# Patient Record
Sex: FEMALE | Race: WHITE | Employment: UNEMPLOYED | URBAN - METROPOLITAN AREA
[De-identification: names, ages, dates, MRNs, and addresses within clinical notes are randomized per-mention and may not be internally consistent; named-entity substitution may affect disease eponyms.]

---

## 2022-03-28 ENCOUNTER — HOSPITAL ENCOUNTER (INPATIENT)
Age: 57
LOS: 7 days | Discharge: NURSING, REHAB OR LONG-TERM CARE | DRG: 383 | End: 2022-04-04
Attending: EMERGENCY MEDICINE | Admitting: INTERNAL MEDICINE
Payer: MEDICARE

## 2022-03-28 ENCOUNTER — APPOINTMENT (OUTPATIENT)
Dept: CT IMAGING | Age: 57
DRG: 383 | End: 2022-03-28
Payer: MEDICARE

## 2022-03-28 ENCOUNTER — APPOINTMENT (OUTPATIENT)
Dept: GENERAL RADIOLOGY | Age: 57
DRG: 383 | End: 2022-03-28
Payer: MEDICARE

## 2022-03-28 DIAGNOSIS — N39.0 URINARY TRACT INFECTION WITHOUT HEMATURIA, SITE UNSPECIFIED: ICD-10-CM

## 2022-03-28 DIAGNOSIS — R19.7 NAUSEA VOMITING AND DIARRHEA: ICD-10-CM

## 2022-03-28 DIAGNOSIS — R10.9 ABDOMINAL PAIN, UNSPECIFIED ABDOMINAL LOCATION: Primary | ICD-10-CM

## 2022-03-28 DIAGNOSIS — R10.9 LEFT FLANK PAIN: ICD-10-CM

## 2022-03-28 DIAGNOSIS — R11.2 NAUSEA VOMITING AND DIARRHEA: ICD-10-CM

## 2022-03-28 LAB
A/G RATIO: 0.7 (ref 1.1–2.2)
ALBUMIN SERPL-MCNC: 3.2 G/DL (ref 3.4–5)
ALP BLD-CCNC: 170 U/L (ref 40–129)
ALT SERPL-CCNC: 16 U/L (ref 10–40)
ANION GAP SERPL CALCULATED.3IONS-SCNC: 10 MMOL/L (ref 3–16)
AST SERPL-CCNC: 35 U/L (ref 15–37)
BACTERIA: ABNORMAL /HPF
BASE EXCESS ARTERIAL: -2.1 MMOL/L (ref -3–3)
BASOPHILS ABSOLUTE: 0 K/UL (ref 0–0.2)
BASOPHILS RELATIVE PERCENT: 0.2 %
BILIRUB SERPL-MCNC: 2.3 MG/DL (ref 0–1)
BILIRUBIN URINE: NEGATIVE
BLOOD, URINE: ABNORMAL
BUN BLDV-MCNC: 9 MG/DL (ref 7–20)
CALCIUM SERPL-MCNC: 9.5 MG/DL (ref 8.3–10.6)
CARBOXYHEMOGLOBIN ARTERIAL: 1.8 % (ref 0–1.5)
CHLORIDE BLD-SCNC: 104 MMOL/L (ref 99–110)
CLARITY: ABNORMAL
CO2: 21 MMOL/L (ref 21–32)
COLOR: ABNORMAL
CREAT SERPL-MCNC: <0.5 MG/DL (ref 0.6–1.1)
EKG ATRIAL RATE: 71 BPM
EKG DIAGNOSIS: NORMAL
EKG P AXIS: 32 DEGREES
EKG P-R INTERVAL: 146 MS
EKG Q-T INTERVAL: 452 MS
EKG QRS DURATION: 88 MS
EKG QTC CALCULATION (BAZETT): 491 MS
EKG R AXIS: -26 DEGREES
EKG T AXIS: 0 DEGREES
EKG VENTRICULAR RATE: 71 BPM
EOSINOPHILS ABSOLUTE: 0.1 K/UL (ref 0–0.6)
EOSINOPHILS RELATIVE PERCENT: 1.3 %
EPITHELIAL CELLS, UA: ABNORMAL /HPF (ref 0–5)
GFR AFRICAN AMERICAN: >60
GFR NON-AFRICAN AMERICAN: >60
GLUCOSE BLD-MCNC: 116 MG/DL (ref 70–99)
GLUCOSE URINE: NEGATIVE MG/DL
HCO3 ARTERIAL: 21 MMOL/L (ref 21–29)
HCT VFR BLD CALC: 37.5 % (ref 36–48)
HEMOGLOBIN, ART, EXTENDED: 11.8 G/DL
HEMOGLOBIN: 12.5 G/DL (ref 12–16)
KETONES, URINE: NEGATIVE MG/DL
LEUKOCYTE ESTERASE, URINE: NEGATIVE
LIPASE: 26 U/L (ref 13–60)
LYMPHOCYTES ABSOLUTE: 0.8 K/UL (ref 1–5.1)
LYMPHOCYTES RELATIVE PERCENT: 14.1 %
MAGNESIUM: 1.7 MG/DL (ref 1.8–2.4)
MCH RBC QN AUTO: 29.6 PG (ref 26–34)
MCHC RBC AUTO-ENTMCNC: 33.3 G/DL (ref 31–36)
MCV RBC AUTO: 89 FL (ref 80–100)
METHEMOGLOBIN ARTERIAL: 0.1 % (ref 0–1.4)
MICROSCOPIC EXAMINATION: YES
MONOCYTES ABSOLUTE: 0.7 K/UL (ref 0–1.3)
MONOCYTES RELATIVE PERCENT: 11.6 %
NEUTROPHILS ABSOLUTE: 4.3 K/UL (ref 1.7–7.7)
NEUTROPHILS RELATIVE PERCENT: 72.8 %
NITRITE, URINE: POSITIVE
O2 SAT, ARTERIAL: 95 % (ref 93–100)
PCO2 ARTERIAL: 28.3 MMHG (ref 35–45)
PDW BLD-RTO: 15.2 % (ref 12.4–15.4)
PH ARTERIAL: 7.47 (ref 7.35–7.45)
PH UA: 8.5 (ref 5–8)
PLATELET # BLD: 108 K/UL (ref 135–450)
PMV BLD AUTO: 8.3 FL (ref 5–10.5)
PO2 ARTERIAL: 70.7 MMHG (ref 75–108)
POTASSIUM SERPL-SCNC: 4.4 MMOL/L (ref 3.5–5.1)
PROTEIN UA: ABNORMAL MG/DL
RBC # BLD: 4.22 M/UL (ref 4–5.2)
RBC UA: ABNORMAL /HPF (ref 0–4)
SODIUM BLD-SCNC: 135 MMOL/L (ref 136–145)
SPECIFIC GRAVITY UA: 1.01 (ref 1–1.03)
TCO2 ARTERIAL: 22 MMOL/L
TOTAL CK: 35 U/L (ref 26–192)
TOTAL PROTEIN: 7.5 G/DL (ref 6.4–8.2)
TROPONIN: <0.01 NG/ML
URINE REFLEX TO CULTURE: ABNORMAL
URINE TYPE: ABNORMAL
UROBILINOGEN, URINE: 2 E.U./DL
WBC # BLD: 5.9 K/UL (ref 4–11)
WBC UA: ABNORMAL /HPF (ref 0–5)

## 2022-03-28 PROCEDURE — 83735 ASSAY OF MAGNESIUM: CPT

## 2022-03-28 PROCEDURE — 85025 COMPLETE CBC W/AUTO DIFF WBC: CPT

## 2022-03-28 PROCEDURE — 6360000002 HC RX W HCPCS: Performed by: EMERGENCY MEDICINE

## 2022-03-28 PROCEDURE — 80053 COMPREHEN METABOLIC PANEL: CPT

## 2022-03-28 PROCEDURE — 96361 HYDRATE IV INFUSION ADD-ON: CPT

## 2022-03-28 PROCEDURE — 93010 ELECTROCARDIOGRAM REPORT: CPT | Performed by: INTERNAL MEDICINE

## 2022-03-28 PROCEDURE — 6360000002 HC RX W HCPCS: Performed by: INTERNAL MEDICINE

## 2022-03-28 PROCEDURE — 36415 COLL VENOUS BLD VENIPUNCTURE: CPT

## 2022-03-28 PROCEDURE — 87186 SC STD MICRODIL/AGAR DIL: CPT

## 2022-03-28 PROCEDURE — 87040 BLOOD CULTURE FOR BACTERIA: CPT

## 2022-03-28 PROCEDURE — 87077 CULTURE AEROBIC IDENTIFY: CPT

## 2022-03-28 PROCEDURE — 36600 WITHDRAWAL OF ARTERIAL BLOOD: CPT

## 2022-03-28 PROCEDURE — 2500000003 HC RX 250 WO HCPCS: Performed by: EMERGENCY MEDICINE

## 2022-03-28 PROCEDURE — 2580000003 HC RX 258: Performed by: EMERGENCY MEDICINE

## 2022-03-28 PROCEDURE — 96376 TX/PRO/DX INJ SAME DRUG ADON: CPT

## 2022-03-28 PROCEDURE — 93005 ELECTROCARDIOGRAM TRACING: CPT | Performed by: EMERGENCY MEDICINE

## 2022-03-28 PROCEDURE — 96375 TX/PRO/DX INJ NEW DRUG ADDON: CPT

## 2022-03-28 PROCEDURE — 87086 URINE CULTURE/COLONY COUNT: CPT

## 2022-03-28 PROCEDURE — C9113 INJ PANTOPRAZOLE SODIUM, VIA: HCPCS | Performed by: EMERGENCY MEDICINE

## 2022-03-28 PROCEDURE — 84484 ASSAY OF TROPONIN QUANT: CPT

## 2022-03-28 PROCEDURE — 99283 EMERGENCY DEPT VISIT LOW MDM: CPT

## 2022-03-28 PROCEDURE — A4216 STERILE WATER/SALINE, 10 ML: HCPCS | Performed by: EMERGENCY MEDICINE

## 2022-03-28 PROCEDURE — 74177 CT ABD & PELVIS W/CONTRAST: CPT

## 2022-03-28 PROCEDURE — 82803 BLOOD GASES ANY COMBINATION: CPT

## 2022-03-28 PROCEDURE — 2580000003 HC RX 258: Performed by: INTERNAL MEDICINE

## 2022-03-28 PROCEDURE — 1200000000 HC SEMI PRIVATE

## 2022-03-28 PROCEDURE — 81001 URINALYSIS AUTO W/SCOPE: CPT

## 2022-03-28 PROCEDURE — 6360000004 HC RX CONTRAST MEDICATION: Performed by: EMERGENCY MEDICINE

## 2022-03-28 PROCEDURE — 71045 X-RAY EXAM CHEST 1 VIEW: CPT

## 2022-03-28 PROCEDURE — 82550 ASSAY OF CK (CPK): CPT

## 2022-03-28 PROCEDURE — 96365 THER/PROPH/DIAG IV INF INIT: CPT

## 2022-03-28 PROCEDURE — 6370000000 HC RX 637 (ALT 250 FOR IP): Performed by: INTERNAL MEDICINE

## 2022-03-28 PROCEDURE — 83690 ASSAY OF LIPASE: CPT

## 2022-03-28 RX ORDER — SODIUM CHLORIDE 9 MG/ML
INJECTION, SOLUTION INTRAVENOUS PRN
Status: DISCONTINUED | OUTPATIENT
Start: 2022-03-28 | End: 2022-04-04 | Stop reason: HOSPADM

## 2022-03-28 RX ORDER — SODIUM CHLORIDE 0.9 % (FLUSH) 0.9 %
10 SYRINGE (ML) INJECTION PRN
Status: DISCONTINUED | OUTPATIENT
Start: 2022-03-28 | End: 2022-04-04 | Stop reason: HOSPADM

## 2022-03-28 RX ORDER — PROCHLORPERAZINE EDISYLATE 5 MG/ML
5 INJECTION INTRAMUSCULAR; INTRAVENOUS ONCE
Status: COMPLETED | OUTPATIENT
Start: 2022-03-28 | End: 2022-03-28

## 2022-03-28 RX ORDER — 0.9 % SODIUM CHLORIDE 0.9 %
1000 INTRAVENOUS SOLUTION INTRAVENOUS ONCE
Status: COMPLETED | OUTPATIENT
Start: 2022-03-28 | End: 2022-03-28

## 2022-03-28 RX ORDER — MORPHINE SULFATE 2 MG/ML
2 INJECTION, SOLUTION INTRAMUSCULAR; INTRAVENOUS
Status: DISCONTINUED | OUTPATIENT
Start: 2022-03-28 | End: 2022-03-29

## 2022-03-28 RX ORDER — PANTOPRAZOLE SODIUM 40 MG/10ML
40 INJECTION, POWDER, LYOPHILIZED, FOR SOLUTION INTRAVENOUS ONCE
Status: COMPLETED | OUTPATIENT
Start: 2022-03-28 | End: 2022-03-28

## 2022-03-28 RX ORDER — SODIUM CHLORIDE, SODIUM LACTATE, POTASSIUM CHLORIDE, AND CALCIUM CHLORIDE .6; .31; .03; .02 G/100ML; G/100ML; G/100ML; G/100ML
1000 INJECTION, SOLUTION INTRAVENOUS ONCE
Status: COMPLETED | OUTPATIENT
Start: 2022-03-28 | End: 2022-03-28

## 2022-03-28 RX ORDER — SODIUM CHLORIDE 0.9 % (FLUSH) 0.9 %
5-40 SYRINGE (ML) INJECTION EVERY 12 HOURS SCHEDULED
Status: DISCONTINUED | OUTPATIENT
Start: 2022-03-28 | End: 2022-04-04 | Stop reason: HOSPADM

## 2022-03-28 RX ORDER — SODIUM CHLORIDE 0.9 % (FLUSH) 0.9 %
5-40 SYRINGE (ML) INJECTION PRN
Status: DISCONTINUED | OUTPATIENT
Start: 2022-03-28 | End: 2022-04-04 | Stop reason: HOSPADM

## 2022-03-28 RX ORDER — POLYETHYLENE GLYCOL 3350 17 G/17G
17 POWDER, FOR SOLUTION ORAL DAILY PRN
Status: DISCONTINUED | OUTPATIENT
Start: 2022-03-28 | End: 2022-04-04 | Stop reason: HOSPADM

## 2022-03-28 RX ORDER — IPRATROPIUM BROMIDE AND ALBUTEROL SULFATE 2.5; .5 MG/3ML; MG/3ML
1 SOLUTION RESPIRATORY (INHALATION)
Status: DISCONTINUED | OUTPATIENT
Start: 2022-03-29 | End: 2022-03-29

## 2022-03-28 RX ORDER — PROMETHAZINE HYDROCHLORIDE 12.5 MG/1
12.5 TABLET ORAL EVERY 6 HOURS PRN
Status: DISCONTINUED | OUTPATIENT
Start: 2022-03-28 | End: 2022-04-04 | Stop reason: HOSPADM

## 2022-03-28 RX ORDER — SODIUM CHLORIDE 0.9 % (FLUSH) 0.9 %
10 SYRINGE (ML) INJECTION EVERY 12 HOURS SCHEDULED
Status: DISCONTINUED | OUTPATIENT
Start: 2022-03-28 | End: 2022-04-04 | Stop reason: HOSPADM

## 2022-03-28 RX ORDER — MAGNESIUM SULFATE IN WATER 40 MG/ML
2000 INJECTION, SOLUTION INTRAVENOUS ONCE
Status: COMPLETED | OUTPATIENT
Start: 2022-03-28 | End: 2022-03-29

## 2022-03-28 RX ORDER — ACETAMINOPHEN 650 MG/1
650 SUPPOSITORY RECTAL EVERY 6 HOURS PRN
Status: DISCONTINUED | OUTPATIENT
Start: 2022-03-28 | End: 2022-04-04 | Stop reason: HOSPADM

## 2022-03-28 RX ORDER — SENNA AND DOCUSATE SODIUM 50; 8.6 MG/1; MG/1
1 TABLET, FILM COATED ORAL 2 TIMES DAILY
Status: DISCONTINUED | OUTPATIENT
Start: 2022-03-28 | End: 2022-04-04 | Stop reason: HOSPADM

## 2022-03-28 RX ORDER — ONDANSETRON 2 MG/ML
4 INJECTION INTRAMUSCULAR; INTRAVENOUS ONCE
Status: DISCONTINUED | OUTPATIENT
Start: 2022-03-28 | End: 2022-03-28

## 2022-03-28 RX ORDER — MORPHINE SULFATE 4 MG/ML
4 INJECTION, SOLUTION INTRAMUSCULAR; INTRAVENOUS
Status: DISCONTINUED | OUTPATIENT
Start: 2022-03-28 | End: 2022-03-29

## 2022-03-28 RX ORDER — MORPHINE SULFATE 4 MG/ML
4 INJECTION, SOLUTION INTRAMUSCULAR; INTRAVENOUS ONCE
Status: COMPLETED | OUTPATIENT
Start: 2022-03-28 | End: 2022-03-28

## 2022-03-28 RX ORDER — ONDANSETRON 2 MG/ML
4 INJECTION INTRAMUSCULAR; INTRAVENOUS EVERY 6 HOURS PRN
Status: DISCONTINUED | OUTPATIENT
Start: 2022-03-28 | End: 2022-04-04 | Stop reason: HOSPADM

## 2022-03-28 RX ORDER — ACETAMINOPHEN 325 MG/1
650 TABLET ORAL EVERY 6 HOURS PRN
Status: DISCONTINUED | OUTPATIENT
Start: 2022-03-28 | End: 2022-04-04 | Stop reason: HOSPADM

## 2022-03-28 RX ADMIN — CEFTRIAXONE 1000 MG: 1 INJECTION, POWDER, FOR SOLUTION INTRAMUSCULAR; INTRAVENOUS at 15:56

## 2022-03-28 RX ADMIN — SODIUM CHLORIDE, POTASSIUM CHLORIDE, SODIUM LACTATE AND CALCIUM CHLORIDE 1000 ML: 600; 310; 30; 20 INJECTION, SOLUTION INTRAVENOUS at 21:20

## 2022-03-28 RX ADMIN — MORPHINE SULFATE 4 MG: 4 INJECTION, SOLUTION INTRAMUSCULAR; INTRAVENOUS at 10:16

## 2022-03-28 RX ADMIN — PROMETHAZINE HYDROCHLORIDE 12.5 MG: 12.5 TABLET ORAL at 22:27

## 2022-03-28 RX ADMIN — ACETAMINOPHEN 650 MG: 325 TABLET ORAL at 20:16

## 2022-03-28 RX ADMIN — IOPAMIDOL 80 ML: 755 INJECTION, SOLUTION INTRAVENOUS at 11:44

## 2022-03-28 RX ADMIN — SODIUM CHLORIDE, PRESERVATIVE FREE 10 ML: 5 INJECTION INTRAVENOUS at 20:17

## 2022-03-28 RX ADMIN — MORPHINE SULFATE 4 MG: 4 INJECTION, SOLUTION INTRAMUSCULAR; INTRAVENOUS at 15:31

## 2022-03-28 RX ADMIN — SODIUM CHLORIDE, PRESERVATIVE FREE 10 ML: 5 INJECTION INTRAVENOUS at 22:28

## 2022-03-28 RX ADMIN — SODIUM CHLORIDE 1000 ML: 9 INJECTION, SOLUTION INTRAVENOUS at 10:16

## 2022-03-28 RX ADMIN — VANCOMYCIN HYDROCHLORIDE 1250 MG: 10 INJECTION, POWDER, LYOPHILIZED, FOR SOLUTION INTRAVENOUS at 23:35

## 2022-03-28 RX ADMIN — MORPHINE SULFATE 4 MG: 4 INJECTION INTRAVENOUS at 21:18

## 2022-03-28 RX ADMIN — CEFEPIME HYDROCHLORIDE 2000 MG: 2 INJECTION, POWDER, FOR SOLUTION INTRAVENOUS at 21:22

## 2022-03-28 RX ADMIN — PANTOPRAZOLE SODIUM 40 MG: 40 INJECTION, POWDER, FOR SOLUTION INTRAVENOUS at 17:14

## 2022-03-28 RX ADMIN — FAMOTIDINE 20 MG: 10 INJECTION, SOLUTION INTRAVENOUS at 10:15

## 2022-03-28 RX ADMIN — MAGNESIUM SULFATE HEPTAHYDRATE 2000 MG: 2 INJECTION, SOLUTION INTRAVENOUS at 22:12

## 2022-03-28 RX ADMIN — ENOXAPARIN SODIUM 40 MG: 100 INJECTION SUBCUTANEOUS at 20:17

## 2022-03-28 RX ADMIN — PROCHLORPERAZINE EDISYLATE 5 MG: 5 INJECTION INTRAMUSCULAR; INTRAVENOUS at 10:15

## 2022-03-28 ASSESSMENT — PAIN SCALES - GENERAL
PAINLEVEL_OUTOF10: 9

## 2022-03-28 ASSESSMENT — PAIN - FUNCTIONAL ASSESSMENT: PAIN_FUNCTIONAL_ASSESSMENT: 0-10

## 2022-03-28 ASSESSMENT — PAIN DESCRIPTION - LOCATION: LOCATION: ABDOMEN

## 2022-03-28 ASSESSMENT — PAIN DESCRIPTION - PAIN TYPE: TYPE: ACUTE PAIN

## 2022-03-28 ASSESSMENT — PAIN DESCRIPTION - ORIENTATION: ORIENTATION: LEFT

## 2022-03-28 NOTE — ED PROVIDER NOTES
[facility]  EMERGENCY DEPT VISIT      Patient Identification  NITA Etienne is a 59 y.o. female. Chief Complaint   Abdominal Pain, Emesis, and Diarrhea      History of Present Illness: This is a  59 y.o. female who presents via ambulance to the ED with complaints of a 1 week history of abdominal pain, left-sided back pain, nausea, vomiting, and diarrhea. No definite fever. No known ill contacts. She tried take Imodium without relief. She has been trying to drink water. She denies dysuria, frequency, urgency, hematuria. She states that her chest is bothering her also but is not able to specify what type of discomfort it is. No shortness of breath. She is chronically trached. Past Medical History:   Diagnosis Date    Hyperlipidemia     Tracheostomy in place [facility])        History reviewed. No pertinent surgical history. No current facility-administered medications for this encounter. No current outpatient medications on file. No Known Allergies    Social History     Socioeconomic History    Marital status: Single     Spouse name: Not on file    Number of children: Not on file    Years of education: Not on file    Highest education level: Not on file   Occupational History    Not on file   Tobacco Use    Smoking status: Former Smoker    Smokeless tobacco: Never Used   Substance and Sexual Activity    Alcohol use: Not Currently    Drug use: Never    Sexual activity: Not on file   Other Topics Concern    Not on file   Social History Narrative    Not on file     Social Determinants of Health     Financial Resource Strain:     Difficulty of Paying Living Expenses: Not on file   Food Insecurity:     Worried About [address] in the Last Year: Not on file    Sunil of Food in the Last Year: Not on file   Transportation Needs:     Lack of Transportation (Medical): Not on file    Lack of Transportation (Non-Medical):  Not on file   Physical Activity:     Days of Exercise per Week: Not on file    Minutes of Exercise per Session: Not on file   Stress:     Feeling of Stress : Not on file   Social Connections:     Frequency of Communication with Friends and Family: Not on file    Frequency of Social Gatherings with Friends and Family: Not on file    Attends Mandaeism Services: Not on file    Active Member of [address] [place] [address] or Organizations: Not on file    Attends Club or Organization Meetings: Not on file    Marital Status: Not on file   Intimate Partner Violence:     Fear of Current or Ex-Partner: Not on file    Emotionally Abused: Not on file    Physically Abused: Not on file    Sexually Abused: Not on file   Housing Stability:     Unable to Pay for Housing in the Last Year: Not on file    Number of [place] in the Last Year: Not on file    Unstable Housing in the Last Year: Not on file       Nursing Notes Reviewed      ROS:  GENERAL:  + fever, no chills, no diaphoresis, no appetite changes  EYES: no eye discharge, no eye redness, no visual changes  ENT: no nasal congestion, no sore throat  CARDIAC: no chest pain,  no leg swelling  PULM: no cough, no shortness of breath  ABD: + abdominal pain, + nausea, + vomiting, + diarrhea, no melena or hematochezia  : no dysuria, no hematuria, no urgency, no frequency. + flank pain  MUSCULOSKELETAL: + back pain, no arthralgias, no myalgias  NEURO: no headache, no lightheadedness, no dizziness, no numbness, no weakness, no syncope  SKIN: no rashes, no erythema, no wounds, no ecchymosis      PHYSICAL EXAM:  GENERAL APPEARANCE: NITA Etienne is in no acute respiratory distress. Awake and alert. VITAL SIGNS:   ED Triage Vitals [03/28/22 0939]   Enc Vitals Group      BP (!) 160/79      Pulse 60      Resp 18      Temp 97.9 °F (36.6 °C)      Temp Source Oral      SpO2 100 %      Weight 200 lb (90.7 kg)      Height       Head Circumference       Peak Flow       Pain Score       Pain Loc       Pain Edu? Excl. in [address]?       HEAD: Normocephalic, atraumatic. EYES:  Extraocular muscles are intact. Pupils equal round and reactive to light. Conjunctivas are pink. Negative scleral icterus. ENT:  Mucous membranes are dry  Pharynx without erythema or exudates. NECK: Nontender and supple. No cervical adenopathy. Trach in place  CHEST:  Clear to auscultation bilaterally. No rales, rhonchi, or wheezing. HEART:  Regular rate and regular rhythm. No murmurs. Strong and equal pulses in the upper and lower extremities. ABDOMEN: Soft,  nondistended, positive bowel sounds. abdomen is diffusely tender. No rebound. no guarding. MUSCULOSKELETAL: The calves are nontender to palpation. Active range of motion of the upper and lower extremities. No edema. NEUROLOGICAL: Awake, alert and oriented x 3. Power intact in the upper and lower extremities. DERMATOLOGIC: No petechiae, rashes, or ecchymoses. No erythema. PSYCH: normal mood and affect. Normal thought content. ED COURSE AND MEDICAL DECISION MAKING:  The Ekg interpreted by me in the absence of a cardiologist shows. normal sinus rhythm with a rate of 71  Axis is   Normal  QTc is  491ms   LVH  Intervals and Durations are unremarkable. Non specific ST-T wave changes appreciated. No evidence of acute ischemia. No prior EKG        Radiology:  Films have been read by radiologist as noted in chart unless otherwise stated. Other radiologic studies (i.e. CT, MRI, ultrasounds, etc ) have been interpreted by radiologist.     CT ABDOMEN PELVIS W IV CONTRAST Additional Contrast? None   Final Result   1. Cirrhotic appearing liver with stigmata of portal hypertension consisting of splenomegaly, small amount of ascites and left upper quadrant varices. 2. Distended gallbladder with cholelithiasis. This can be seen with prolonged fasting state or chronic cystic duct obstruction. 3. There is some mild wall thickening of the transverse limb of the duodenum with some adjacent retroperitoneal fat stranding.  This may reflect a duodenitis. Alternatively, this appearance may be caused by the duodenum being decompressed. This could be    further evaluated with endoscopy. 4. There is some retroperitoneal fat stranding identified within the right upper quadrant and right mid abdomen. This may be related to cirrhosis and portal hypertension. Reactive edema could result in this appearance in the setting of a duodenitis.           Labs:  Results for orders placed or performed during the hospital encounter of 03/28/22   CBC with Auto Differential   Result Value Ref Range    WBC 5.9 4.0 - 11.0 K/uL    RBC 4.22 4.00 - 5.20 M/uL    Hemoglobin 12.5 12.0 - 16.0 g/dL    Hematocrit 37.5 36.0 - 48.0 %    MCV 89.0 80.0 - 100.0 fL    MCH 29.6 26.0 - 34.0 pg    MCHC 33.3 31.0 - 36.0 g/dL    RDW 15.2 12.4 - 15.4 %    Platelets 639 (L) 641 - 450 K/uL    MPV 8.3 5.0 - 10.5 fL    Neutrophils % 72.8 %    Lymphocytes % 14.1 %    Monocytes % 11.6 %    Eosinophils % 1.3 %    Basophils % 0.2 %    Neutrophils Absolute 4.3 1.7 - 7.7 K/uL    Lymphocytes Absolute 0.8 (L) 1.0 - 5.1 K/uL    Monocytes Absolute 0.7 0.0 - 1.3 K/uL    Eosinophils Absolute 0.1 0.0 - 0.6 K/uL    Basophils Absolute 0.0 0.0 - 0.2 K/uL   Comprehensive Metabolic Panel   Result Value Ref Range    Sodium 135 (L) 136 - 145 mmol/L    Potassium 4.4 3.5 - 5.1 mmol/L    Chloride 104 99 - 110 mmol/L    CO2 21 21 - 32 mmol/L    Anion Gap 10 3 - 16    Glucose 116 (H) 70 - 99 mg/dL    BUN 9 7 - 20 mg/dL    CREATININE <0.5 (L) 0.6 - 1.1 mg/dL    GFR Non-African American >60 >60    GFR African American >60 >60    Calcium 9.5 8.3 - 10.6 mg/dL    Total Protein 7.5 6.4 - 8.2 g/dL    Albumin 3.2 (L) 3.4 - 5.0 g/dL    Albumin/Globulin Ratio 0.7 (L) 1.1 - 2.2    Total Bilirubin 2.3 (H) 0.0 - 1.0 mg/dL    Alkaline Phosphatase 170 (H) 40 - 129 U/L    ALT 16 10 - 40 U/L    AST 35 15 - 37 U/L   Lipase   Result Value Ref Range    Lipase 26.0 13.0 - 60.0 U/L   Troponin   Result Value Ref Range    Troponin <0.01 <0.01 ng/mL   Urinalysis with Reflex to Culture    Specimen: Urine   Result Value Ref Range    Color, UA DARK YELLOW (A) Straw/Yellow    Clarity, UA SL CLOUDY (A) Clear    Glucose, Ur Negative Negative mg/dL    Bilirubin Urine Negative Negative    Ketones, Urine Negative Negative mg/dL    Specific Gravity, UA 1.015 1.005 - 1.030    Blood, Urine TRACE (A) Negative    pH, UA 8.5 (A) 5.0 - 8.0    Protein, UA TRACE (A) Negative mg/dL    Urobilinogen, Urine 2.0 (A) <2.0 E.U./dL    Nitrite, Urine POSITIVE (A) Negative    Leukocyte Esterase, Urine Negative Negative    Microscopic Examination YES     Urine Type NotGiven     Urine Reflex to Culture Not Indicated    Magnesium   Result Value Ref Range    Magnesium 1.70 (L) 1.80 - 2.40 mg/dL   Microscopic Urinalysis   Result Value Ref Range    WBC, UA 6-9 (A) 0 - 5 /HPF    RBC, UA 5-10 (A) 0 - 4 /HPF    Epithelial Cells, UA 2-5 0 - 5 /HPF    Bacteria, UA 2+ (A) None Seen /HPF   EKG 12 Lead   Result Value Ref Range    Ventricular Rate 71 BPM    Atrial Rate 71 BPM    P-R Interval 146 ms    QRS Duration 88 ms    Q-T Interval 452 ms    QTc Calculation (Bazett) 491 ms    P Axis 32 degrees    R Axis -26 degrees    T Axis 0 degrees    Diagnosis       Normal sinus rhythmModerate voltage criteria for LVH, may be normal variantNonspecific ST abnormalityProlonged QTAbnormal ECGConfirmed by Joanna Mckoy ([id number]) on 3/28/2022 11:21:44 AM       Treatment in the department:  Patient received the following while in the ED.     Medications   0.9 % sodium chloride bolus (0 mLs IntraVENous Stopped 3/28/22 1308)   famotidine (PEPCID) 20 mg in sodium chloride (PF) 10 mL injection (20 mg IntraVENous Given 3/28/22 1015)   morphine injection 4 mg (4 mg IntraVENous Given 3/28/22 1016)   prochlorperazine (COMPAZINE) injection 5 mg (5 mg IntraVENous Given 3/28/22 1015)   iopamidol (ISOVUE-370) 76 % injection 80 mL (80 mLs IntraVENous Given 3/28/22 1144)   morphine injection 4 mg (4 mg IntraVENous Given 3/28/22 1531)   cefTRIAXone (ROCEPHIN) 1000 mg IVPB in 50 mL D5W minibag (0 mg IntraVENous Stopped 3/28/22 1649)   pantoprazole (PROTONIX) injection 40 mg (40 mg IntraVENous Given 3/28/22 1714)     Repeat exam shows pain escalating again. No vomiting. toleratin PO ice chips. Repeat exam shows diffuse abdominal tenderness. No localized tenderness to RUQ    Repeat exam shows patient still having pain, later mounting low grade temp 100 just prior to transfer    Medical decision making:  Patient with back pain, vomiting, diarrhea, and abdominal paiun. No fever at home to her knowledge. Mild diffuse abdominal tenderness, no guarding or localization, pain more in back left. No leukocytosis. No signs of severe dehydration on labs. No MALENA. Chronically elevated bilirubin. CT with dilated gallbladder and stones with localized ascites. inflamatory changes around duodenum but no definite perforation. Reportedly patient recently scoped but unable to find report. Could have cholecystitis. Or duodenitis. UA with positive nitrite but minimal pyuria and bacteruria. Treated empirically for UTI given fever. I spoke with Dr. Shakira Armstrong. We thoroughly discussed the history, physical exam, laboratory and imaging studies, as well as, emergency department course. Based upon that discussion, we've decided to admit [address] for further observation and evaluation of NITA ALLEN Rehfues's abdominal pain. As I have deemed necessary from their history, physical and studies, I have considered and evaluated NITA A Rehfues for the following diagnoses:  ACUTE APPENDICITIS, BOWEL OBSTRUCTION, CHOLECYSTITIS, DIVERTICULITIS, INCARCERATED HERNIA, PANCREATITIS, or PERFORATED BOWEL or ULCER, AAA, OBSTRUCTIVE UROPATHY, ISCHEMIC COLITIS. Clinical Impression:  1. Abdominal pain, unspecified abdominal location    2. Nausea vomiting and diarrhea    3. Left flank pain    4.  Urinary tract infection without hematuria, site unspecified Dispo:  Patient will be  admitted at this time. Patient was informed of this decision and agrees with plan. I have discussed lab and xray findings with patient and they understand. Questions were answered to the best of my ability. Discharge vitals:  Blood pressure 119/70, pulse 60, temperature 97.9 °F (36.6 °C), temperature source Oral, resp. rate 18, weight 200 lb (90.7 kg), SpO2 92 %. Prescriptions given:   New Prescriptions    No medications on file         This chart was created using Dragon voice recognition software.         Domingo Deal MD  03/28/22 1597

## 2022-03-28 NOTE — ED NOTES
Called report to Ange Ramirez at [facility].      Bj Elizalde RN  03/28/22 2011 Simple: Patient demonstrates quick and easy understanding

## 2022-03-28 NOTE — ED NOTES
Pt given new warm blanket. States she continues to have pain. No vomiting since arrival to ED. Pt requesting drink. Dr. Kalpesh Katz informed.      Deven Record, RN  03/28/22 7687

## 2022-03-29 LAB
ANION GAP SERPL CALCULATED.3IONS-SCNC: 11 MMOL/L (ref 3–16)
BASOPHILS ABSOLUTE: 0.1 K/UL (ref 0–0.2)
BASOPHILS RELATIVE PERCENT: 0.7 %
BUN BLDV-MCNC: 11 MG/DL (ref 7–20)
CALCIUM SERPL-MCNC: 8.4 MG/DL (ref 8.3–10.6)
CHLORIDE BLD-SCNC: 102 MMOL/L (ref 99–110)
CO2: 20 MMOL/L (ref 21–32)
CREAT SERPL-MCNC: 0.7 MG/DL (ref 0.6–1.1)
EOSINOPHILS ABSOLUTE: 0.1 K/UL (ref 0–0.6)
EOSINOPHILS RELATIVE PERCENT: 1 %
GFR AFRICAN AMERICAN: >60
GFR NON-AFRICAN AMERICAN: >60
GLUCOSE BLD-MCNC: 83 MG/DL (ref 70–99)
HCT VFR BLD CALC: 32 % (ref 36–48)
HEMOGLOBIN: 11 G/DL (ref 12–16)
LACTIC ACID: 1.2 MMOL/L (ref 0.4–2)
LYMPHOCYTES ABSOLUTE: 1 K/UL (ref 1–5.1)
LYMPHOCYTES RELATIVE PERCENT: 12.2 %
MCH RBC QN AUTO: 30.3 PG (ref 26–34)
MCHC RBC AUTO-ENTMCNC: 34.4 G/DL (ref 31–36)
MCV RBC AUTO: 88.1 FL (ref 80–100)
MONOCYTES ABSOLUTE: 0.8 K/UL (ref 0–1.3)
MONOCYTES RELATIVE PERCENT: 9.3 %
NEUTROPHILS ABSOLUTE: 6.5 K/UL (ref 1.7–7.7)
NEUTROPHILS RELATIVE PERCENT: 76.8 %
PDW BLD-RTO: 15.5 % (ref 12.4–15.4)
PLATELET # BLD: 90 K/UL (ref 135–450)
PLATELET SLIDE REVIEW: ABNORMAL
PMV BLD AUTO: 7.8 FL (ref 5–10.5)
POTASSIUM REFLEX MAGNESIUM: 3.8 MMOL/L (ref 3.5–5.1)
PROCALCITONIN: 0.28 NG/ML (ref 0–0.15)
RAPID INFLUENZA  B AGN: NEGATIVE
RAPID INFLUENZA A AGN: NEGATIVE
RBC # BLD: 3.63 M/UL (ref 4–5.2)
SARS-COV-2, NAAT: NOT DETECTED
SODIUM BLD-SCNC: 133 MMOL/L (ref 136–145)
VANCOMYCIN RANDOM: 11.8 UG/ML
WBC # BLD: 8.5 K/UL (ref 4–11)

## 2022-03-29 PROCEDURE — 6360000002 HC RX W HCPCS: Performed by: INTERNAL MEDICINE

## 2022-03-29 PROCEDURE — 97166 OT EVAL MOD COMPLEX 45 MIN: CPT

## 2022-03-29 PROCEDURE — 87804 INFLUENZA ASSAY W/OPTIC: CPT

## 2022-03-29 PROCEDURE — 80202 ASSAY OF VANCOMYCIN: CPT

## 2022-03-29 PROCEDURE — 82105 ALPHA-FETOPROTEIN SERUM: CPT

## 2022-03-29 PROCEDURE — 97162 PT EVAL MOD COMPLEX 30 MIN: CPT

## 2022-03-29 PROCEDURE — 87635 SARS-COV-2 COVID-19 AMP PRB: CPT

## 2022-03-29 PROCEDURE — 80048 BASIC METABOLIC PNL TOTAL CA: CPT

## 2022-03-29 PROCEDURE — 97530 THERAPEUTIC ACTIVITIES: CPT

## 2022-03-29 PROCEDURE — 84145 PROCALCITONIN (PCT): CPT

## 2022-03-29 PROCEDURE — 36415 COLL VENOUS BLD VENIPUNCTURE: CPT

## 2022-03-29 PROCEDURE — 85025 COMPLETE CBC W/AUTO DIFF WBC: CPT

## 2022-03-29 PROCEDURE — 97535 SELF CARE MNGMENT TRAINING: CPT

## 2022-03-29 PROCEDURE — 1200000000 HC SEMI PRIVATE

## 2022-03-29 PROCEDURE — 83605 ASSAY OF LACTIC ACID: CPT

## 2022-03-29 PROCEDURE — 97116 GAIT TRAINING THERAPY: CPT

## 2022-03-29 PROCEDURE — 94761 N-INVAS EAR/PLS OXIMETRY MLT: CPT

## 2022-03-29 PROCEDURE — 2580000003 HC RX 258: Performed by: INTERNAL MEDICINE

## 2022-03-29 PROCEDURE — 87641 MR-STAPH DNA AMP PROBE: CPT

## 2022-03-29 PROCEDURE — 6370000000 HC RX 637 (ALT 250 FOR IP): Performed by: INTERNAL MEDICINE

## 2022-03-29 RX ORDER — IPRATROPIUM BROMIDE AND ALBUTEROL SULFATE 2.5; .5 MG/3ML; MG/3ML
1 SOLUTION RESPIRATORY (INHALATION) EVERY 4 HOURS PRN
Status: DISCONTINUED | OUTPATIENT
Start: 2022-03-29 | End: 2022-04-04 | Stop reason: HOSPADM

## 2022-03-29 RX ORDER — SODIUM CHLORIDE, SODIUM LACTATE, POTASSIUM CHLORIDE, CALCIUM CHLORIDE 600; 310; 30; 20 MG/100ML; MG/100ML; MG/100ML; MG/100ML
INJECTION, SOLUTION INTRAVENOUS CONTINUOUS
Status: ACTIVE | OUTPATIENT
Start: 2022-03-29 | End: 2022-03-29

## 2022-03-29 RX ORDER — GUAIFENESIN/DEXTROMETHORPHAN 100-10MG/5
10 SYRUP ORAL EVERY 6 HOURS PRN
Status: DISCONTINUED | OUTPATIENT
Start: 2022-03-29 | End: 2022-04-04 | Stop reason: HOSPADM

## 2022-03-29 RX ADMIN — VANCOMYCIN HYDROCHLORIDE 1250 MG: 10 INJECTION, POWDER, LYOPHILIZED, FOR SOLUTION INTRAVENOUS at 22:35

## 2022-03-29 RX ADMIN — MORPHINE SULFATE 4 MG: 4 INJECTION INTRAVENOUS at 13:55

## 2022-03-29 RX ADMIN — CEFEPIME HYDROCHLORIDE 2000 MG: 2 INJECTION, POWDER, FOR SOLUTION INTRAVENOUS at 08:44

## 2022-03-29 RX ADMIN — PROMETHAZINE HYDROCHLORIDE 12.5 MG: 12.5 TABLET ORAL at 20:10

## 2022-03-29 RX ADMIN — ACETAMINOPHEN 650 MG: 325 TABLET ORAL at 20:09

## 2022-03-29 RX ADMIN — HYDROMORPHONE HYDROCHLORIDE 0.5 MG: 1 INJECTION, SOLUTION INTRAMUSCULAR; INTRAVENOUS; SUBCUTANEOUS at 19:46

## 2022-03-29 RX ADMIN — SODIUM CHLORIDE, PRESERVATIVE FREE 10 ML: 5 INJECTION INTRAVENOUS at 20:16

## 2022-03-29 RX ADMIN — VANCOMYCIN HYDROCHLORIDE 1250 MG: 10 INJECTION, POWDER, LYOPHILIZED, FOR SOLUTION INTRAVENOUS at 11:02

## 2022-03-29 RX ADMIN — ENOXAPARIN SODIUM 40 MG: 100 INJECTION SUBCUTANEOUS at 08:39

## 2022-03-29 RX ADMIN — MORPHINE SULFATE 4 MG: 4 INJECTION INTRAVENOUS at 04:02

## 2022-03-29 RX ADMIN — CEFEPIME HYDROCHLORIDE 2000 MG: 2 INJECTION, POWDER, FOR SOLUTION INTRAVENOUS at 20:11

## 2022-03-29 RX ADMIN — ACETAMINOPHEN 650 MG: 325 TABLET ORAL at 04:02

## 2022-03-29 RX ADMIN — SODIUM CHLORIDE, POTASSIUM CHLORIDE, SODIUM LACTATE AND CALCIUM CHLORIDE: 600; 310; 30; 20 INJECTION, SOLUTION INTRAVENOUS at 12:31

## 2022-03-29 RX ADMIN — MORPHINE SULFATE 4 MG: 4 INJECTION INTRAVENOUS at 00:11

## 2022-03-29 RX ADMIN — ACETAMINOPHEN 650 MG: 325 TABLET ORAL at 13:47

## 2022-03-29 RX ADMIN — SODIUM CHLORIDE, PRESERVATIVE FREE 10 ML: 5 INJECTION INTRAVENOUS at 08:39

## 2022-03-29 RX ADMIN — SODIUM CHLORIDE, PRESERVATIVE FREE 10 ML: 5 INJECTION INTRAVENOUS at 11:15

## 2022-03-29 RX ADMIN — MORPHINE SULFATE 4 MG: 4 INJECTION INTRAVENOUS at 09:30

## 2022-03-29 RX ADMIN — HYDROMORPHONE HYDROCHLORIDE 0.5 MG: 1 INJECTION, SOLUTION INTRAMUSCULAR; INTRAVENOUS; SUBCUTANEOUS at 14:51

## 2022-03-29 ASSESSMENT — PAIN SCALES - GENERAL
PAINLEVEL_OUTOF10: 10
PAINLEVEL_OUTOF10: 8
PAINLEVEL_OUTOF10: 9
PAINLEVEL_OUTOF10: 7
PAINLEVEL_OUTOF10: 9
PAINLEVEL_OUTOF10: 7
PAINLEVEL_OUTOF10: 10
PAINLEVEL_OUTOF10: 8
PAINLEVEL_OUTOF10: 8
PAINLEVEL_OUTOF10: 9
PAINLEVEL_OUTOF10: 10
PAINLEVEL_OUTOF10: 10
PAINLEVEL_OUTOF10: 7

## 2022-03-29 ASSESSMENT — PAIN DESCRIPTION - DESCRIPTORS
DESCRIPTORS: ACHING;DISCOMFORT;CONSTANT
DESCRIPTORS: ACHING;CONSTANT;DISCOMFORT

## 2022-03-29 ASSESSMENT — PAIN DESCRIPTION - LOCATION
LOCATION: ABDOMEN;BACK
LOCATION: ABDOMEN;BACK
LOCATION: ABDOMEN;BACK;HEAD

## 2022-03-29 ASSESSMENT — PAIN DESCRIPTION - PROGRESSION
CLINICAL_PROGRESSION: RAPIDLY WORSENING
CLINICAL_PROGRESSION: RAPIDLY WORSENING

## 2022-03-29 ASSESSMENT — PAIN DESCRIPTION - PAIN TYPE
TYPE: ACUTE PAIN

## 2022-03-29 ASSESSMENT — PAIN DESCRIPTION - FREQUENCY
FREQUENCY: CONTINUOUS
FREQUENCY: CONTINUOUS

## 2022-03-29 ASSESSMENT — PAIN DESCRIPTION - ONSET
ONSET: ON-GOING
ONSET: ON-GOING

## 2022-03-29 NOTE — PROGRESS NOTES
Pt having extreme pain and little relief with Morphine, MD switched Pt over to IV dilaudid, first dose given and will re-evaluate. 68

## 2022-03-29 NOTE — RT PROTOCOL NOTE
RT Nebulizer Bronchodilator Protocol Note    There is a bronchodilator order in the chart from a provider indicating to follow the RT Bronchodilator Protocol and there is an Initiate RT Bronchodilator Protocol order as well (see protocol at bottom of note). CXR Findings:  XR CHEST PORTABLE    Result Date: 3/28/2022  Diffuse bilateral airspace disease, greater on the left. The findings from the last RT Protocol Assessment were as follows:  Smoking: Smoker 15 pack years or more  Respiratory Pattern: Regular pattern and RR 12-20 bpm  Breath Sounds: Clear breath sounds  Cough: Strong, spontaneous, non-productive  Indication for Bronchodilator Therapy:    Bronchodilator Assessment Score: 1    Aerosolized bronchodilator medication orders have been revised according to the RT Nebulizer Bronchodilator Protocol below. Respiratory Therapist to perform RT Therapy Protocol Assessment initially then follow the protocol. Repeat RT Therapy Protocol Assessment PRN for score 0-3 or on second treatment, BID, and PRN for scores above 3. No Indications - adjust the frequency to every 6 hours PRN wheezing or bronchospasm, if no treatments needed after 48 hours then discontinue using Per Protocol order mode. If indication present, adjust the RT bronchodilator orders based on the Bronchodilator Assessment Score as indicated below. If a patient is on this medication at home then do not decrease Frequency below that used at home. 0-3 - enter or revise RT bronchodilator order(s) to equivalent RT Bronchodilator order with Frequency of every 4 hours PRN for wheezing or increased work of breathing using Per Protocol order mode. 4-6 - enter or revise RT Bronchodilator order(s) to two equivalent RT bronchodilator orders with one order with BID Frequency and one order with Frequency of every 4 hours PRN wheezing or increased work of breathing using Per Protocol order mode.          7-10 - enter or revise RT Bronchodilator order(s) to two equivalent RT bronchodilator orders with one order with TID Frequency and one order with Frequency of every 4 hours PRN wheezing or increased work of breathing using Per Protocol order mode. 11-13 - enter or revise RT Bronchodilator order(s) to one equivalent RT bronchodilator order with QID Frequency and an Albuterol order with Frequency of every 4 hours PRN wheezing or increased work of breathing using Per Protocol order mode. Greater than 13 - enter or revise RT Bronchodilator order(s) to one equivalent RT bronchodilator order with every 4 hours Frequency and an Albuterol order with Frequency of every 2 hours PRN wheezing or increased work of breathing using Per Protocol order mode. RT to enter RT Home Evaluation for COPD & MDI Assessment order using Per Protocol order mode.     Electronically signed by Joycie Schlatter, RCP on 3/29/2022 at 7:56 AM

## 2022-03-29 NOTE — PROGRESS NOTES
Clinical Pharmacy Progress Note    Vancomycin - Management by Pharmacy    Consult Date(s): 3/28  Consulting Provider(s): Dr. Nuha Guzman / Plan:  1)  Sepsis of unclear origin - Vancomycin   Concurrent Antimicrobials: Cefepime   Day of Vanc Therapy: 2   Current Dosing Method: Bayesian-Guided AUC Dosing  o Therapeutic Goal: AUC = 400-600 mg/L*hr  o Continue 1250mg IV q12h. Random level drawn 5h after first dose this AM = 11.8. Estimated AUC = 551 and trough = 16.7.   o Random level is ordered for 3/30 AM to further evaluate above regimen.  Will continue to monitor clinical condition and make adjustments to regimen as appropriate. Please call with questions--  Thanks--  Katelin Hammer, PharmD, BCPS, BCGP  [id number] ([facility] [place])   3/29/2022 9:48 AM      Interval update: All cultures pending. Tmax 101.1 overnight. Subjective/Objective:  NITA Eteinne is a 59 y.o. female with a PMHx significant for HLD, Alcoholic cirrhosis, [id number] PNA requiring trach placement (fall 2021) who is admitted with abdominal pain and sepsis of unclear origin. Pharmacy is consulted to dose vancomycin. Ht Readings from Last 1 Encounters:   03/28/22 5' 6\" (1.676 m)     Wt Readings from Last 1 Encounters:   03/29/22 238 lb 1.6 oz (108 kg)     Current & Prior Antimicrobial Regimen(s):    x1 (3/28)   Cefepime 2000 mg EI q12h (3/28-current)   Vancomycin - Pharmacy to dose   1250 mg IV q12h (3/28-current)     Vancomycin Level(s) / Doses:    Date Time Dose Level / Type of Level Interpretation   3/29 04:34 1250mg q12h Random = 11.8 mcg/mL · Drawn 5h after first dose given  · Calculated AUC = 551 with trough = 16.7          Note: Serum levels collected for AUC-based dosing may be high if collected in close proximity to the dose administered. This is not necessarily indicative of toxicity.     Cultures & Sensitivities:    Date Site Micro Susceptibility / Result   3/28 Blood x2 Sent    3/28 Urine Sent    3/29 MRSA nasal PCR ordered    3/29 Strep pneumo antigen ordered    3/29 Legionella antigen ordered            Labs / Ancillary Data:    Estimated Creatinine Clearance: 108 mL/min (based on SCr of 0.7 mg/dL).     Recent Labs     03/28/22  1003 03/28/22  1004 03/29/22  0433 03/29/22 0434   CREATININE  --  <0.5*  --  0.7   BUN  --  9  --  11   WBC 5.9  --  8.5  --        Recent Labs     03/29/22 0434   PROCAL 0.28*

## 2022-03-29 NOTE — H&P
Hospital Medicine History & Physical      PCP: No primary care provider on file. Date of Admission: 3/28/2022    Date of Service: 3/28/2022    Pt seen/examined on 3/28/2022    Admitted to Inpatient with expected LOS greater than two midnights due to medical therapy. Chief Complaint:     Chief Complaint   Patient presents with    Abdominal Pain    Emesis    Diarrhea       History Of Present Illness:      59 y.o. female who presented to [facility] with generalized abdominal pain began 1 week ago and has persisted since that time. Its associated with diarrhea. Her story is somewhat inconsistent but she describes diffuse abdominal pain and left lower back pain over the last 1 week. She carries a chart diagnosis of alcoholic cirrhosis though she denies this. Fall 2021 she was hospitalized for Covid and trached. At this time she states that she is in the hospital for trouble breathing. Past Medical History:      Reviewed and non-contributory except as noted below      Diagnosis Date    Hyperlipidemia     Tracheostomy in place [facility])        Past Surgical History:      Reviewed and non-contributory except as noted below  History reviewed. No pertinent surgical history. Medications Prior to Admission:      Reviewed and non-contributory except as noted below  Prior to Admission medications    Not on File       Allergies:     Reviewed and non-contributory except as noted below   Patient has no known allergies. Social History:      Reviewed and non-contributory except as noted below    TOBACCO:   reports that she has quit smoking. She has never used smokeless tobacco.  ETOH:   reports previous alcohol use. Family History:      Reviewed and non-contributory except as noted below    History reviewed. No pertinent family history. REVIEW OF SYSTEMS:   Pertinent positives and negatives as noted in the HPI. All other systems reviewed and negative.     PHYSICAL EXAM PERFORMED:    /76 Pulse 82   Temp 101.1 °F (38.4 °C) (Oral)   Resp 26   Wt 200 lb (90.7 kg)   SpO2 93%     General appearance:  Mild respiratory distress, appears stated age  Eyes: Pupils equal, round, reactive to light, conjunctiva/corneas clear  Ears/Nose/Mouth/Throat: No external lesions or scars, hearing intact to voice  Neck: Trachea midline, no masses noted, no thyromegaly  Respiratory:  Non-labored breathing, clear to auscultation bilaterally  Cardiovascular: Regular rate and rhythm, no murmurs, gallops, or rubs  Abdomen: soft, non-tender, non-distended  Musculoskeletal: Warm, well perfused, no cyanosis or edema  Skin: normal color, no wounds noted  Psychiatric: A&Ox4, good insight and judgment    Labs:     Recent Labs     03/28/22  1003   WBC 5.9   HGB 12.5   HCT 37.5   *     Recent Labs     03/28/22  1004   *   K 4.4      CO2 21   BUN 9   CREATININE <0.5*   CALCIUM 9.5     Recent Labs     03/28/22  1004   AST 35   ALT 16   BILITOT 2.3*   ALKPHOS 170*     No results for input(s): INR in the last 72 hours. Recent Labs     03/28/22  1004   TROPONINI <0.01       Urinalysis:      Lab Results   Component Value Date    NITRU POSITIVE 03/28/2022    WBCUA 6-9 03/28/2022    BACTERIA 2+ 03/28/2022    RBCUA 5-10 03/28/2022    BLOODU TRACE 03/28/2022    SPECGRAV 1.015 03/28/2022    GLUCOSEU Negative 03/28/2022       Radiology:     CT ABDOMEN PELVIS W IV CONTRAST Additional Contrast? None   Final Result   1. Cirrhotic appearing liver with stigmata of portal hypertension consisting of splenomegaly, small amount of ascites and left upper quadrant varices. 2. Distended gallbladder with cholelithiasis. This can be seen with prolonged fasting state or chronic cystic duct obstruction. 3. There is some mild wall thickening of the transverse limb of the duodenum with some adjacent retroperitoneal fat stranding. This may reflect a duodenitis.  Alternatively, this appearance may be caused by the duodenum being decompressed. This could be    further evaluated with endoscopy. 4. There is some retroperitoneal fat stranding identified within the right upper quadrant and right mid abdomen. This may be related to cirrhosis and portal hypertension. Reactive edema could result in this appearance in the setting of a duodenitis. ASSESSMENT:    Active Hospital Problems    Diagnosis Date Noted    Intractable abdominal pain [R10.9] 03/28/2022       PLAN:    #Suspected sepsis, unclear source,? Urinary  Blood and urine culture sent  Pro-Jignesh, check chest x-ray  Sepsis fluids  Vancomycin, cefepime  Trend lactate    #Abdominal pain  CT abdomen and pelvis showing chronic changes  Analgesia as needed  Serial abdominal exams    #Hypomagnesemia  Replete and recheck    #Chronic respiratory failure  Trach in place, check ABG    #Remainder of chronic conditions  Home management except as above    DVT Prophylaxis: Lovenox  Diet: Diet NPO  Code Status: Full Code    PT/OT Eval Status: Ongoing    Dispo: Frankey Sims pending clinical improvement    Marimar Moreland MD    Thank you No primary care provider on file. for the opportunity to be involved in this patient's care. If you have any questions or concerns please feel free to contact me at [phone].

## 2022-03-29 NOTE — CARE COORDINATION
Case Management Assessment           Initial Evaluation                Date / Time of Evaluation: 3/29/2022 2:59 PM                 Assessment Completed by: Luis Carlos Altamirano RN    Patient Name: Vasile Gamboa     YOB: 1965  Diagnosis: Left flank pain [R10.9]  Nausea vomiting and diarrhea [R11.2, R19.7]  Abdominal pain, unspecified abdominal location [R10.9]  Urinary tract infection without hematuria, site unspecified [N39.0]  Intractable abdominal pain [R10.9]     Date / Time: 3/28/2022  9:37 AM    Patient Admission Status: Inpatient    If patient is discharged prior to next notation, then this note serves as note for discharge by case management. Current PCP: No primary care provider on file. Clinic Patient: No    Chart Reviewed: Yes  Patient/ Family Interviewed: Yes    Initial assessment completed at bedside with: chart review (attempted to see patient/ sleeping    Hospitalization in the last 30 days: No    Emergency Contacts:  No emergency contact information on file. Advance Directives:   Code Status: Full Code    Healthcare Power of : No    Financial  Payor: Jesus Alberto Robles / Plan: MEDICARE PART A AND B / Product Type: *No Product type* /     Pre-cert required for SNF: Yes    Pharmacy    [facility] [id number] [id number] - [place], [address] [place] [address] [address] [facility] [address]  Phone: [phone] Fax: [phone]      Potential assistance Purchasing Medications:    Does Patient want to participate in local refill/ meds to beds program?:      Meds To Beds General Rules:  1. Can ONLY be done Monday- Friday between 8:30am-5pm  2. Prescription(s) must be in pharmacy by 3pm to be filled same day  3. Copy of patient's insurance/ prescription drug card and patient face sheet must be sent along with the prescription(s)  4. Cost of Rx cannot be added to hospital bill.  If financial assistance is needed, please contact unit  or ;  or  CANNOT provide pharmacy voucher for patients co-pays  5. Patients can then  the prescription on their way out of the hospital at discharge, or pharmacy can deliver to the bedside if staff is available. (payment due at time of pick-up or delivery - cash, check, or card accepted)     Able to afford home medications/ co-pay costs: Yes    ADLS  Support Systems: Friends/Neighbors,Family Members    PT AM-PAC: 17 /24  OT AM-PAC: 16 /24    [place]: lives alone  Lives With: Alone  Type of Home: Apartment (1st floor)  Home Layout: One level  Home Access: Stairs to enter with rails  Bathroom Shower/Tub: Tub/Shower unit  Bathroom Toilet: Standard  Bathroom Equipment:  (None)  Home Equipment:  (None)  ADL Assistance: Independent  Ambulation Assistance: Independent  Transfer Assistance: Independent  Active : Yes  Occupation: On disability  Additional Comments: No falls  Plans to RETURN to current housing: Yes  Barriers to RETURNING to current housing: medical complications  Home Care Information  Currently ACTIVE with [address]: No  Home Care Agency: Not Applicable    Currently ACTIVE with [place] on Aging: No    Durable Medical Equipment  DME Provider: Sam [address]: none      DISCHARGE PLAN:  Disposition: [place] (SNF): TBD  Phone: 999 Fax: DALIA [place] [address] w/ aide 24/7 at this time suggested by therapy     Transportation PLAN for discharge: TBD     Factors facilitating achievement of predicted outcomes: Cooperative    Barriers to discharge: Medical complications    Additional Case Management Notes: Patient here for one week hx of abdominal pain, left sided back pain, nausea, vomiting, and diarrhea. Patient unable to visit due to COVID R/O and now resting/asleep after pain medication. Patient has a chronic trach. Patient is independent at home  With a trach to room air. She is an active . Has no equipment at home. Has no emergency contacts to interview. PT has sugg: SNF on discharge and, if goes home, then [facility] and 24/7 care, possibly [facility]. Patient is on disability. The Plan for Transition of Care is related to the following treatment goals of Left flank pain [R10.9]  Nausea vomiting and diarrhea [R11.2, R19.7]  Abdominal pain, unspecified abdominal location [R10.9]  Urinary tract infection without hematuria, site unspecified [N39.0]  Intractable abdominal pain [R10.9]    The Patient and/or patient representative NITA and her family were provided with a choice of provider and agrees with the discharge plan Yes    Freedom of choice list was provided with basic dialogue that supports the patient's individualized plan of care/goals and shares the quality data associated with the providers.  Yes    Care Transition patient: No    Yan Laguna RN  [facility]  Case Management Department  Ph: [phone]

## 2022-03-29 NOTE — PROGRESS NOTES
Occupational Therapy   Occupational Therapy Initial Assessment/Treatment  Date: 3/29/2022   Patient Name: Eddie Franco  MRN: [id number]     : 1965    Date of Service: 3/29/2022    Discharge Recommendations:    NITA Etienne scored a 16/24 on the AM-PAC ADL Inpatient form. Current research shows that an AM-PAC score of 17 or less is typically not associated with a discharge to the patient's home setting. Based on the patient's AM-PAC score and their current ADL deficits, it is recommended that the patient have 3-5 sessions per week of Occupational Therapy at d/c to increase the patient's independence. Please see assessment section for further patient specific details. If patient discharges prior to next session this note will serve as a discharge summary. Please see below for the latest assessment towards goals. OT Equipment Recommendations  Equipment Needed: Yes  Mobility Devices: ADL Assistive Devices  ADL Assistive Devices: Reacher  Other: To further assess    Assessment   Performance deficits / Impairments: Decreased functional mobility ; Decreased ADL status; Decreased endurance  Assessment: Pt presents with a decline in functional independence secondary to c/o back pain. Pt is from home alone and independent. Currently, pt req CG-SBA for mobility and assist with ADL secondary to c/o back pain. Feel pt would benefit from furthet OT services. Treatment Diagnosis: Impaired ADL and functional mobility  Prognosis: Good  Decision Making: Medium Complexity  OT Education: OT Role;Plan of Care;Transfer Training  Patient Education: Pt verbalized understanding, needs reinforcement  REQUIRES OT FOLLOW UP: Yes  Activity Tolerance  Activity Tolerance: Patient limited by pain  Safety Devices  Safety Devices in place: Yes  Type of devices: Left in chair;Call light within reach;Nurse notified; Chair alarm in place         Treatment Diagnosis: Impaired ADL and functional mobility      Restrictions  Position Activity Restriction  Other position/activity restrictions: Up as tolerated    Subjective   General  Chart Reviewed: Yes  Additional Pertinent Hx: Pt admitted 3/28/22 with abdominal pain, left-sided back pain, nausea, vomiting, and diarrhea. PMH includes: COVID-19, chronic trach  Family / Caregiver Present: No  Referring Practitioner: Dr. Dayan Arteaga  Diagnosis: Left flank pain  Subjective  Subjective: Pt in bed upon entry. Pt declined several activities secondary to c/o back pain.   Pain Assessment  Pain Assessment: 0-10  Pain Level: 8/10, Nurse aware  Social/Functional History  Social/Functional History  Lives With: Alone  Type of Home: Apartment (1st floor)  Home Layout: One level  Home Access: Stairs to enter with rails  Bathroom Shower/Tub: Tub/Shower unit  Bathroom Toilet: Standard  Bathroom Equipment:  (None)  Home Equipment:  (None)  ADL Assistance: Independent  Ambulation Assistance: Independent  Transfer Assistance: Independent  Active : Yes  Occupation: On disability  Additional Comments: No falls       Objective   Vision: Within Functional Limits  Hearing: Within functional limits    Orientation  Overall Orientation Status: Within Normal Limits     Balance  Sitting Balance: Stand by assistance  Standing Balance: Stand by assistance  Standing Balance  Time: ~2 min  Activity: room mobility  Functional Mobility  Functional - Mobility Device: Rolling Walker  Activity: To/from bathroom  Assist Level: Stand by assistance  ADL  Grooming:  (Pt declined)  LE Dressing: Minimal assistance  Toileting:  (denied need)  Tone RUE  RUE Tone: Normotonic  Tone LUE  LUE Tone: Normotonic  Coordination  Movements Are Fluid And Coordinated: Yes     Bed mobility  Supine to Sit: Stand by assistance  Scooting: Stand by assistance  Transfers  Stand Step Transfers: Contact guard assistance (to SBA +cues)  Sit to stand: Contact guard assistance (to SBA +cues)  Stand to sit: Contact guard assistance (to SBA +cues) LUE AROM (degrees)  LUE AROM : WFL  RUE AROM (degrees)  RUE AROM : WFL        Hand Dominance  Hand Dominance: Right         Treatment included ADL and transfer training. Plan   Plan  Times per week: 2-5  Current Treatment Recommendations: Strengthening,ROM,Balance Training,Functional Mobility Training,Endurance Training,Self-Care / ADL,Equipment Evaluation, Education, & procurement    AM-PAC Score        AM-PAC Inpatient Daily Activity Raw Score: 16 (03/29/22 1114)  AM-PAC Inpatient ADL T-Scale Score : 35.96 (03/29/22 1114)  ADL Inpatient CMS 0-100% Score: 53.32 (03/29/22 1114)  ADL Inpatient CMS G-Code Modifier : CK (03/29/22 1114)    Goals                          No goals met  Short term goals  Time Frame for Short term goals: Discharge  Short term goal 1: Transfer to/from toilet with supervision  Short term goal 2: Stance with supervision x5 min while engaging in ADL/functional mobility  Short term goal 3: Lower body dressing with supervision and assistive devices as needed  Patient Goals   Patient goals : Go home. Decrease pain.        Therapy Time   Individual Concurrent Group Co-treatment   Time In 0901         Time Out 0939         Minutes 38         Timed Code Treatment Minutes: 28 Minutes    Total Treatment 1000 Nathan Vizcarra, OTR/L [id number]

## 2022-03-29 NOTE — PROGRESS NOTES
Pt arrived to 6S bed 6301 at 1900. Pt AOX4, resting in bed. VSS on RA with elevated temperature, MD aware. Tylenol to be administered. Fall precautions in place with low bed, bed alarm on, non-skid socks on, bedside table and belongings within reach.

## 2022-03-29 NOTE — PROGRESS NOTES
Physical Therapy    Facility/Department: [address]  Initial Assessment and Treatment    NAME: NITA Etienne  : 1965  MRN: [id number]    Date of Service: 3/29/2022    Discharge Recommendations:    NITA Etienne scored a  17/24 on the AM-PAC short mobility form. Current research shows that an AM-PAC score of 17 or less is typically not associated with a discharge to the patient's home setting. Based on the patient's AM-PAC score and their current functional mobility deficits, it is recommended that the patient have 3-5 sessions per week of Physical Therapy at d/c to increase the patient's independence. Please see assessment section for further patient specific details. If patient discharges prior to next session this note will serve as a discharge summary. Please see below for the latest assessment towards goals. PT Equipment Recommendations  Equipment Needed: No    Assessment   Assessment: Pt is 60 yo F admitted with L flank pain. Pt normally lives at home alone in an apartment and cares for herself. Pt presently ambulating with assist of 1 using wheeled walker and has low endurance for activity, stating it is hard to breathe. Pt does have a tracheostomy and her O2 sats remain high on RA. Pt also having significant back pain limiting her mobility. Rec SNF at d/c prior to return home. If home, rec 24 hour assist and home PT/HHA and wheeled walker. Will follow. Treatment Diagnosis: Decreased endurance for activity  Prognosis: Good  Decision Making: Medium Complexity  PT Education: PT Role;General Safety  Patient Education: Pt verbalized understanding  REQUIRES PT FOLLOW UP: Yes  Activity Tolerance  Activity Tolerance: Patient limited by fatigue;Patient limited by endurance       Patient Diagnosis(es): The primary encounter diagnosis was Abdominal pain, unspecified abdominal location.  Diagnoses of Nausea vomiting and diarrhea, Left flank pain, and Urinary tract infection without step length;Decreased step height  Distance: 3 feet and 40 feet  Comments: Pt requests to sit following gait due to SOB. 97% O2 sats following gait on RA.      Balance  Sitting - Static: Fair  Standing - Static: Fair (With walker)  Standing - Dynamic: Fair;- (With walker)    Treatment:  Functional mobility training and pt education    Plan   Plan  Times per week: 2-5  Current Treatment Recommendations: Strengthening,Transfer Training,Balance Training,Gait Training,Functional Mobility Training,Safety Education & Training,Stair training  Safety Devices  Type of devices: Call light within reach,Chair alarm in place,Left in chair,Nurse notified    Goals  Short term goals  Time Frame for Short term goals: by discharge  Short term goal 1: Pt will perform bed mobility with supervision  Short term goal 2: Pt will transfer sit to and from stand with supervision  Short term goal 3: Pt will ambulate 100 feet with LRAD and supervision  Short term goal 4: Pt will ambulate up and down 1 flight stairs with rail and SBA    Therapy Time   Individual Concurrent Group Co-treatment   Time In 0900         Time Out 0932         Minutes 32           Timed Code Treatment Minutes:   17    Total Treatment Minutes:  [address] [facility], PT

## 2022-03-29 NOTE — CONSULTS
Clinical Pharmacy Progress Note    Vancomycin - Management by Pharmacy    Consult Date(s): 3/28  Consulting Provider(s): Dr. Luz Caraballo / Plan  Possible UTI/Intra-abdominal infection - Vancomycin   Concurrent Antimicrobials: Cefepime   Day of Vanc Therapy: 1   Current Dosing Method: Bayesian-Guided AUC Dosing   Therapeutic Goal: 400-600 mg/L*hr   Current Dose / Frequency: 1250 mg every 12 hours   Plan / Rationale:   o Will start vancomycin 1250 mg IV q12h. Estimated  mg/L*hr and Trough 13.6 mg/L.   o Will order a random level for tomorrow AM to assess patient-specific kinetics.  Will continue to monitor clinical condition and make adjustments to regimen as appropriate. Thank you for consulting Pharmacy! Please call with any questions. aJson Narayanan, PharmD  Main Pharmacy: [id number]  3/28/2022 8:36 PM      Interval update:     Subjective/Objective: Ms. Jordan Anderson is a 59 y.o. female with a PMHx significant for HLD, Alcoholic cirrhosis, [id number] PNA causing trach placement, admitted for abdominal pain and possible UTI. Pharmacy has been consulted to dose vancomycin. Height:   Ht Readings from Last 1 Encounters:   03/28/22 5' 6\" (1.676 m)     Weight:   Wt Readings from Last 1 Encounters:   03/28/22 200 lb (90.7 kg)     Current & Prior Antimicrobial Regimen(s):   Ceftriaxone 1000 mg IV x1 (3/28)   Cefepime 2000 mg EI q12h (3/28-current)   Vancomycin  o 1250 mg IV q12h (3/28-current)    Level(s) / Doses:    Date Time Dose Level / Type of Level Interpretation   3/29 0600 1250 mg IV Q12h Random = ordered           Note: Serum levels collected for AUC-based dosing may be high if collected in close proximity to the dose administered. This is not necessarily indicative of toxicity.     Cultures & Sensitivities:    Date Site Micro Susceptibility / Result   3/28 Blood Sent    3/28 Urine Sent      Labs / Ancillary Data:    CrCl cannot be calculated (This lab value cannot be used to calculate CrCl because it is not a number: <0.5).     Recent Labs     03/28/22  1003 03/28/22  1004   CREATININE  --  <0.5*   BUN  --  9   WBC 5.9  --

## 2022-03-29 NOTE — PROGRESS NOTES
Physician Progress Note      Jaida Dykes  [facility] #:                  [id number]  :                       1965  ADMIT DATE:       3/28/2022 9:37 AM  DISCH DATE:  RESPONDING  PROVIDER #:        Myrna Rangel MD          QUERY TEXT:    Patient admitted with sepsis. Noted documentation of \"Chronic respiratory   failure\". Pt has tracheostomy in place, however is on room air. In order to   support the diagnosis of Chronic respiratory failure, please include   additional clinical indicators in your documentation as well as type. Or   please document if the diagnosis of Chronic respiratory failure has been ruled   out after further study. The medical record reflects the following:  Risk Factors: Trach status since Covid-19 in fall of last year, Previous   smoker  Clinical Indicators: Per H&P \"Chronic respiratory failure. Trach in place,   check ABG\". ABG: PH: 7.47, PcO2: 28, PaO2:70.7. Treatment: Room air, ABG, CXR  Options provided:  -- Chronic hypoxic respiratory failure present as evidenced by, Please   document evidence. -- Chronic hypercapnic respiratory failure present as evidenced by, Please   document evidence. -- Chronic respiratory failure was ruled out  -- Other - I will add my own diagnosis  -- Disagree - Not applicable / Not valid  -- Disagree - Clinically unable to determine / Unknown  -- Refer to Clinical Documentation Reviewer    PROVIDER RESPONSE TEXT:    Chronic respiratory failure was ruled out after study. Query created by:  Kelli Rowe on 3/29/2022 10:46 AM      Electronically signed by:  Myrna Rangel MD 3/29/2022 10:51 AM

## 2022-03-30 LAB
MRSA SCREEN RT-PCR: NORMAL
ORGANISM: ABNORMAL
ORGANISM: ABNORMAL
URINE CULTURE, ROUTINE: ABNORMAL
URINE CULTURE, ROUTINE: ABNORMAL
VANCOMYCIN RANDOM: 12.1 UG/ML

## 2022-03-30 PROCEDURE — 6360000002 HC RX W HCPCS: Performed by: INTERNAL MEDICINE

## 2022-03-30 PROCEDURE — 6370000000 HC RX 637 (ALT 250 FOR IP): Performed by: INTERNAL MEDICINE

## 2022-03-30 PROCEDURE — 87449 NOS EACH ORGANISM AG IA: CPT

## 2022-03-30 PROCEDURE — 1200000000 HC SEMI PRIVATE

## 2022-03-30 PROCEDURE — 80202 ASSAY OF VANCOMYCIN: CPT

## 2022-03-30 PROCEDURE — 36415 COLL VENOUS BLD VENIPUNCTURE: CPT

## 2022-03-30 PROCEDURE — 2580000003 HC RX 258: Performed by: INTERNAL MEDICINE

## 2022-03-30 RX ORDER — PANTOPRAZOLE SODIUM 40 MG/1
40 TABLET, DELAYED RELEASE ORAL
Status: DISCONTINUED | OUTPATIENT
Start: 2022-03-31 | End: 2022-03-31

## 2022-03-30 RX ADMIN — ENOXAPARIN SODIUM 40 MG: 100 INJECTION SUBCUTANEOUS at 08:24

## 2022-03-30 RX ADMIN — ONDANSETRON 4 MG: 2 INJECTION INTRAMUSCULAR; INTRAVENOUS at 15:15

## 2022-03-30 RX ADMIN — HYDROMORPHONE HYDROCHLORIDE 0.5 MG: 1 INJECTION, SOLUTION INTRAMUSCULAR; INTRAVENOUS; SUBCUTANEOUS at 00:14

## 2022-03-30 RX ADMIN — SODIUM CHLORIDE, PRESERVATIVE FREE 10 ML: 5 INJECTION INTRAVENOUS at 20:38

## 2022-03-30 RX ADMIN — HYDROMORPHONE HYDROCHLORIDE 0.5 MG: 1 INJECTION, SOLUTION INTRAMUSCULAR; INTRAVENOUS; SUBCUTANEOUS at 10:49

## 2022-03-30 RX ADMIN — SODIUM CHLORIDE: 9 INJECTION, SOLUTION INTRAVENOUS at 08:28

## 2022-03-30 RX ADMIN — SODIUM CHLORIDE: 9 INJECTION, SOLUTION INTRAVENOUS at 10:16

## 2022-03-30 RX ADMIN — GUAIFENESIN SYRUP AND DEXTROMETHORPHAN 10 ML: 100; 10 SYRUP ORAL at 07:55

## 2022-03-30 RX ADMIN — HYDROMORPHONE HYDROCHLORIDE 1 MG: 1 INJECTION, SOLUTION INTRAMUSCULAR; INTRAVENOUS; SUBCUTANEOUS at 15:14

## 2022-03-30 RX ADMIN — SODIUM CHLORIDE: 9 INJECTION, SOLUTION INTRAVENOUS at 15:25

## 2022-03-30 RX ADMIN — GUAIFENESIN SYRUP AND DEXTROMETHORPHAN 10 ML: 100; 10 SYRUP ORAL at 00:14

## 2022-03-30 RX ADMIN — HYDROMORPHONE HYDROCHLORIDE 0.5 MG: 1 INJECTION, SOLUTION INTRAMUSCULAR; INTRAVENOUS; SUBCUTANEOUS at 07:52

## 2022-03-30 RX ADMIN — HYDROMORPHONE HYDROCHLORIDE 1 MG: 1 INJECTION, SOLUTION INTRAMUSCULAR; INTRAVENOUS; SUBCUTANEOUS at 18:55

## 2022-03-30 RX ADMIN — SODIUM CHLORIDE, PRESERVATIVE FREE 10 ML: 5 INJECTION INTRAVENOUS at 10:18

## 2022-03-30 RX ADMIN — CEFEPIME HYDROCHLORIDE 2000 MG: 2 INJECTION, POWDER, FOR SOLUTION INTRAVENOUS at 08:29

## 2022-03-30 RX ADMIN — VANCOMYCIN HYDROCHLORIDE 1250 MG: 10 INJECTION, POWDER, LYOPHILIZED, FOR SOLUTION INTRAVENOUS at 10:16

## 2022-03-30 RX ADMIN — SODIUM CHLORIDE, PRESERVATIVE FREE 10 ML: 5 INJECTION INTRAVENOUS at 20:39

## 2022-03-30 RX ADMIN — SODIUM CHLORIDE, PRESERVATIVE FREE 10 ML: 5 INJECTION INTRAVENOUS at 08:32

## 2022-03-30 RX ADMIN — PROMETHAZINE HYDROCHLORIDE 12.5 MG: 12.5 TABLET ORAL at 20:37

## 2022-03-30 RX ADMIN — CEFEPIME HYDROCHLORIDE 2000 MG: 2 INJECTION, POWDER, FOR SOLUTION INTRAVENOUS at 15:27

## 2022-03-30 RX ADMIN — HYDROMORPHONE HYDROCHLORIDE 1 MG: 1 INJECTION, SOLUTION INTRAMUSCULAR; INTRAVENOUS; SUBCUTANEOUS at 22:36

## 2022-03-30 RX ADMIN — HYDROMORPHONE HYDROCHLORIDE 0.5 MG: 1 INJECTION, SOLUTION INTRAMUSCULAR; INTRAVENOUS; SUBCUTANEOUS at 04:18

## 2022-03-30 RX ADMIN — SODIUM CHLORIDE, PRESERVATIVE FREE 20 ML: 5 INJECTION INTRAVENOUS at 10:18

## 2022-03-30 RX ADMIN — SENNOSIDES AND DOCUSATE SODIUM 1 TABLET: 50; 8.6 TABLET ORAL at 20:37

## 2022-03-30 RX ADMIN — GUAIFENESIN SYRUP AND DEXTROMETHORPHAN 10 ML: 100; 10 SYRUP ORAL at 20:37

## 2022-03-30 RX ADMIN — CEFEPIME HYDROCHLORIDE 2000 MG: 2 INJECTION, POWDER, FOR SOLUTION INTRAVENOUS at 23:24

## 2022-03-30 ASSESSMENT — PAIN SCALES - GENERAL
PAINLEVEL_OUTOF10: 9
PAINLEVEL_OUTOF10: 9
PAINLEVEL_OUTOF10: 7
PAINLEVEL_OUTOF10: 10
PAINLEVEL_OUTOF10: 8
PAINLEVEL_OUTOF10: 9
PAINLEVEL_OUTOF10: 9
PAINLEVEL_OUTOF10: 8
PAINLEVEL_OUTOF10: 9
PAINLEVEL_OUTOF10: 8
PAINLEVEL_OUTOF10: 8

## 2022-03-30 ASSESSMENT — PAIN DESCRIPTION - ONSET
ONSET: AWAKENED FROM SLEEP
ONSET: ON-GOING

## 2022-03-30 ASSESSMENT — PAIN DESCRIPTION - LOCATION
LOCATION: ABDOMEN;BACK

## 2022-03-30 ASSESSMENT — PAIN DESCRIPTION - PROGRESSION
CLINICAL_PROGRESSION: RAPIDLY WORSENING
CLINICAL_PROGRESSION: NOT CHANGED
CLINICAL_PROGRESSION: NOT CHANGED

## 2022-03-30 ASSESSMENT — PAIN DESCRIPTION - DESCRIPTORS
DESCRIPTORS: ACHING;CONSTANT;DISCOMFORT

## 2022-03-30 ASSESSMENT — PAIN DESCRIPTION - PAIN TYPE
TYPE: ACUTE PAIN

## 2022-03-30 ASSESSMENT — PAIN DESCRIPTION - FREQUENCY
FREQUENCY: CONTINUOUS

## 2022-03-30 ASSESSMENT — PAIN DESCRIPTION - ORIENTATION
ORIENTATION: RIGHT;LEFT
ORIENTATION: LEFT;RIGHT
ORIENTATION: LEFT;RIGHT

## 2022-03-30 NOTE — CARE COORDINATION
Case Management Assessment           Daily Note                 Date/ Time of Note: 3/30/2022 5:32 PM         Note completed by: Debby Tanner RN    Patient Name: Caleb Carmona  YOB: 1965    Diagnosis:Left flank pain [R10.9]  Nausea vomiting and diarrhea [R11.2, R19.7]  Abdominal pain, unspecified abdominal location [R10.9]  Urinary tract infection without hematuria, site unspecified [N39.0]  Intractable abdominal pain [R10.9]  Patient Admission Status: Inpatient    Date of Admission:3/28/2022  9:37 AM Length of Stay: 2 GLOS: GMLOS: 3.5    Current Plan of Care: possible SNF  ________________________________________________________________________________________  PT AM-PAC: 16 / 24 per last evaluation on: 3/29/22    OT AM-PAC: 16 / 24 per last evaluation on: 3/29/22    DME Needs for discharge: TBD  ________________________________________________________________________________________  Discharge Plan: SNF: Facility near [place]    Tentative discharge date: 04/01     Current barriers to discharge: needs placement    Referrals completed: SNF: TBD    Resources/ information provided: [facility] List  ________________________________________________________________________________________  Case Management Notes:Met with pt at bedside. She knows that she was previously at a facility but isn't sure of the name. Staff reports that she was at [address] does not think that she wants to return there but she agrees that she cannot return home by herself and may need skilled nursing placement at discharge. When offered choice, she does not have any specific choices but would be agreeable to a facility near her apartment which is in [place]. Therapy evals support nned for SNF level of care. Will plan to identify facilities in [place] area and make referrals tomorrow. Addendum: Upon review of old records, she had an ER visit at [facility] on Mar 7. She was sent in and returned to [address] [place] [address].   She also had a son listed as an emergency contact: Noemi Hernandez at [phone] [facility]). NITA and her family were provided with choice of provider; she and her family are in agreement with the discharge plan.     Care Transition Patient: Maddie Johnson RN  [facility]  Case Management Department  Ph: [phone]  Fax: [phone]

## 2022-03-30 NOTE — PROGRESS NOTES
Hospitalist Progress Note      PCP: No primary care provider on file. Date of Admission: 3/28/2022    Chief Complaint:     Chief Complaint   Patient presents with    Abdominal Pain    Emesis    Diarrhea       Subjective:  Patient seen and examined at the bedside. No complaints at this time.   No acute events overnight  MRSA nares negative, will stop vancomycin  Continue cefepime, day 3  Blood cultures negative  Urine culture sent, results pending  Covid negative  No further diarrhea, will discontinue C. difficile order  She is requiring Dilaudid every 3 hours for abdominal pain, will consult GI    PFHS: reviewed as documented 3/28/2022, no changes unless noted above    Medications:  Reviewed    Infusion Medications    sodium chloride 5 mL/hr at 03/30/22 1016    sodium chloride 10 mL/hr at 03/30/22 0545     Scheduled Medications    cefepime  2,000 mg IntraVENous Q8H    sodium chloride flush  10 mL IntraVENous 2 times per day    sennosides-docusate sodium  1 tablet Oral BID    enoxaparin  40 mg SubCUTAneous Daily    sodium chloride flush  5-40 mL IntraVENous 2 times per day    vancomycin  1,250 mg IntraVENous Q12H     PRN Meds: ipratropium-albuterol, HYDROmorphone **OR** HYDROmorphone, guaiFENesin-dextromethorphan, sodium chloride flush, sodium chloride, promethazine **OR** ondansetron, polyethylene glycol, acetaminophen **OR** acetaminophen, sodium chloride flush, sodium chloride      Intake/Output Summary (Last 24 hours) at 3/30/2022 1037  Last data filed at 3/30/2022 0656  Gross per 24 hour   Intake --   Output 700 ml   Net -700 ml       Physical Exam    /71   Pulse 84   Temp 100.1 °F (37.8 °C) (Oral)   Resp 22   Ht 5' 6\" (1.676 m)   Wt 238 lb 1.6 oz (108 kg)   SpO2 94%   BMI 38.43 kg/m²     General appearance:  No acute distress, appears stated age  Eyes: Pupils equal, round, reactive to light, conjunctiva/corneas clear  Ears/Nose/Mouth/Throat: No external lesions or scars, hearing intact to voice  Neck: Trachea midline, no masses noted, no thyromegaly  Respiratory:  Non-labored breathing, clear to auscultation bilaterally  Cardiovascular: Regular rate and rhythm, no murmurs, gallops, or rubs  Abdomen: Soft, diffusely tender  Musculoskeletal: Warm, well perfused, no cyanosis or edema  Skin: normal color, no wounds noted  Psychiatric: A&Ox4, good insight and judgment    Labs:   Recent Labs     03/28/22  1003 03/29/22  0433   WBC 5.9 8.5   HGB 12.5 11.0*   HCT 37.5 32.0*   * 90*     Recent Labs     03/28/22  1004 03/29/22  0434   * 133*   K 4.4 3.8    102   CO2 21 20*   BUN 9 11   CREATININE <0.5* 0.7   CALCIUM 9.5 8.4     Recent Labs     03/28/22  1004   AST 35   ALT 16   BILITOT 2.3*   ALKPHOS 170*     No results for input(s): INR in the last 72 hours. Recent Labs     03/28/22  1004 03/28/22  2251   CKTOTAL  --  35   TROPONINI <0.01  --        Urinalysis:      Lab Results   Component Value Date    NITRU POSITIVE 03/28/2022    WBCUA 6-9 03/28/2022    BACTERIA 2+ 03/28/2022    RBCUA 5-10 03/28/2022    BLOODU TRACE 03/28/2022    SPECGRAV 1.015 03/28/2022    GLUCOSEU Negative 03/28/2022       Radiology:  XR CHEST PORTABLE   Final Result      Diffuse bilateral airspace disease, greater on the left. CT ABDOMEN PELVIS W IV CONTRAST Additional Contrast? None   Final Result   1. Cirrhotic appearing liver with stigmata of portal hypertension consisting of splenomegaly, small amount of ascites and left upper quadrant varices. 2. Distended gallbladder with cholelithiasis. This can be seen with prolonged fasting state or chronic cystic duct obstruction. 3. There is some mild wall thickening of the transverse limb of the duodenum with some adjacent retroperitoneal fat stranding. This may reflect a duodenitis. Alternatively, this appearance may be caused by the duodenum being decompressed. This could be    further evaluated with endoscopy.    4. There is some retroperitoneal fat stranding identified within the right upper quadrant and right mid abdomen. This may be related to cirrhosis and portal hypertension. Reactive edema could result in this appearance in the setting of a duodenitis. Assessment/Plan:    Active Hospital Problems    Diagnosis Date Noted    Intractable abdominal pain [R10.9] 03/28/2022       Plan:    #Intractable abdomion pain  CT abdomen pelvis as above, no acute findings  No clear etiology has been identified  She is requiring Dilaudid every 3 hours  GI consulted    #Sepsis  Initially on ceftriaxone for presumed urinary etiology based on UA  Pro-Jignesh found to be 0.28, antibiotics broadened to vancomycin and cefepime  MRSA nares 3/30, will stop vancomycin  Blood cultures no growth to date  Urine culture no results yet  Covid negative  Respiratory status seems to be improving    #Hypomagnesemia  Repleted    #Remainder of chronic conditions  Home management except as above    DVT Prophylaxis: Lovenox  Diet: ADULT DIET;  Regular  Code Status: Full Code    PT/OT Eval Status: Ongoing    Dispo: rPiti Gaona pending clinical improvement    Andreina Gresham MD

## 2022-03-30 NOTE — PROGRESS NOTES
Pt calm and cooperative, AOX4. VSS on RA. Pt c/o 8-10/10 pain in legs and back - Hydromorphone administered with moderate relief. Pt with strong, non-productive cough, relieved with robitussin. Asim Edgar in place for incontinence.

## 2022-03-30 NOTE — CONSULTS
Consult Note      NITA ALLEN [place]  1965    Consultant:   Reason for Consult:  Abdominal pain  Requesting Physician:  Dr. Birdie Pulido:  Abdominal pain, nausea, and vomiting    History Obtained From:  patient    HISTORY OF PRESENT ILLNESS:                The patient is a 59 y.o. female with significant past medical history of alcoholic liver cirrhosis, DM who presents with chief complaint of abdominal pain. Patient mentions that she has been having abdominal pain for a week. She describes her pain as sharp, diffuse, nonradiating, constant. Patient mentions that she also has been having nausea and nonbloody vomiting. Her last episode of vomiting was last night. Reports that nothing makes her abdominal pain better. However, food makes her abdominal pain worse. She also endorses diarrhea since her abdominal pain is started. Reports loose dark his stool for a week. She mentions that her diarrhea has improved and her last bowel movement was 2 days ago when she arrived in the hospital.  Patient mentions that she she was told to have a history of alcoholic liver cirrhosis. She has quite drinking alcohol a year ago. She has not seen GI physician and does not remember if she had colonoscopy or EGD. Per chart, patient was hospitalized for Covid and tract in fall 2021. In ED, temperature 97.9 °F, respiratory rate 18, heart rate 60, blood pressure 160/79, SPO2 100% on room air. Labs were significant for alkaline phosphatase of 170, bilirubin 2.3. CT abdomen and pelvis was done and showed cirrhotic appearing liver with stigmata of portal hypertension consisting of splenomegaly, small amount of ascites and left upper quadrant varices. Distended gallbladder with cholelithiasis. This can be seen with prolonged fasting state or chronic cystic duct obstruction. There is some mild wall thickening of the transverse limb of the duodenum with some adjacent retroperitoneal fat stranding.  This may reflect a duodenitis. Alternatively, this appearance may be caused by the duodenum being decompressed. This could be further evaluated with endoscopy. There is some retroperitoneal fat stranding identified within the right upper quadrant and right mid abdomen. This may be related to cirrhosis and portal hypertension. Reactive edema could result in this appearance in the setting of a duodenitis. Past Medical History:        Diagnosis Date    Hyperlipidemia     Tracheostomy in place [facility])      Past Surgical History:    History reviewed. No pertinent surgical history. Medications at Home:  No medications prior to admission.   Current Medications:    Current Facility-Administered Medications: [COMPLETED] cefepime (MAXIPIME) 2000 mg IVPB minibag, 2,000 mg, IntraVENous, Once **FOLLOWED BY** cefepime (MAXIPIME) 2000 mg IVPB minibag, 2,000 mg, IntraVENous, Q8H  ipratropium-albuterol (DUONEB) nebulizer solution 1 ampule, 1 ampule, Inhalation, Q4H PRN  HYDROmorphone (DILAUDID) injection 0.25 mg, 0.25 mg, IntraVENous, Q3H PRN **OR** HYDROmorphone (DILAUDID) injection 0.5 mg, 0.5 mg, IntraVENous, Q3H PRN  guaiFENesin-dextromethorphan (ROBITUSSIN DM) 100-10 MG/5ML syrup 10 mL, 10 mL, Oral, Q6H PRN  sodium chloride flush 0.9 % injection 10 mL, 10 mL, IntraVENous, 2 times per day  sodium chloride flush 0.9 % injection 10 mL, 10 mL, IntraVENous, PRN  0.9 % sodium chloride infusion, , IntraVENous, PRN  promethazine (PHENERGAN) tablet 12.5 mg, 12.5 mg, Oral, Q6H PRN **OR** ondansetron (ZOFRAN) injection 4 mg, 4 mg, IntraVENous, Q6H PRN  sennosides-docusate sodium (SENOKOT-S) 8.6-50 MG tablet 1 tablet, 1 tablet, Oral, BID  polyethylene glycol (GLYCOLAX) packet 17 g, 17 g, Oral, Daily PRN  acetaminophen (TYLENOL) tablet 650 mg, 650 mg, Oral, Q6H PRN **OR** acetaminophen (TYLENOL) suppository 650 mg, 650 mg, Rectal, Q6H PRN  enoxaparin (LOVENOX) injection 40 mg, 40 mg, SubCUTAneous, Daily  sodium chloride flush 0.9 % injection 5-40 mL, 5-40 mL, IntraVENous, 2 times per day  sodium chloride flush 0.9 % injection 5-40 mL, 5-40 mL, IntraVENous, PRN  0.9 % sodium chloride infusion, , IntraVENous, PRN  Allergies:  Patient has no known allergies. Family History:   History reviewed. No pertinent family history. REVIEW OF SYSTEMS:    A comprehensive 12 point review of systems is negative except as documented above. PHYSICAL EXAM:      Vitals:    /71   Pulse 84   Temp 100.1 °F (37.8 °C) (Oral)   Resp 22   Ht 5' 6\" (1.676 m)   Wt 238 lb 1.6 oz (108 kg)   SpO2 94%   BMI 38.43 kg/m²     General: No acute distress  HEENT: PERRL, EOMI, oral mucosa moist and intact, no sclericterus  Neck: no thyromegaly  Lymphatic: no cervical or supraclavicular lymphadenopathy  Heart: no m/r/g; +s1/s2 rrr  Lungs: CTA bilaterally  Abdomen: Soft, non tender to palpation, no guarding or rebound tenderness, negative alvarez sign, No hepatosplenomegaly   Extremities: 2+pulses, no edema  Skin: no rashes or lesions  Neuro: a&o x 3; no gross deficit  DATA:    Recent Labs     03/28/22  1003 03/29/22  0433   WBC 5.9 8.5   HGB 12.5 11.0*   HCT 37.5 32.0*   MCV 89.0 88.1   * 90*     Recent Labs     03/28/22  1004 03/29/22  0434   * 133*   K 4.4 3.8    102   CO2 21 20*   BUN 9 11   CREATININE <0.5* 0.7     Recent Labs     03/28/22  1004   AST 35   ALT 16   BILITOT 2.3*   ALKPHOS 170*     Recent Labs     03/28/22  1004   LIPASE 26.0     Recent Labs     03/28/22  1004   PROT 7.5     No results for input(s): PTT in the last 72 hours. No results for input(s): OCCULTBLD in the last 72 hours. Imaging:   CT abdomen and pelvis:  cirrhotic appearing liver with stigmata of portal hypertension consisting of splenomegaly, small amount of ascites and left upper quadrant varices. Distended gallbladder with cholelithiasis. This can be seen with prolonged fasting state or chronic cystic duct obstruction.  There is some mild wall thickening of the transverse limb of the duodenum with some adjacent retroperitoneal fat stranding. This may reflect a duodenitis. Alternatively, this appearance may be caused by the duodenum being decompressed. This could be further evaluated with endoscopy. There is some retroperitoneal fat stranding identified within the right upper quadrant and right mid abdomen. This may be related to cirrhosis and portal hypertension. Reactive edema could result in this appearance in the setting of a duodenitis. Previous endoscopy:     IMPRESSION/RECOMMENDATIONS:    49-year-old female with past medical history of alcoholic liver cirrhosis presented with abdominal pain. Abdominal pain:  -Patient mentions that she has been having diffuse abdominal pain, constant, nonradiating, sharp. Also, endorses nausea and nonbloody vomiting. Reports having diarrhea with dark stool. Pain gets worse with food. -CT scan of abdomen and pelvis showing chronic changes.  - Hepatitis panel was checked on 01/13/2022  -Bilirubin elevated on admission. Patient has history of alcoholic liver cirrhosis. -Right upper quadrant ultrasound  -AFP  - Protonix   -C. difficile is pending. Patient mentions that her diarrhea resolved. - EGD tomorrow morning. NPO after midnight. Thank you for allowing me to participate in [facility]'s care.        Patient discussed with attending, Dr. Stacey Chris MD.    Rosemary Horn MD  PGY-2

## 2022-03-30 NOTE — PROGRESS NOTES
Pt had trach care done. Site was cleaned, gauze and trach ties were changed out and replaced. Pt tolerated well.

## 2022-03-30 NOTE — PROGRESS NOTES
Clinical Pharmacy Progress Note    Vancomycin - Management by Pharmacy    Consult Date(s): 3/28  Consulting Provider(s): Dr. Breen Thomas / Plan:  1)  Sepsis 2/2 PNA, possible UTI, r/o C.diff colitis - Vancomycin   Concurrent Antimicrobials: Cefepime  o Will increase dose to 2000mg EI q8h per [address] B-Lactam Extended Infusion Policy since est CrCl is > 60 mL/min.  Day of Vanc Therapy: 3   Current Dosing Method: Bayesian-Guided AUC Dosing  o Therapeutic Goal: AUC = 400-600 mg/L*hr  o Currently on 1250mg IV q12h. Random level this AM = 12.1 - calculated AUC = 478 and trough = 13.5. Continue same. o Will plan to check repeat level in 3-4 days if pt is still on Vancomycin.  Will continue to monitor clinical condition and make adjustments to regimen as appropriate. · MRSA nasal PCR is negative - could consider d/c'ing Vancomycin if PNA is thought to be source of sepsis. Please call with questions--  Thanks--  Katelin Hammer, PharmD, BCPS, BCGP  [id number] ([facility] [place])   3/30/2022 9:01 AM      Interval update:   Tmax 101.2 last 24 hrs. Still with low grade fevers overnight. Subjective/Objective:  NITA Etienne is a 59 y.o. female with a PMHx significant for HLD, Alcoholic cirrhosis, [id number] PNA requiring trach placement (fall 2021) who is admitted with abdominal pain and sepsis of unclear origin. Pharmacy is consulted to dose vancomycin.     Ht Readings from Last 1 Encounters:   03/28/22 5' 6\" (1.676 m)     Wt Readings from Last 1 Encounters:   03/29/22 238 lb 1.6 oz (108 kg)     Current & Prior Antimicrobial Regimen(s):    x1 (3/28)   Cefepime -   2000 mg EI q12h (3/28-3/30)   2000mg EI q8h (3/30-current)   Vancomycin - Pharmacy to dose   1250 mg IV q12h (3/28-current)     Vancomycin Level(s) / Doses:    Date Time Dose Level / Type of Level Interpretation   3/29 04:34 1250mg q12h Random = 11.8 mcg/mL · Drawn 5h after first dose given  · Calculated AUC = 551 with trough = 16.7   3/30 07:11 1250mg q12h Random = 12.1 mcg/mL · Drawn ~9h after 3rd total dose  · Calculated AUC = 478 with trough = 13.5   Note: Serum levels collected for AUC-based dosing may be high if collected in close proximity to the dose administered. This is not necessarily indicative of toxicity. Cultures & Sensitivities:    Date Site Micro Susceptibility / Result   3/28 Blood x2 No growth to date    3/28 Urine Sent    3/29 MRSA nasal PCR Negative    3/29 Strep pneumo antigen ordered    3/29 Legionella antigen ordered    3/29 Rapid COVID negative    3/29 Rapid flu negative      Labs / Ancillary Data:    Estimated Creatinine Clearance: 108 mL/min (based on SCr of 0.7 mg/dL).     Recent Labs     03/28/22  1003 03/28/22  1004 03/29/22  0433 03/29/22  0434   CREATININE  --  <0.5*  --  0.7   BUN  --  9  --  11   WBC 5.9  --  8.5  --        Recent Labs     03/29/22  0434   PROCAL 0.28*

## 2022-03-30 NOTE — PROGRESS NOTES
Secure message sent to Dr Dayan Arteaga to request increase in pain medications and/or to add PO medications due to increased pain.

## 2022-03-30 NOTE — CONSULTS
Clinical Pharmacy Progress Note    Vancomycin has been discontinued. Will sign off pharmacy to dose Vancomycin consult. If medication is restarted and pharmacy is to manage dosing, please re-consult at that time.     Please call with questions--  Thanks--  Marixa Atwood, PharmD, [address], [address] ([facility])   3/30/2022 11:23 AM

## 2022-03-30 NOTE — PROGRESS NOTES
Pt pain is 10/10, IV medication pulled for Pt. Pt was getting 0.5mg IV dilaudid, however, after speaking with pt, she states that she is only getting apx 10-15 of relief. This RN told pt that we will request an order for 1 mg IV dilaudid and for now we will hold the 0.5 until we hear back from MD.  Pt is agreeable and said she is willing to wait for a little while. Due to med being opened and previously wasted 0.5, the rest of the medication was wasted in omnicell with RN.

## 2022-03-30 NOTE — PROGRESS NOTES
Patient alert and oriented x 4, VSS. Patient refused to get to chair today or ambulate to bathroom due to pain. Patient denied pain monitored and PRNs administered throughout shift. .  Patient currently in bed with bed alarm on, wheels locked and in lowest position.   Plan for discharge EGD and RUQ ultrasound in am.

## 2022-03-31 ENCOUNTER — ANESTHESIA (OUTPATIENT)
Dept: ENDOSCOPY | Age: 57
DRG: 383 | End: 2022-03-31
Payer: MEDICARE

## 2022-03-31 ENCOUNTER — APPOINTMENT (OUTPATIENT)
Dept: ULTRASOUND IMAGING | Age: 57
DRG: 383 | End: 2022-03-31
Payer: MEDICARE

## 2022-03-31 ENCOUNTER — ANESTHESIA EVENT (OUTPATIENT)
Dept: ENDOSCOPY | Age: 57
DRG: 383 | End: 2022-03-31
Payer: MEDICARE

## 2022-03-31 VITALS
RESPIRATION RATE: 26 BRPM | SYSTOLIC BLOOD PRESSURE: 118 MMHG | OXYGEN SATURATION: 99 % | DIASTOLIC BLOOD PRESSURE: 58 MMHG

## 2022-03-31 LAB
L. PNEUMOPHILA SEROGP 1 UR AG: NORMAL
PROCALCITONIN: 0.19 NG/ML (ref 0–0.15)
STREP PNEUMONIAE ANTIGEN, URINE: NORMAL

## 2022-03-31 PROCEDURE — 2580000003 HC RX 258: Performed by: INTERNAL MEDICINE

## 2022-03-31 PROCEDURE — 84145 PROCALCITONIN (PCT): CPT

## 2022-03-31 PROCEDURE — 3700000000 HC ANESTHESIA ATTENDED CARE: Performed by: INTERNAL MEDICINE

## 2022-03-31 PROCEDURE — 88305 TISSUE EXAM BY PATHOLOGIST: CPT

## 2022-03-31 PROCEDURE — 6370000000 HC RX 637 (ALT 250 FOR IP): Performed by: INTERNAL MEDICINE

## 2022-03-31 PROCEDURE — 6360000002 HC RX W HCPCS: Performed by: INTERNAL MEDICINE

## 2022-03-31 PROCEDURE — 1200000000 HC SEMI PRIVATE

## 2022-03-31 PROCEDURE — 6370000000 HC RX 637 (ALT 250 FOR IP): Performed by: STUDENT IN AN ORGANIZED HEALTH CARE EDUCATION/TRAINING PROGRAM

## 2022-03-31 PROCEDURE — 6360000002 HC RX W HCPCS: Performed by: NURSE ANESTHETIST, CERTIFIED REGISTERED

## 2022-03-31 PROCEDURE — 36415 COLL VENOUS BLD VENIPUNCTURE: CPT

## 2022-03-31 PROCEDURE — 7100000010 HC PHASE II RECOVERY - FIRST 15 MIN: Performed by: INTERNAL MEDICINE

## 2022-03-31 PROCEDURE — C9113 INJ PANTOPRAZOLE SODIUM, VIA: HCPCS | Performed by: INTERNAL MEDICINE

## 2022-03-31 PROCEDURE — 93975 VASCULAR STUDY: CPT

## 2022-03-31 PROCEDURE — 2709999900 HC NON-CHARGEABLE SUPPLY: Performed by: INTERNAL MEDICINE

## 2022-03-31 PROCEDURE — 76705 ECHO EXAM OF ABDOMEN: CPT

## 2022-03-31 PROCEDURE — 7100000011 HC PHASE II RECOVERY - ADDTL 15 MIN: Performed by: INTERNAL MEDICINE

## 2022-03-31 PROCEDURE — 3609012400 HC EGD TRANSORAL BIOPSY SINGLE/MULTIPLE: Performed by: INTERNAL MEDICINE

## 2022-03-31 PROCEDURE — 3700000001 HC ADD 15 MINUTES (ANESTHESIA): Performed by: INTERNAL MEDICINE

## 2022-03-31 PROCEDURE — 2500000003 HC RX 250 WO HCPCS: Performed by: NURSE ANESTHETIST, CERTIFIED REGISTERED

## 2022-03-31 PROCEDURE — 0DB68ZX EXCISION OF STOMACH, VIA NATURAL OR ARTIFICIAL OPENING ENDOSCOPIC, DIAGNOSTIC: ICD-10-PCS | Performed by: INTERNAL MEDICINE

## 2022-03-31 RX ORDER — GLYCOPYRROLATE 0.2 MG/ML
INJECTION INTRAMUSCULAR; INTRAVENOUS PRN
Status: DISCONTINUED | OUTPATIENT
Start: 2022-03-31 | End: 2022-03-31 | Stop reason: SDUPTHER

## 2022-03-31 RX ORDER — PROPOFOL 10 MG/ML
INJECTION, EMULSION INTRAVENOUS CONTINUOUS PRN
Status: DISCONTINUED | OUTPATIENT
Start: 2022-03-31 | End: 2022-03-31 | Stop reason: SDUPTHER

## 2022-03-31 RX ORDER — KETAMINE HYDROCHLORIDE 50 MG/ML
INJECTION, SOLUTION, CONCENTRATE INTRAMUSCULAR; INTRAVENOUS PRN
Status: DISCONTINUED | OUTPATIENT
Start: 2022-03-31 | End: 2022-03-31 | Stop reason: SDUPTHER

## 2022-03-31 RX ORDER — PROPOFOL 10 MG/ML
INJECTION, EMULSION INTRAVENOUS PRN
Status: DISCONTINUED | OUTPATIENT
Start: 2022-03-31 | End: 2022-03-31 | Stop reason: SDUPTHER

## 2022-03-31 RX ORDER — LIDOCAINE HYDROCHLORIDE 20 MG/ML
INJECTION, SOLUTION EPIDURAL; INFILTRATION; INTRACAUDAL; PERINEURAL PRN
Status: DISCONTINUED | OUTPATIENT
Start: 2022-03-31 | End: 2022-03-31 | Stop reason: SDUPTHER

## 2022-03-31 RX ADMIN — KETAMINE HYDROCHLORIDE 10 MG: 50 INJECTION, SOLUTION INTRAMUSCULAR; INTRAVENOUS at 14:51

## 2022-03-31 RX ADMIN — KETAMINE HYDROCHLORIDE 10 MG: 50 INJECTION, SOLUTION INTRAMUSCULAR; INTRAVENOUS at 14:48

## 2022-03-31 RX ADMIN — SODIUM CHLORIDE 8 MG/HR: 9 INJECTION, SOLUTION INTRAVENOUS at 16:51

## 2022-03-31 RX ADMIN — PROPOFOL 50 MG: 10 INJECTION, EMULSION INTRAVENOUS at 14:50

## 2022-03-31 RX ADMIN — PROPOFOL 100 MCG/KG/MIN: 10 INJECTION, EMULSION INTRAVENOUS at 14:54

## 2022-03-31 RX ADMIN — GUAIFENESIN SYRUP AND DEXTROMETHORPHAN 10 ML: 100; 10 SYRUP ORAL at 08:00

## 2022-03-31 RX ADMIN — PANTOPRAZOLE SODIUM 40 MG: 40 TABLET, DELAYED RELEASE ORAL at 06:01

## 2022-03-31 RX ADMIN — LIDOCAINE HYDROCHLORIDE 80 MG: 20 INJECTION, SOLUTION EPIDURAL; INFILTRATION; INTRACAUDAL; PERINEURAL at 14:47

## 2022-03-31 RX ADMIN — SODIUM CHLORIDE: 9 INJECTION, SOLUTION INTRAVENOUS at 14:35

## 2022-03-31 RX ADMIN — SODIUM CHLORIDE, PRESERVATIVE FREE 10 ML: 5 INJECTION INTRAVENOUS at 21:02

## 2022-03-31 RX ADMIN — SENNOSIDES AND DOCUSATE SODIUM 1 TABLET: 50; 8.6 TABLET ORAL at 08:00

## 2022-03-31 RX ADMIN — CEFEPIME HYDROCHLORIDE 2000 MG: 2 INJECTION, POWDER, FOR SOLUTION INTRAVENOUS at 16:49

## 2022-03-31 RX ADMIN — HYDROMORPHONE HYDROCHLORIDE 1 MG: 1 INJECTION, SOLUTION INTRAMUSCULAR; INTRAVENOUS; SUBCUTANEOUS at 11:15

## 2022-03-31 RX ADMIN — CEFEPIME HYDROCHLORIDE 2000 MG: 2 INJECTION, POWDER, FOR SOLUTION INTRAVENOUS at 07:28

## 2022-03-31 RX ADMIN — CEFEPIME HYDROCHLORIDE 2000 MG: 2 INJECTION, POWDER, FOR SOLUTION INTRAVENOUS at 23:31

## 2022-03-31 RX ADMIN — GLYCOPYRROLATE 0.1 MG: 0.2 INJECTION INTRAMUSCULAR; INTRAVENOUS at 14:47

## 2022-03-31 RX ADMIN — SENNOSIDES AND DOCUSATE SODIUM 1 TABLET: 50; 8.6 TABLET ORAL at 21:02

## 2022-03-31 RX ADMIN — PROPOFOL 50 MG: 10 INJECTION, EMULSION INTRAVENOUS at 14:48

## 2022-03-31 RX ADMIN — HYDROMORPHONE HYDROCHLORIDE 1 MG: 1 INJECTION, SOLUTION INTRAMUSCULAR; INTRAVENOUS; SUBCUTANEOUS at 16:56

## 2022-03-31 RX ADMIN — HYDROMORPHONE HYDROCHLORIDE 1 MG: 1 INJECTION, SOLUTION INTRAMUSCULAR; INTRAVENOUS; SUBCUTANEOUS at 04:41

## 2022-03-31 RX ADMIN — HYDROMORPHONE HYDROCHLORIDE 1 MG: 1 INJECTION, SOLUTION INTRAMUSCULAR; INTRAVENOUS; SUBCUTANEOUS at 21:02

## 2022-03-31 RX ADMIN — SODIUM CHLORIDE, PRESERVATIVE FREE 10 ML: 5 INJECTION INTRAVENOUS at 08:01

## 2022-03-31 RX ADMIN — GUAIFENESIN SYRUP AND DEXTROMETHORPHAN 10 ML: 100; 10 SYRUP ORAL at 21:02

## 2022-03-31 RX ADMIN — HYDROMORPHONE HYDROCHLORIDE 1 MG: 1 INJECTION, SOLUTION INTRAMUSCULAR; INTRAVENOUS; SUBCUTANEOUS at 07:32

## 2022-03-31 RX ADMIN — ENOXAPARIN SODIUM 40 MG: 100 INJECTION SUBCUTANEOUS at 08:00

## 2022-03-31 ASSESSMENT — PAIN SCALES - GENERAL
PAINLEVEL_OUTOF10: 0
PAINLEVEL_OUTOF10: 7
PAINLEVEL_OUTOF10: 5
PAINLEVEL_OUTOF10: 8
PAINLEVEL_OUTOF10: 9
PAINLEVEL_OUTOF10: 7
PAINLEVEL_OUTOF10: 8
PAINLEVEL_OUTOF10: 5
PAINLEVEL_OUTOF10: 9
PAINLEVEL_OUTOF10: 0
PAINLEVEL_OUTOF10: 0
PAINLEVEL_OUTOF10: 6
PAINLEVEL_OUTOF10: 9
PAINLEVEL_OUTOF10: 0
PAINLEVEL_OUTOF10: 9
PAINLEVEL_OUTOF10: 8

## 2022-03-31 ASSESSMENT — PAIN DESCRIPTION - PROGRESSION
CLINICAL_PROGRESSION: NOT CHANGED

## 2022-03-31 ASSESSMENT — PAIN DESCRIPTION - ORIENTATION
ORIENTATION: RIGHT;LEFT
ORIENTATION: RIGHT;LEFT;UPPER;LOWER
ORIENTATION: INNER
ORIENTATION: RIGHT;LEFT

## 2022-03-31 ASSESSMENT — PAIN DESCRIPTION - ONSET
ONSET: ON-GOING
ONSET: AWAKENED FROM SLEEP
ONSET: ON-GOING

## 2022-03-31 ASSESSMENT — PAIN DESCRIPTION - FREQUENCY
FREQUENCY: CONTINUOUS

## 2022-03-31 ASSESSMENT — PAIN DESCRIPTION - LOCATION
LOCATION: ABDOMEN;BACK
LOCATION: ABDOMEN;BACK
LOCATION: ABDOMEN
LOCATION: ABDOMEN
LOCATION: ABDOMEN;BACK
LOCATION: ABDOMEN
LOCATION: ABDOMEN;BACK
LOCATION: ABDOMEN

## 2022-03-31 ASSESSMENT — PAIN DESCRIPTION - DESCRIPTORS
DESCRIPTORS: BURNING
DESCRIPTORS: SHARP;CONSTANT
DESCRIPTORS: CRAMPING
DESCRIPTORS: ACHING;CONSTANT;CRAMPING;DISCOMFORT
DESCRIPTORS: ACHING;CONSTANT;DISCOMFORT;CRAMPING
DESCRIPTORS: CRAMPING;CONSTANT
DESCRIPTORS: SHARP;CONSTANT
DESCRIPTORS: ACHING;CONSTANT;CRAMPING;DISCOMFORT

## 2022-03-31 ASSESSMENT — PULMONARY FUNCTION TESTS
PIF_VALUE: 5
PIF_VALUE: 3
PIF_VALUE: 1
PIF_VALUE: 3
PIF_VALUE: 5
PIF_VALUE: 1
PIF_VALUE: 3
PIF_VALUE: 3
PIF_VALUE: 5
PIF_VALUE: 1
PIF_VALUE: 3
PIF_VALUE: 1
PIF_VALUE: 1
PIF_VALUE: 3
PIF_VALUE: 1
PIF_VALUE: 4
PIF_VALUE: 3
PIF_VALUE: 2
PIF_VALUE: 13
PIF_VALUE: 2
PIF_VALUE: 3
PIF_VALUE: 3
PIF_VALUE: 1
PIF_VALUE: 6
PIF_VALUE: 3
PIF_VALUE: 8
PIF_VALUE: 3
PIF_VALUE: 1
PIF_VALUE: 3

## 2022-03-31 ASSESSMENT — PAIN DESCRIPTION - PAIN TYPE
TYPE: ACUTE PAIN

## 2022-03-31 ASSESSMENT — PAIN - FUNCTIONAL ASSESSMENT
PAIN_FUNCTIONAL_ASSESSMENT: ACTIVITIES ARE NOT PREVENTED
PAIN_FUNCTIONAL_ASSESSMENT: PREVENTS OR INTERFERES SOME ACTIVE ACTIVITIES AND ADLS
PAIN_FUNCTIONAL_ASSESSMENT: ACTIVITIES ARE NOT PREVENTED
PAIN_FUNCTIONAL_ASSESSMENT: PREVENTS OR INTERFERES WITH MANY ACTIVE NOT PASSIVE ACTIVITIES
PAIN_FUNCTIONAL_ASSESSMENT: ACTIVITIES ARE NOT PREVENTED
PAIN_FUNCTIONAL_ASSESSMENT: 0-10
PAIN_FUNCTIONAL_ASSESSMENT: PREVENTS OR INTERFERES SOME ACTIVE ACTIVITIES AND ADLS
PAIN_FUNCTIONAL_ASSESSMENT: ACTIVITIES ARE NOT PREVENTED

## 2022-03-31 NOTE — ANESTHESIA PRE PROCEDURE
Department of Anesthesiology  Preprocedure Note       Name:  Sherri Reeves   Age:  59 y.o.  :  1965                                          MRN:  [id number]         Date:  3/31/2022      Surgeon: Saurabh Morrison):  Rajesh Hamilton MD    Procedure: Procedure(s):  EGD ESOPHAGOGASTRODUODENOSCOPY    Medications prior to admission:   Prior to Admission medications    Not on File       Current medications:    Current Facility-Administered Medications   Medication Dose Route Frequency Provider Last Rate Last Admin    cefepime (MAXIPIME) 2000 mg IVPB minibag  2,000 mg IntraVENous Q8H Nico Corbin MD   Stopped at 22 1203    pantoprazole (PROTONIX) tablet 40 mg  40 mg Oral QAM AC Tk Loo MD   40 mg at 22 0601    HYDROmorphone (DILAUDID) injection 1 mg  1 mg IntraVENous Q3H PRN Nico Corbin MD   1 mg at 22 1115    ipratropium-albuterol (DUONEB) nebulizer solution 1 ampule  1 ampule Inhalation Q4H PRN Nico Corbin MD        guaiFENesin-dextromethorphan (ROBITUSSIN DM) 100-10 MG/5ML syrup 10 mL  10 mL Oral Q6H PRN Vladislav Marquis MD   10 mL at 22 0800    sodium chloride flush 0.9 % injection 10 mL  10 mL IntraVENous 2 times per day Nico Corbin MD   10 mL at 22 0801    sodium chloride flush 0.9 % injection 10 mL  10 mL IntraVENous PRN Nico Corbin MD        0.9 % sodium chloride infusion   IntraVENous PRN Nico Corbin MD   Stopped at 22 1206    promethazine (PHENERGAN) tablet 12.5 mg  12.5 mg Oral Q6H PRN Nico Corbin MD   12.5 mg at 227    Or    ondansetron (ZOFRAN) injection 4 mg  4 mg IntraVENous Q6H PRN Nico Corbin MD   4 mg at 22 1515    sennosides-docusate sodium (SENOKOT-S) 8.6-50 MG tablet 1 tablet  1 tablet Oral BID Nico Corbin MD   1 tablet at 22 0800    polyethylene glycol (GLYCOLAX) packet 17 g  17 g Oral Daily PRN Nico Corbin MD        acetaminophen (TYLENOL) tablet 650 mg  650 mg Oral Q6H PRN Clarissa Asp Gera Treviño MD   650 mg at 03/29/22 2009    Or    acetaminophen (TYLENOL) suppository 650 mg  650 mg Rectal Q6H PRN Nora Oconnor MD        enoxaparin (LOVENOX) injection 40 mg  40 mg SubCUTAneous Daily Nora Oconnor MD   40 mg at 03/31/22 0800    sodium chloride flush 0.9 % injection 5-40 mL  5-40 mL IntraVENous 2 times per day Nora Oconnor MD   10 mL at 03/30/22 2039    sodium chloride flush 0.9 % injection 5-40 mL  5-40 mL IntraVENous PRN Nora Oconnor MD   10 mL at 03/30/22 1018    0.9 % sodium chloride infusion   IntraVENous PRN Nora Oconnor MD 5 mL/hr at 03/30/22 1525 New Bag at 03/30/22 1525       Allergies:  No Known Allergies    Problem List:    Patient Active Problem List   Diagnosis Code    Intractable abdominal pain R10.9       Past Medical History:        Diagnosis Date    Hyperlipidemia     Tr[facility] in place [facility])        Past Surgical History:  History reviewed. No pertinent surgical history. Social History:    Social History     Tobacco Use    Smoking status: Former Smoker    Smokeless tobacco: Never Used   Substance Use Topics    Alcohol use: Not Currently                                Counseling given: Not Answered      Vital Signs (Current):   Vitals:    03/31/22 0346 03/31/22 0600 03/31/22 0718 03/31/22 1109   BP: 113/82  121/61 138/61   Pulse: 76  84 80   Resp: 18  18 18   Temp: 97.9 °F (36.6 °C)  98 °F (36.7 °C) 98.9 °F (37.2 °C)   TempSrc: Oral  Oral Oral   SpO2: 96%   93%   Weight:  239 lb 13.8 oz (108.8 kg)     Height:                                                  BP Readings from Last 3 Encounters:   03/31/22 138/61       NPO Status:                                                                                 BMI:   Wt Readings from Last 3 Encounters:   03/31/22 239 lb 13.8 oz (108.8 kg)     Body mass index is 38.71 kg/m².     CBC:   Lab Results   Component Value Date    WBC 8.5 03/29/2022    RBC 3.63 03/29/2022    HGB 11.0 03/29/2022    HCT 32.0 03/29/2022    MCV 88.1 03/29/2022    RDW 15.5 03/29/2022    PLT 90 03/29/2022       CMP:   Lab Results   Component Value Date     03/29/2022    K 3.8 03/29/2022     03/29/2022    CO2 20 03/29/2022    BUN 11 03/29/2022    CREATININE 0.7 03/29/2022    GFRAA >60 03/29/2022    AGRATIO 0.7 03/28/2022    LABGLOM >60 03/29/2022    GLUCOSE 83 03/29/2022    PROT 7.5 03/28/2022    CALCIUM 8.4 03/29/2022    BILITOT 2.3 03/28/2022    ALKPHOS 170 03/28/2022    AST 35 03/28/2022    ALT 16 03/28/2022       POC Tests: No results for input(s): POCGLU, POCNA, POCK, POCCL, POCBUN, POCHEMO, POCHCT in the last 72 hours. Coags: No results found for: PROTIME, INR, APTT    HCG (If Applicable): No results found for: PREGTESTUR, PREGSERUM, HCG, HCGQUANT     ABGs:   Lab Results   Component Value Date    PHART 7.470 03/28/2022    PO2ART 70.7 03/28/2022    LPI2CRA 28.3 03/28/2022    EUJ4MKD 21 03/28/2022    BEART -2.1 03/28/2022    L2DQBSAX 95 03/28/2022        Type & Screen (If Applicable):  No results found for: LABABO, LABRH    Drug/Infectious Status (If Applicable):  No results found for: HIV, HEPCAB    COVID-19 Screening (If Applicable):   Lab Results   Component Value Date    COVID19 Not Detected 03/29/2022           Anesthesia Evaluation  Patient summary reviewed and Nursing notes reviewed no history of anesthetic complications:   Airway: Mallampati: III  TM distance: >3 FB   Neck ROM: full  Mouth opening: > = 3 FB Dental: normal exam         Pulmonary:   (+) rhonchi,  decreased breath sounds,                             Cardiovascular:  Exercise tolerance: good (>4 METS),       (-)  angina, orthopnea and PND      Rhythm: regular  Rate: normal           Beta Blocker:  Not on Beta Blocker         Neuro/Psych:               GI/Hepatic/Renal:   (+) liver disease: portal hypertension, esophageal varices, morbid obesity          Endo/Other:                     Abdominal:   (+) obese,     Abdomen: tender. Vascular:           Other Findings: Anesthesia Plan      general     ASA 3     (Patient has Hx of cirrhosis, portal HTN with varices, given her recent bout of nausea and vomiting would prefer airway secure prior to endoscopy to reduce the risk of aspiration should vomiting or varices rupture)  Induction: intravenous. MIPS: Prophylactic antiemetics administered. Anesthetic plan and risks discussed with patient. Plan discussed with CRNA and attending.                   Joaquin Bolaños DO   3/31/2022

## 2022-03-31 NOTE — PROGRESS NOTES
Hospitalist Progress Note      PCP: No primary care provider on file. Date of Admission: 3/28/2022    Chief Complaint:     Chief Complaint   Patient presents with    Abdominal Pain    Emesis    Diarrhea       Subjective:  Patient seen and examined at the bedside. No complaints at this time.   No acute events overnight  Continues to endorse 10/10, pain  Pain today also includes lower back and is worse with urination  Pressure GI input, EGD and right upper quadrant ultrasound today  Continue analgesia as needed  Now on 1 L nasal cannula, T-max 100.3 overnight    PFHS: reviewed as documented 3/28/2022, no changes unless noted above    Medications:  Reviewed    Infusion Medications    sodium chloride Stopped (03/30/22 1206)    sodium chloride 5 mL/hr at 03/30/22 1525     Scheduled Medications    cefepime  2,000 mg IntraVENous Q8H    pantoprazole  40 mg Oral QAM AC    sodium chloride flush  10 mL IntraVENous 2 times per day    sennosides-docusate sodium  1 tablet Oral BID    enoxaparin  40 mg SubCUTAneous Daily    sodium chloride flush  5-40 mL IntraVENous 2 times per day     PRN Meds: [DISCONTINUED] HYDROmorphone **OR** HYDROmorphone, ipratropium-albuterol, guaiFENesin-dextromethorphan, sodium chloride flush, sodium chloride, promethazine **OR** ondansetron, polyethylene glycol, acetaminophen **OR** acetaminophen, sodium chloride flush, sodium chloride      Intake/Output Summary (Last 24 hours) at 3/31/2022 1000  Last data filed at 3/31/2022 0353  Gross per 24 hour   Intake 1160.37 ml   Output 1250 ml   Net -89.63 ml       Physical Exam    /61   Pulse 84   Temp 98 °F (36.7 °C) (Oral)   Resp 18   Ht 5' 6\" (1.676 m)   Wt 239 lb 13.8 oz (108.8 kg)   SpO2 96%   BMI 38.71 kg/m²     General appearance:  No acute distress, appears stated age  Eyes: Pupils equal, round, reactive to light, conjunctiva/corneas clear  Ears/Nose/Mouth/Throat: No external lesions or scars, hearing intact to voice  Neck: Trachea midline, no masses noted, no thyromegaly  Respiratory:  Non-labored breathing, clear to auscultation bilaterally  Cardiovascular: Regular rate and rhythm, no murmurs, gallops, or rubs  Abdomen: soft, diffusely tender to palpation  Musculoskeletal: Warm, well perfused, no cyanosis or edema  Skin: normal color, no wounds noted  Psychiatric: A&Ox4, good insight and judgment    Labs:   Recent Labs     03/28/22  1003 03/29/22  0433   WBC 5.9 8.5   HGB 12.5 11.0*   HCT 37.5 32.0*   * 90*     Recent Labs     03/28/22  1004 03/29/22  0434   * 133*   K 4.4 3.8    102   CO2 21 20*   BUN 9 11   CREATININE <0.5* 0.7   CALCIUM 9.5 8.4     Recent Labs     03/28/22  1004   AST 35   ALT 16   BILITOT 2.3*   ALKPHOS 170*     No results for input(s): INR in the last 72 hours. Recent Labs     03/28/22  1004 03/28/22  2251   CKTOTAL  --  35   TROPONINI <0.01  --        Urinalysis:      Lab Results   Component Value Date    NITRU POSITIVE 03/28/2022    WBCUA 6-9 03/28/2022    BACTERIA 2+ 03/28/2022    RBCUA 5-10 03/28/2022    BLOODU TRACE 03/28/2022    SPECGRAV 1.015 03/28/2022    GLUCOSEU Negative 03/28/2022       Radiology:  XR CHEST PORTABLE   Final Result      Diffuse bilateral airspace disease, greater on the left. CT ABDOMEN PELVIS W IV CONTRAST Additional Contrast? None   Final Result   1. Cirrhotic appearing liver with stigmata of portal hypertension consisting of splenomegaly, small amount of ascites and left upper quadrant varices. 2. Distended gallbladder with cholelithiasis. This can be seen with prolonged fasting state or chronic cystic duct obstruction. 3. There is some mild wall thickening of the transverse limb of the duodenum with some adjacent retroperitoneal fat stranding. This may reflect a duodenitis. Alternatively, this appearance may be caused by the duodenum being decompressed. This could be    further evaluated with endoscopy.    4. There is some retroperitoneal fat stranding identified within the right upper quadrant and right mid abdomen. This may be related to cirrhosis and portal hypertension. Reactive edema could result in this appearance in the setting of a duodenitis. US ABDOMEN LIMITED Specify organ?  LIVER, GALLBLADDER    (Results Pending)       Assessment/Plan:    Active Hospital Problems    Diagnosis Date Noted    Intractable abdominal pain [R10.9] 03/28/2022       Plan:    #Intractable abdominal pain  CT abdomen/pelvis as above, no acute findings  No clear etiology has been identified  She has been requiring Dilaudid every 3 hours  Appreciate GI assistance, EGD and right upper quadrant ultrasound today    #Sepsis  Initially on ceftriaxone for presumed urinary etiology  Pro-Jignesh found to be 0.28 and her shortness of breath advised broadened to vancomycin and cefepime  MRSA nares -3/30, vancomycin stopped  Blood cultures no growth to date, Legionella and pneumococcal negative, flu negative, Covid negative  Urine culture growing Klebsiella and Proteus, both sensitive to cefepime  Continue cefepime, today is day 4    #Hypomagnesemia  Repleted, follow    #Remainder of chronic conditions  Home management except as above    DVT Prophylaxis: Lovenox  Diet: Diet NPO Exceptions are: Sips of Water with Meds  Code Status: Full Code    PT/OT Eval Status: Ongoing    Dispo: Odilia Mcduffie pending clinical improvement    Bethanie Sheikh MD

## 2022-03-31 NOTE — PROGRESS NOTES
Pt pain continues to occur. Pt given IV PRN to help, pt does have short term relief. Pt states the majority of her pain is lower back , but also having increasing Abd pain (referred pain?). Pt also made a note to mention she has increased pain with urination.

## 2022-03-31 NOTE — OP NOTE
EGD REPORT    Patient:  NITA Etienne                  1965    Referring Physician:  Eva Alex    Endoscopist: Jessika Garvey     Indication:  Intractable abdominal pain. Cirrhosis. Medications:  MAC per anesthesia record      Pre-Anesthesia Assessment:  I have reviewed and am in agreement with patient history and medication, including previous response to sedation. Prior to the procedure, a History and Physical was performed, and patient medications and allergies were reviewed. The patient is competent. The risks and benefits of the procedure and the sedation options and risks were discussed with the patient. Risks discussed included but were not limited to infection, bleeding, perforation, death, and missed lesions. All questions were answered and informed consent was obtained. Patient identification and proposed procedure were verified by the physician and the nurse in the pre-procedure area in the procedure room. Mallampatti: III  ASA Grade Assessment: 3     After reviewing the risks and benefits, the patient was deemed in satisfactory condition to undergo the procedure. The anesthesia plan was to use MAC anesthesia. Immediately prior to administration of medications, the patient was re-assessed for adequacy to receive sedatives and a time out was performed. Patient and healthcare providers were in agreement it was the correct patient and procedure. The heart rate, respiratory rate, oxygen saturations, blood pressure, adequacy of pulmonary ventilation, and response to care were monitored throughout the procedure. The physical status of the patient was re-assessed after the procedure. After obtaining informed consent, the endoscope was passed under direct vision. The endoscope was introduced through the mouth, and advanced to the second part of duodenum. The EGD was accomplished without difficulty. The patient tolerated the procedure well.        Duodenum: several large but shallow clean based duodenal ulcers with surrounding edema  Stomach: three clean based antral ulcers - biopsied with easy bleeding and portal gastropathy. Retroflexion did not show gastric varices. Esophagus: GE junction at 35cm.   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

## 2022-03-31 NOTE — PLAN OF CARE
Patient off of dialysis early, only completed 1/2 of her treatment. No further chest pain. Respirations easy. Pulse ox in the upper 90's on room air. Oriented to room. Call light in reach.

## 2022-03-31 NOTE — PLAN OF CARE
Problem: Pain:  Goal: Control of acute pain  Description: Control of acute pain  3/31/2022 1426 by Denice Humphreys RN  Note: Patient still complaining of constant chest pain . She describes pain as sharp. Medicated with dilaudid x 2. Patient sleeps most of the time. Problem: Skin Integrity:  Goal: Absence of new skin breakdown  Description: Absence of new skin breakdown  3/31/2022 1426 by Denice Humphreys RN  Note: No breakdown noted. She turns and repositions on her own. Problem: Falls - Risk of:  Goal: Will remain free from falls  Description: Will remain free from falls  3/31/2022 1426 by Denice Humphreys RN  Note: She is up ad jonh in the room. Gait steady. No alarm needed.

## 2022-03-31 NOTE — CARE COORDINATION
Case Management Assessment           Daily Note                 Date/ Time of Note: 3/31/2022 10:12 AM         Note completed by: Marlin Hussein RN    Patient Name: Sara Conn  YOB: 1965    Diagnosis:Left flank pain [R10.9]  Nausea vomiting and diarrhea [R11.2, R19.7]  Abdominal pain, unspecified abdominal location [R10.9]  Urinary tract infection without hematuria, site unspecified [N39.0]  Intractable abdominal pain [R10.9]  Patient Admission Status: Inpatient    Date of Admission:3/28/2022  9:37 AM Length of Stay: 3 GLOS: GMLOS: 3.5    Current Plan of Care: To SNF (lives alone at home)   ________________________________________________________________________________________  PT AM-PAC: 16 / 25 per last evaluation on: 3-29    OT AM-PAC: 12 / 24 per last evaluation on: 3-29    DME Needs for discharge: TBD  ________________________________________________________________________________________  Discharge Plan: SNF: Referrals out    Tentative discharge date: 4/1    Current barriers to discharge: medical complications    Referrals completed: SNF: see below    Resources/ information provided: SNF List  ________________________________________________________________________________________  Case Management Notes:  CDIFF cancelled d/t no stool. Patient does not have any specific choices but would be agreeable to a facility near her apartment which is in [place]. Therapy evals support nned for SNF level of care. Will plan to identify facilities in [place] area and make referrals tomorrow. Sent out referrals:   [address] [facility] [address] [phone]  [facility] [address] [facility] [address]  [phone]  [facility] [address] [facility]  [phone]    [address] and her family were provided with choice of provider; she and her family are in agreement with the discharge plan.     Care Transition Patient: Maddie Wu [place], RN  [facility]  Case Management Department  Ph: [phone]

## 2022-03-31 NOTE — ANESTHESIA POSTPROCEDURE EVALUATION
Department of Anesthesiology  Postprocedure Note    Patient: Mateo Chavira  MRN: [id number]  YOB: 1965  Date of evaluation: 3/31/2022  Time:  6:14 PM     Procedure Summary     Date: 03/31/22 Room / Location: [address] [place] [address] Richar Monsivais  / [facility]    Anesthesia Start: 2743 Anesthesia Stop: 9421    Procedure: EGD BIOPSY (N/A ) Diagnosis: (ABD PAIN, ABNL CT SCAN)    Surgeons: Laverne Smith MD Responsible Provider: Chin Schmid DO    Anesthesia Type: general ASA Status: 3          Anesthesia Type: general    Jimy Phase I: Jimy Score: 9    Jimy Phase II: Jimy Score: 9    Last vitals: Reviewed and per EMR flowsheets.        Anesthesia Post Evaluation    Patient location during evaluation: PACU  Patient participation: complete - patient participated  Level of consciousness: awake and alert  Airway patency: patent  Nausea & Vomiting: no nausea and no vomiting  Cardiovascular status: blood pressure returned to baseline  Respiratory status: acceptable  Hydration status: euvolemic

## 2022-03-31 NOTE — PROGRESS NOTES
GI Progress Note      NITA Etienne is a 59 y.o. female patient. 1. Abdominal pain, unspecified abdominal location    2. Nausea vomiting and diarrhea    3. Left flank pain    4. Urinary tract infection without hematuria, site unspecified        Admit Date: 3/28/2022    Subjective:     Patient was seen and examined this morning. No acute event overnight. Patient mentions that she continues to have abdominal pain, diffuse, sharp, and non radiating. Mentions that pain medication has helped with her abdominal pain. Mentions that she has nausea, but no vomiting. She denies having. Reports that her diarrhea has resolved since hospitalization and her last BM was 3 days ago. Denies having SOB, palpitation, chest pain, cough.      ROS:  As per above    Scheduled Meds:   cefepime  2,000 mg IntraVENous Q8H    pantoprazole  40 mg Oral QAM AC    sodium chloride flush  10 mL IntraVENous 2 times per day    sennosides-docusate sodium  1 tablet Oral BID    enoxaparin  40 mg SubCUTAneous Daily    sodium chloride flush  5-40 mL IntraVENous 2 times per day       Continuous Infusions:   sodium chloride Stopped (03/30/22 1206)    sodium chloride 5 mL/hr at 03/30/22 1525       PRN Meds:  [DISCONTINUED] HYDROmorphone **OR** HYDROmorphone, ipratropium-albuterol, guaiFENesin-dextromethorphan, sodium chloride flush, sodium chloride, promethazine **OR** ondansetron, polyethylene glycol, acetaminophen **OR** acetaminophen, sodium chloride flush, sodium chloride      Objective:       Patient Vitals for the past 24 hrs:   BP Temp Temp src Pulse Resp SpO2 Weight   03/31/22 0718 121/61 98 °F (36.7 °C) Oral 84 18 -- --   03/31/22 0600 -- -- -- -- -- -- 239 lb 13.8 oz (108.8 kg)   03/31/22 0346 113/82 97.9 °F (36.6 °C) Oral 76 18 96 % --   03/30/22 2300 112/62 99.5 °F (37.5 °C) Oral 85 16 92 % --   03/30/22 2258 -- -- -- -- -- (!) 84 % --   03/30/22 1948 132/64 98.9 °F (37.2 °C) Oral 87 18 92 % --   03/30/22 1642 -- -- -- -- 20 93 % -- 22 1640 (!) 140/56 100.3 °F (37.9 °C) Oral 84 20 93 % --   22 1222 -- -- -- -- -- 92 % --   22 1208 137/68 99.8 °F (37.7 °C) Oral 80 21 98 % --       Exam:  VITALS:  /61   Pulse 84   Temp 98 °F (36.7 °C) (Oral)   Resp 18   Ht 5' 6\" (1.676 m)   Wt 239 lb 13.8 oz (108.8 kg)   SpO2 96%   BMI 38.71 kg/m²   TEMPERATURE:  Current - Temp: 98 °F (36.7 °C); Max - Temp  Av.1 °F (37.3 °C)  Min: 97.9 °F (36.6 °C)  Max: 100.3 °F (37.9 °C)      General appearance: alert, appears stated age, cooperative and no distress  Head: Normocephalic, without obvious abnormality, atraumatic  Neck: supple, symmetrical, trachea midline and thyroid not enlarged, symmetric, no tenderness/mass/nodules  CVS:  RRR, Nl s1s2  Lungs CTA Bilaterally, normal effort  Abdomen: soft, non-tender; bowel sounds normal; no masses,  no organomegaly  AAOx3, No asterixis or encephalopathy  Extremities: No edema. Recent Labs     22  1003 22  0433   WBC 5.9 8.5   HGB 12.5 11.0*   HCT 37.5 32.0*   MCV 89.0 88.1   * 90*     Recent Labs     22  1004 22  0434   * 133*   K 4.4 3.8    102   CO2 21 20*   BUN 9 11   CREATININE <0.5* 0.7     Recent Labs     22  1004   AST 35   ALT 16   BILITOT 2.3*   ALKPHOS 170*     Recent Labs     22  1004   LIPASE 26.0     Recent Labs     22  1004   PROT 7.5     No results for input(s): PTT in the last 72 hours. No results for input(s): OCCULTBLD in the last 72 hours. Radiology review:   Pending liver ultrasound with doppler    Assessment:       Abdominal pain:  Alcoholic cirrhosis:  Nausea:      Recommendations:     1. EGD this afternoon  2. Right upper quadrant ultrasound with doppler showed cirrhosis, vasculature patent, cholelithiasis without evidence of acute cholecystitis. 3. AFP pending  4. Continue Protonix  5.  No significant ascites to tap      Patient discussed with attending, Dr. Jaquelin Ryan MD.    Dominique Mitchell, MD  PGY-2  3/31/2022  9:46 AM

## 2022-03-31 NOTE — PROGRESS NOTES
Pt AOx4, VSS. Pt NPO since midnight for EGD today. Pt c/o 8-10/10 pain during shift, PRNs administered with relief.  Pt in bed with bed alarm on, low bed, call light with in reach

## 2022-03-31 NOTE — PROGRESS NOTES
Pt states still having pain, PRN med pulled and went to administer, however, pt was taken to ultrasound prior to RN arrival back to room, due to seal being broke and undetermined time for pt return, med wasted in omnicell. Will re-evaluate Pt pain when she arrives back to floor. Pt may benefit from having PO medications added to regime.

## 2022-03-31 NOTE — CARE COORDINATION
[facility] unable to take because they do not take trachs. Electronically signed by Nancy Schroeder RN on 3/31/2022 at 1:51 PM       Addendum:   Attempted to see patient. Patient in Endoscopy at this time. Electronically signed by Nancy Schroeder RN on 3/31/2022 at 2:47 PM     Addendum:   sent referral to [facility].  Electronically signed by Nancy Schroeder RN on 3/31/2022 at 6:43 PM

## 2022-03-31 NOTE — H&P
Pre-operative History and Physical    Patient: NITA Etienne  : 1965     History Obtained From:  patient    HISTORY OF PRESENT ILLNESS:    The patient is a 59 y.o. female who presents for an EGD for upper abdominal pain and abnormal duodenum on CT. Past Medical History:        Diagnosis Date    Hyperlipidemia     Tracheostomy in place [facility])      Past Surgical History:    History reviewed. No pertinent surgical history. Medications Prior to Admission:   No current facility-administered medications on file prior to encounter. No current outpatient medications on file prior to encounter. Allergies:  Patient has no known allergies. History of allergic reaction to anesthesia:  No    Social History: Former smoker  Not currently drinking    Family History:   History reviewed. No pertinent family history. PHYSICAL EXAM:      /61   Pulse 80   Temp 98.9 °F (37.2 °C) (Oral)   Resp 18   Ht 5' 6\" (1.676 m)   Wt 239 lb 13.8 oz (108.8 kg)   SpO2 93%   BMI 38.71 kg/m²  I        Heart:  No m/r/g +s1/s2 RRR    Lungs:  C(+) rhonchi, patient has a trach    Abdomen:  Soft, nontender, non distended; +bs, obese    ASA Grade:  ASA 3 - Patient with moderate systemic disease with functional limitations    Mallampati Class:  Class I: Soft palate, uvula, fauces, pillars visible  __________  Class II: Soft palate, uvula, fauces visible  __________   Class III: Soft palate, base of uvula visible  ______X____  Class IV: Hard palate only visible   __________      ASSESSMENT AND PLAN:    1. Patient is a 59 y.o. female here for EGD with anesthesia  2. Procedure options, risks and benefits reviewed with patient. We specifically discussed that risks include, but are not limited to infection, bleeding, perforation, death, and missed lesions. Patient expresses understanding.

## 2022-03-31 NOTE — CARE COORDINATION
Spoke to patient regarding discharge placement referrals so far. Patient stated understanding with trying to find a placement for patients with trachs. CM will continue to follow patient until discharge.   Electronically signed by Nasim Reid RN on 3/31/2022 at 7:01 PM

## 2022-04-01 ENCOUNTER — APPOINTMENT (OUTPATIENT)
Dept: GENERAL RADIOLOGY | Age: 57
DRG: 383 | End: 2022-04-01
Payer: MEDICARE

## 2022-04-01 LAB
AFP: 1.4 UG/L
ANION GAP SERPL CALCULATED.3IONS-SCNC: 7 MMOL/L (ref 3–16)
BASOPHILS ABSOLUTE: 0 K/UL (ref 0–0.2)
BASOPHILS RELATIVE PERCENT: 0.4 %
BUN BLDV-MCNC: 6 MG/DL (ref 7–20)
CALCIUM SERPL-MCNC: 8.8 MG/DL (ref 8.3–10.6)
CHLORIDE BLD-SCNC: 103 MMOL/L (ref 99–110)
CO2: 25 MMOL/L (ref 21–32)
CREAT SERPL-MCNC: 0.6 MG/DL (ref 0.6–1.1)
EOSINOPHILS ABSOLUTE: 0.3 K/UL (ref 0–0.6)
EOSINOPHILS RELATIVE PERCENT: 5.3 %
GFR AFRICAN AMERICAN: >60
GFR NON-AFRICAN AMERICAN: >60
GLUCOSE BLD-MCNC: 83 MG/DL (ref 70–99)
HCT VFR BLD CALC: 30.7 % (ref 36–48)
HEMOGLOBIN: 10.5 G/DL (ref 12–16)
LYMPHOCYTES ABSOLUTE: 1.3 K/UL (ref 1–5.1)
LYMPHOCYTES RELATIVE PERCENT: 26.2 %
MCH RBC QN AUTO: 30 PG (ref 26–34)
MCHC RBC AUTO-ENTMCNC: 34.2 G/DL (ref 31–36)
MCV RBC AUTO: 87.7 FL (ref 80–100)
MONOCYTES ABSOLUTE: 0.5 K/UL (ref 0–1.3)
MONOCYTES RELATIVE PERCENT: 10.5 %
NEUTROPHILS ABSOLUTE: 2.9 K/UL (ref 1.7–7.7)
NEUTROPHILS RELATIVE PERCENT: 57.6 %
PDW BLD-RTO: 15.3 % (ref 12.4–15.4)
PLATELET # BLD: 106 K/UL (ref 135–450)
PMV BLD AUTO: 7.6 FL (ref 5–10.5)
POTASSIUM REFLEX MAGNESIUM: 4 MMOL/L (ref 3.5–5.1)
RBC # BLD: 3.5 M/UL (ref 4–5.2)
SODIUM BLD-SCNC: 135 MMOL/L (ref 136–145)
WBC # BLD: 5 K/UL (ref 4–11)

## 2022-04-01 PROCEDURE — 74018 RADEX ABDOMEN 1 VIEW: CPT

## 2022-04-01 PROCEDURE — 36415 COLL VENOUS BLD VENIPUNCTURE: CPT

## 2022-04-01 PROCEDURE — 97530 THERAPEUTIC ACTIVITIES: CPT

## 2022-04-01 PROCEDURE — 6370000000 HC RX 637 (ALT 250 FOR IP): Performed by: INTERNAL MEDICINE

## 2022-04-01 PROCEDURE — 80048 BASIC METABOLIC PNL TOTAL CA: CPT

## 2022-04-01 PROCEDURE — 2580000003 HC RX 258: Performed by: INTERNAL MEDICINE

## 2022-04-01 PROCEDURE — 6360000002 HC RX W HCPCS: Performed by: INTERNAL MEDICINE

## 2022-04-01 PROCEDURE — 97535 SELF CARE MNGMENT TRAINING: CPT

## 2022-04-01 PROCEDURE — C9113 INJ PANTOPRAZOLE SODIUM, VIA: HCPCS | Performed by: INTERNAL MEDICINE

## 2022-04-01 PROCEDURE — 85025 COMPLETE CBC W/AUTO DIFF WBC: CPT

## 2022-04-01 PROCEDURE — 1200000000 HC SEMI PRIVATE

## 2022-04-01 RX ORDER — IPRATROPIUM BROMIDE AND ALBUTEROL SULFATE 2.5; .5 MG/3ML; MG/3ML
1 SOLUTION RESPIRATORY (INHALATION) EVERY 4 HOURS PRN
COMMUNITY

## 2022-04-01 RX ORDER — CALCIUM CARBONATE 200(500)MG
1 TABLET,CHEWABLE ORAL 3 TIMES DAILY PRN
Status: ON HOLD | COMMUNITY
End: 2022-04-04 | Stop reason: HOSPADM

## 2022-04-01 RX ORDER — CETIRIZINE HYDROCHLORIDE 10 MG/1
10 TABLET ORAL DAILY
Status: ON HOLD | COMMUNITY
End: 2022-05-06

## 2022-04-01 RX ORDER — FLUTICASONE PROPIONATE 50 MCG
2 SPRAY, SUSPENSION (ML) NASAL DAILY
COMMUNITY

## 2022-04-01 RX ORDER — ALBUTEROL SULFATE 2.5 MG/3ML
2.5 SOLUTION RESPIRATORY (INHALATION) 4 TIMES DAILY
COMMUNITY

## 2022-04-01 RX ORDER — GABAPENTIN 400 MG/1
400 CAPSULE ORAL 3 TIMES DAILY
Status: ON HOLD | COMMUNITY
End: 2022-04-04 | Stop reason: SDUPTHER

## 2022-04-01 RX ORDER — LIDOCAINE 50 MG/G
1 PATCH TOPICAL DAILY
COMMUNITY

## 2022-04-01 RX ORDER — METFORMIN HYDROCHLORIDE 500 MG/1
1000 TABLET, EXTENDED RELEASE ORAL 2 TIMES DAILY
Status: ON HOLD | COMMUNITY
End: 2022-05-06

## 2022-04-01 RX ORDER — BENZONATATE 100 MG/1
100 CAPSULE ORAL 3 TIMES DAILY PRN
Status: ON HOLD | COMMUNITY
End: 2022-05-10 | Stop reason: HOSPADM

## 2022-04-01 RX ORDER — NALOXONE HYDROCHLORIDE 4 MG/.1ML
1 SPRAY NASAL PRN
COMMUNITY

## 2022-04-01 RX ORDER — ACETAMINOPHEN 325 MG/1
650 TABLET ORAL EVERY 4 HOURS PRN
Status: ON HOLD | COMMUNITY
End: 2022-05-10 | Stop reason: HOSPADM

## 2022-04-01 RX ORDER — LANOLIN ALCOHOL/MO/W.PET/CERES
3 CREAM (GRAM) TOPICAL NIGHTLY
Status: ON HOLD | COMMUNITY
End: 2022-05-06

## 2022-04-01 RX ORDER — METOPROLOL SUCCINATE 25 MG/1
25 TABLET, EXTENDED RELEASE ORAL DAILY
Status: ON HOLD | COMMUNITY
End: 2022-05-06

## 2022-04-01 RX ORDER — SPIRONOLACTONE 50 MG/1
50 TABLET, FILM COATED ORAL DAILY
Status: ON HOLD | COMMUNITY
End: 2022-05-10 | Stop reason: HOSPADM

## 2022-04-01 RX ORDER — PANTOPRAZOLE SODIUM 40 MG/1
40 TABLET, DELAYED RELEASE ORAL DAILY
Status: ON HOLD | COMMUNITY
End: 2022-04-04 | Stop reason: SDUPTHER

## 2022-04-01 RX ORDER — GLIPIZIDE 5 MG/1
5 TABLET ORAL
COMMUNITY

## 2022-04-01 RX ORDER — TORSEMIDE 20 MG/1
20 TABLET ORAL DAILY
Status: ON HOLD | COMMUNITY
End: 2022-05-10 | Stop reason: HOSPADM

## 2022-04-01 RX ORDER — HYDROXYZINE HYDROCHLORIDE 25 MG/1
25 TABLET, FILM COATED ORAL EVERY 8 HOURS PRN
Status: ON HOLD | COMMUNITY
End: 2022-05-06

## 2022-04-01 RX ORDER — MULTIVIT-MIN/FERROUS GLUCONATE 9 MG/15 ML
15 LIQUID (ML) ORAL DAILY
COMMUNITY

## 2022-04-01 RX ORDER — QUETIAPINE FUMARATE 300 MG/1
300 TABLET, FILM COATED ORAL NIGHTLY
Status: ON HOLD | COMMUNITY
End: 2022-05-06

## 2022-04-01 RX ADMIN — HYDROMORPHONE HYDROCHLORIDE 1 MG: 1 INJECTION, SOLUTION INTRAMUSCULAR; INTRAVENOUS; SUBCUTANEOUS at 07:15

## 2022-04-01 RX ADMIN — HYDROMORPHONE HYDROCHLORIDE 1 MG: 1 INJECTION, SOLUTION INTRAMUSCULAR; INTRAVENOUS; SUBCUTANEOUS at 18:50

## 2022-04-01 RX ADMIN — POLYETHYLENE GLYCOL 3350 17 G: 17 POWDER, FOR SOLUTION ORAL at 21:01

## 2022-04-01 RX ADMIN — SODIUM CHLORIDE 8 MG/HR: 9 INJECTION, SOLUTION INTRAVENOUS at 15:01

## 2022-04-01 RX ADMIN — HYDROMORPHONE HYDROCHLORIDE 1 MG: 1 INJECTION, SOLUTION INTRAMUSCULAR; INTRAVENOUS; SUBCUTANEOUS at 03:28

## 2022-04-01 RX ADMIN — SENNOSIDES AND DOCUSATE SODIUM 1 TABLET: 50; 8.6 TABLET ORAL at 09:16

## 2022-04-01 RX ADMIN — SENNOSIDES AND DOCUSATE SODIUM 1 TABLET: 50; 8.6 TABLET ORAL at 21:01

## 2022-04-01 RX ADMIN — CEFEPIME HYDROCHLORIDE 2000 MG: 2 INJECTION, POWDER, FOR SOLUTION INTRAVENOUS at 09:21

## 2022-04-01 RX ADMIN — HYDROMORPHONE HYDROCHLORIDE 1 MG: 1 INJECTION, SOLUTION INTRAMUSCULAR; INTRAVENOUS; SUBCUTANEOUS at 09:59

## 2022-04-01 RX ADMIN — SODIUM CHLORIDE, PRESERVATIVE FREE 10 ML: 5 INJECTION INTRAVENOUS at 09:16

## 2022-04-01 RX ADMIN — HYDROMORPHONE HYDROCHLORIDE 1 MG: 1 INJECTION, SOLUTION INTRAMUSCULAR; INTRAVENOUS; SUBCUTANEOUS at 15:50

## 2022-04-01 RX ADMIN — HYDROMORPHONE HYDROCHLORIDE 1 MG: 1 INJECTION, SOLUTION INTRAMUSCULAR; INTRAVENOUS; SUBCUTANEOUS at 22:46

## 2022-04-01 RX ADMIN — GUAIFENESIN SYRUP AND DEXTROMETHORPHAN 10 ML: 100; 10 SYRUP ORAL at 21:01

## 2022-04-01 RX ADMIN — ACETAMINOPHEN 650 MG: 325 TABLET ORAL at 21:01

## 2022-04-01 RX ADMIN — SODIUM CHLORIDE 20 ML: 9 INJECTION, SOLUTION INTRAVENOUS at 09:19

## 2022-04-01 RX ADMIN — HYDROMORPHONE HYDROCHLORIDE 1 MG: 1 INJECTION, SOLUTION INTRAMUSCULAR; INTRAVENOUS; SUBCUTANEOUS at 12:51

## 2022-04-01 RX ADMIN — SODIUM CHLORIDE, PRESERVATIVE FREE 10 ML: 5 INJECTION INTRAVENOUS at 21:01

## 2022-04-01 RX ADMIN — HYDROMORPHONE HYDROCHLORIDE 1 MG: 1 INJECTION, SOLUTION INTRAMUSCULAR; INTRAVENOUS; SUBCUTANEOUS at 00:13

## 2022-04-01 RX ADMIN — CEFTRIAXONE 1000 MG: 1 INJECTION, POWDER, FOR SOLUTION INTRAMUSCULAR; INTRAVENOUS at 14:39

## 2022-04-01 RX ADMIN — ENOXAPARIN SODIUM 40 MG: 100 INJECTION SUBCUTANEOUS at 09:16

## 2022-04-01 ASSESSMENT — PAIN SCALES - GENERAL
PAINLEVEL_OUTOF10: 9
PAINLEVEL_OUTOF10: 8
PAINLEVEL_OUTOF10: 6
PAINLEVEL_OUTOF10: 9
PAINLEVEL_OUTOF10: 8
PAINLEVEL_OUTOF10: 6
PAINLEVEL_OUTOF10: 9
PAINLEVEL_OUTOF10: 9
PAINLEVEL_OUTOF10: 7
PAINLEVEL_OUTOF10: 0
PAINLEVEL_OUTOF10: 9
PAINLEVEL_OUTOF10: 10
PAINLEVEL_OUTOF10: 6
PAINLEVEL_OUTOF10: 5
PAINLEVEL_OUTOF10: 8
PAINLEVEL_OUTOF10: 9

## 2022-04-01 ASSESSMENT — PAIN DESCRIPTION - DESCRIPTORS
DESCRIPTORS: ACHING;CRAMPING
DESCRIPTORS: ACHING;CRAMPING
DESCRIPTORS: CRAMPING
DESCRIPTORS: ACHING;CRAMPING
DESCRIPTORS: CRAMPING
DESCRIPTORS: ACHING;CRAMPING;DISCOMFORT

## 2022-04-01 ASSESSMENT — PAIN DESCRIPTION - PROGRESSION
CLINICAL_PROGRESSION: NOT CHANGED
CLINICAL_PROGRESSION: GRADUALLY WORSENING
CLINICAL_PROGRESSION: NOT CHANGED
CLINICAL_PROGRESSION: GRADUALLY WORSENING
CLINICAL_PROGRESSION: NOT CHANGED
CLINICAL_PROGRESSION: NOT CHANGED

## 2022-04-01 ASSESSMENT — PAIN DESCRIPTION - LOCATION
LOCATION: ABDOMEN

## 2022-04-01 ASSESSMENT — PAIN DESCRIPTION - ONSET
ONSET: ON-GOING
ONSET: AWAKENED FROM SLEEP
ONSET: ON-GOING

## 2022-04-01 ASSESSMENT — PAIN - FUNCTIONAL ASSESSMENT
PAIN_FUNCTIONAL_ASSESSMENT: ACTIVITIES ARE NOT PREVENTED

## 2022-04-01 ASSESSMENT — PAIN DESCRIPTION - PAIN TYPE
TYPE: ACUTE PAIN

## 2022-04-01 ASSESSMENT — PAIN DESCRIPTION - FREQUENCY
FREQUENCY: CONTINUOUS

## 2022-04-01 ASSESSMENT — PAIN DESCRIPTION - ORIENTATION
ORIENTATION: RIGHT;LEFT
ORIENTATION: RIGHT
ORIENTATION: RIGHT;LEFT

## 2022-04-01 NOTE — PROGRESS NOTES
Pt pain has been continuous, pt having 9/10 pain after working with PT/OT. Pt states she is getting minimal relief from IV medication and that its effects \"wear off too fast\". We may need to add something PO in conjunction with.

## 2022-04-01 NOTE — PLAN OF CARE
Problem: Pain:  Goal: Control of chronic pain  Description: Control of chronic pain  Outcome: Met This Shift  Note: Pt has not complained of any chronic pain during shift. Problem: Skin Integrity:  Goal: Absence of new skin breakdown  Description: Absence of new skin breakdown  4/1/2022 0154 by Ada Burroughs RN  Outcome: Met This Shift  Note: Pt has remained free from new skin breakdown during shift. Problem: Falls - Risk of:  Goal: Will remain free from falls  Description: Will remain free from falls  4/1/2022 0154 by Ada Burroughs RN  Outcome: Met This Shift  Note: Pt has remained free from falls during shift. Pt's bed is locked in lowest position with alarm on, call light, bedside table and personal belongings within reach. Problem: Falls - Risk of:  Goal: Absence of physical injury  Description: Absence of physical injury  Outcome: Met This Shift  Note: Pt has remained free from physical injury during shift. Problem: Pain:  Goal: Pain level will decrease  Description: Pain level will decrease  Outcome: Ongoing  Note: Pt has complained of acute pain during shift. Pt receiving PRN dilaudid which has helped manage her pain. Will continue to monitor. Problem: Pain:  Goal: Control of acute pain  Description: Control of acute pain  4/1/2022 0154 by Ada Burroughs RN  Outcome: Ongoing  Note: Pt has complained of acute pain during shift. Pt receiving PRN dilaudid which has helped manage her pain. Will continue to monitor. Problem: Skin Integrity:  Goal: Will show no infection signs and symptoms  Description: Will show no infection signs and symptoms  Outcome: Ongoing  Note: Pt is receiving IV antibiotics at this time. Pt is not showing any other signs of infection at this time. Will continue to monitor.

## 2022-04-01 NOTE — CONSULTS
Clinical Pharmacy Progress Note  Medication History      Asked to verify home medications by Dr. Suki Villalobos. Spoke with patient face to face in her room. When I asked about home medications, patient states she doesn't take any medications at home, including no OTC meds or supplements. When I asked what pharmacy she uses, she said she used to fill meds at the [place], but that's been [place] while ago\", and couldn't list another pharmacy. No pharmacy fill information available in Surescripts. Did confirm that pt filled meds at the [place] in the past - but none since Fall 2021 (and fills prior to that were very sporadic). From reviewing records in [facility], it appears that patient was discharged from [facility] to [facility] in January 2022. Per outpt notes in early March 2022, pt was still at \"[facility]\" - unclear if this was still [facility] or another facility. Unsure when pt was discharged to home - pt unable to tell me. Updated medication list based on most recent encounters at [facility] outpt office visits / ED visit dated 3/7/22 as this is believed to be the most recent / accurate list of what patient is supposed to be taking. Marked last doses as \"more than a week\" as I have no idea when pt last got medications and states she takes nothing at home.      Please call with questions--  Thanks--  uHgo Kincaid, PharmD, BCPS, [facility]  [id number] ([facility])   4/1/2022 3:20 PM      Current Outpatient Medications   Medication Instructions    acetaminophen (TYLENOL) 650 mg, Oral, EVERY 4 HOURS PRN    albuterol (PROVENTIL) 2.5 mg, Nebulization, 4 TIMES DAILY    benzonatate (TESSALON) 100 mg, Oral, 3 TIMES DAILY PRN    calcium carbonate (TUMS) 500 MG chewable tablet 1 tablet, Oral, 3 TIMES DAILY PRN    cetirizine (ZYRTEC) 10 mg, Oral, DAILY    fluticasone (FLONASE) 50 MCG/ACT nasal spray 2 sprays, Each Nostril, DAILY    gabapentin (NEURONTIN) 400 mg, Oral, 3 TIMES DAILY    glipiZIDE (GLUCOTROL) 5 mg, Oral, DAILY BEFORE BREAKFAST    hydrOXYzine (ATARAX) 25 mg, Oral, EVERY 8 HOURS PRN    ipratropium-albuterol (DUONEB) 0.5-2.5 (3) MG/3ML SOLN nebulizer solution 1 vial, Inhalation, EVERY 4 HOURS PRN    lidocaine (LIDODERM) 5 % 1 patch, TransDERmal, DAILY, 12 hours on, 12 hours off.  melatonin 3 mg, Oral, Nightly    metFORMIN (GLUCOPHAGE-XR) 1,000 mg, Oral, 2 times daily    metoprolol succinate (TOPROL XL) 25 mg, Oral, DAILY    miconazole (MICOTIN) 2 % powder Topical, 2 TIMES DAILY, Apply topically 2 times daily.      Multiple Vitamins-Minerals (CENTRUM/CERTA-MARJAN WITH MINERALS ORAL) solution 15 mLs, Oral, EVERY OTHER DAY    naloxone 4 MG/0.1ML LIQD nasal spray 1 spray, Nasal, PRN    pantoprazole (PROTONIX) 40 mg, Oral, DAILY    QUEtiapine (SEROQUEL) 300 mg, Oral, Nightly    spironolactone (ALDACTONE) 50 mg, Oral, DAILY    torsemide (DEMADEX) 20 mg, Oral, DAILY    vitamin D (CHOLECALCIFEROL) 5,000 Units, Oral, DAILY

## 2022-04-01 NOTE — PROGRESS NOTES
This RN spent apx 20 minutes with pt due to her being tearful. We spoke about her pain and sadness. Pt stated \"I just dont know what tomorrow brings and I don't know what to expect. \"  I explained to the pt that the easiest day is always yesterday that she just has to take it a day at a time and always try to find a positive in a negative situation. Pt pain is continuous, however, we have done a great job on staying on top of it. Pt stated she felt \"more at peace\" after our conversation. We as medical providers need to make sure we take time to speak with the patient and check on her emotional state time to time.

## 2022-04-01 NOTE — PROGRESS NOTES
GI Progress Note      NITA Etienne is a 59 y.o. female patient. 1. Abdominal pain, unspecified abdominal location    2. Nausea vomiting and diarrhea    3. Left flank pain    4. Urinary tract infection without hematuria, site unspecified        Admit Date: 3/28/2022    Subjective:     Patient was seen and examined this morning. Patient underwent EGD yesterday. She has remained afebrile overnight. Patient mentions that she was able to eat some breakfast this morning without feeling nauseous. Reports that she still has abdominal pain, diffuse, non radiating. She denies having chest pain, SOB, cough, fever, constipation, diarrhea.        ROS:  As per above    Scheduled Meds:   cefepime  2,000 mg IntraVENous Q8H    sodium chloride flush  10 mL IntraVENous 2 times per day    sennosides-docusate sodium  1 tablet Oral BID    enoxaparin  40 mg SubCUTAneous Daily    sodium chloride flush  5-40 mL IntraVENous 2 times per day       Continuous Infusions:   pantoprozole (PROTONIX) infusion 8 mg/hr (03/31/22 1651)    sodium chloride Stopped (03/30/22 1206)    sodium chloride 5 mL/hr at 03/30/22 1525       PRN Meds:  HYDROmorphone, ipratropium-albuterol, guaiFENesin-dextromethorphan, sodium chloride flush, sodium chloride, promethazine **OR** ondansetron, polyethylene glycol, acetaminophen **OR** acetaminophen, sodium chloride flush, sodium chloride      Objective:       Patient Vitals for the past 24 hrs:   BP Temp Temp src Pulse Resp SpO2 Weight   04/01/22 0715 (!) 140/63 99 °F (37.2 °C) Oral 84 18 -- --   04/01/22 0340 -- -- -- -- -- 92 % --   04/01/22 0332 119/64 99.4 °F (37.4 °C) Oral 92 16 (!) 80 % 239 lb 10.2 oz (108.7 kg)   03/31/22 2101 (!) 141/61 98.4 °F (36.9 °C) Oral 80 16 94 % --   03/31/22 1624 (!) 153/60 99.4 °F (37.4 °C) Oral 76 16 93 % --   03/31/22 1547 (!) 125/56 -- -- 79 14 95 % --   03/31/22 1536 (!) 141/67 -- -- 80 18 93 % --   03/31/22 1526 128/65 -- -- 81 16 95 % --   03/31/22 1515 106/81 98.4 °F (36.9 °C) Temporal 85 16 93 % --   22 1431 (!) 147/77 98.6 °F (37 °C) Tympanic 77 20 90 % --   22 1109 138/61 98.9 °F (37.2 °C) Oral 80 18 93 % --       Exam:  VITALS:  BP (!) 140/63   Pulse 84   Temp 99 °F (37.2 °C) (Oral)   Resp 18   Ht 5' 6\" (1.676 m)   Wt 239 lb 10.2 oz (108.7 kg)   LMP 1982   SpO2 92%   BMI 38.68 kg/m²   TEMPERATURE:  Current - Temp: 99 °F (37.2 °C); Max - Temp  Av.9 °F (37.2 °C)  Min: 98.4 °F (36.9 °C)  Max: 99.4 °F (37.4 °C)      General appearance: alert, appears stated age, cooperative and no distress  Head: Normocephalic, without obvious abnormality, atraumatic  Neck: supple, symmetrical, trachea midline and thyroid not enlarged, symmetric, no tenderness/mass/nodules  CVS:  RRR, Nl s1s2  Lungs CTA Bilaterally, normal effort  Abdomen: soft, non-tender; bowel sounds normal; no masses,  no organomegaly  AAOx3, No asterixis or encephalopathy  Extremities: No edema. Recent Labs     22  0735   WBC 5.0   HGB 10.5*   HCT 30.7*   MCV 87.7   *     Recent Labs     22  0735   *   K 4.0      CO2 25   BUN 6*   CREATININE 0.6     No results for input(s): AST, ALT, ALB, BILIDIR, BILITOT, ALKPHOS in the last 72 hours. No results for input(s): LIPASE, AMYLASE in the last 72 hours. No results for input(s): PROT, INR in the last 72 hours. No results for input(s): PTT in the last 72 hours. No results for input(s): OCCULTBLD in the last 72 hours. Radiology review:   Abdominal ultrasound: Cirrhosis, vasculature patent, cholelithiasis without evidence of acute cholecystitis. Assessment:       Abdominal pain:  Alcoholic cirrhosis:  Nausea:  Gastric ulcer:      Recommendations:       1. Right upper quadrant ultrasound with doppler showed cirrhosis, vasculature patent, cholelithiasis without evidence of acute cholecystitis. 2. AFP pending  3.  Patient underwent EGD yesterday and showed several large but shallow clean based duodenal ulcers with surrounding edema. Three clean based antral ulcers - biopsied with easy bleeding and portal gastropathy. The distal esophagus appears edematous and ulcerated, possible tongue of Balbuena's. 4. Abdominal pain may be due to ulcer disease  5. High dose PPI  6. Follow up EGD in 3 months to confirm healing gastric ulcer and reassess esophagus  7. Last BM was 4 days ago, we will get KUB.      Patient discussed with attending, Dr. Tiffanie Augustin MD.    Clinton Marin MD  PGY-2  4/1/2022  8:53 AM

## 2022-04-01 NOTE — CARE COORDINATION
Talked to admissions at [facility]. They are looking at the case and calling me back. CM will continue to follow patient until discharge. Electronically signed by Ashley Tillman RN on 4/1/2022 at 2:56 PM     Referral to [facility]. [place] does not take trachs. [facility] to follow up. VM box full. Expecting call back from them. Fax went through. Electronically signed by Ashley Tillman RN on 4/1/2022 at 4:01 PM     [address] [facility], [address] [facility]. No return calls.  Electronically signed by Ashley Tillman RN on 4/1/2022 at 4:18 PM

## 2022-04-01 NOTE — PROGRESS NOTES
Hospitalist Progress Note      PCP: No primary care provider on file. Date of Admission: 3/28/2022    CC abdominal pain    Hospital course  Patient is a 64-year-old female with history of alcoholic cirrhosis, status post tracheostomy after COVID-19 pneumonia November 2021, diabetes mellitus type 2, depression, obesity came into ER with a complaint of abdominal pain, nausea and vomiting. She reports that she was taking NSAID. Next  CT abdomen and pelvis showed cirrhotic appearing liver with stigmata of portal hypertension consisting of splenomegaly, small amount of ascites and left upper quadrant varices. Distended gallbladder with cholelithiasis. Admitted with GI consultation    S/p EGD on 3/31 several large but shallow clean-based duodenal ulcers with surrounding edema. 3 clean-based antral ulcers biopsied with easy bleeding and portal gastropathy. The distal esophagus appears edematous and ulcerated possible tongue of Balbuena's. Subjective  Patient seen and examined today. Still having some abdominal pain denies any nausea or vomiting, fever or chills. No acute event reported overnight. Assessment plan  #Abdominal pain secondary to gastric and duodenal ulcers  S/p EGD 3/31 showed duodenal ulcers, gastric ulcers. Continue Protonix drip. Will need EGD in 3 months. Follow-up on H. pylori, pathology. Follow-up on x-ray KUB. Ultrasound right upper quadrant cirrhosis, vasculature patent. Cholelithiasis without evidence of acute cholecystitis. Avoid NSAIDs. #UTI culture showed Klebsiella pneumoniae and Proteus  We will change antibiotics to Rocephin    #Alcoholic liver cirrhosis compensated. F/U on AFP level.      #S/P Tracheostomy after COVID Pneumonia in 11/2021    #Thrombocytopenia suspected 2/2 cirrhosis  cont to monitor        Medications:  Reviewed    Infusion Medications    pantoprozole (PROTONIX) infusion 8 mg/hr (03/31/22 1651)    sodium chloride Stopped (03/30/22 1206)    sodium chloride 20 mL (04/01/22 0919)     Scheduled Medications    cefTRIAXone (ROCEPHIN) IV  1,000 mg IntraVENous Q24H    sodium chloride flush  10 mL IntraVENous 2 times per day    sennosides-docusate sodium  1 tablet Oral BID    enoxaparin  40 mg SubCUTAneous Daily    sodium chloride flush  5-40 mL IntraVENous 2 times per day     PRN Meds: HYDROmorphone, ipratropium-albuterol, guaiFENesin-dextromethorphan, sodium chloride flush, sodium chloride, promethazine **OR** ondansetron, polyethylene glycol, acetaminophen **OR** acetaminophen, sodium chloride flush, sodium chloride      Intake/Output Summary (Last 24 hours) at 4/1/2022 1359  Last data filed at 4/1/2022 0924  Gross per 24 hour   Intake 640 ml   Output 1450 ml   Net -810 ml       Physical Exam Performed:    /77   Pulse 80   Temp 98 °F (36.7 °C) (Oral)   Resp 18   Ht 5' 6\" (1.676 m)   Wt 239 lb 10.2 oz (108.7 kg)   LMP 03/31/1982   SpO2 90%   BMI 38.68 kg/m²     General appearance: ill looking, appears to be older than stated age and cooperative. HEENT: Pupils equal, round, and reactive to light. Conjunctivae/corneas clear. Neck: Supple, tracheostomy  Respiratory:  Normal respiratory effort. Clear to auscultation, bilaterally without Rales/Wheezes/Rhonchi. Cardiovascular: Regular rate and rhythm with normal S1/S2 without murmurs, rubs or gallops. Abdomen: Soft, non-tender, distended with normal bowel sounds. Musculoskeletal: No clubbing, cyanosis. 1+ edema bilaterally. Full range of motion without deformity. Skin: Skin color, texture, turgor normal.  No rashes or lesions. Neurologic:  Neurovascularly intact without any focal sensory/motor deficits.  Cranial nerves: II-XII intact, grossly non-focal.  Psychiatric: Alert and oriented, flat face  Capillary Refill: Brisk,< 3 seconds   Peripheral Pulses: +2 palpable, equal bilaterally       Labs:   Recent Labs     04/01/22  0735   WBC 5.0   HGB 10.5*   HCT 30.7*   *     Recent Labs 04/01/22  0735   *   K 4.0      CO2 25   BUN 6*   CREATININE 0.6   CALCIUM 8.8     No results for input(s): AST, ALT, BILIDIR, BILITOT, ALKPHOS in the last 72 hours. No results for input(s): INR in the last 72 hours. No results for input(s): Sergo Snowball in the last 72 hours. Urinalysis:      Lab Results   Component Value Date    NITRU POSITIVE 03/28/2022    WBCUA 6-9 03/28/2022    BACTERIA 2+ 03/28/2022    RBCUA 5-10 03/28/2022    BLOODU TRACE 03/28/2022    SPECGRAV 1.015 03/28/2022    GLUCOSEU Negative 03/28/2022       Radiology:  US ABDOMEN LIMITED Specify organ? LIVER, GALLBLADDER   Final Result      1. Cirrhosis. 2. Vasculature patent. 3. Cholelithiasis without evidence of acute cholecystitis. US DUP ABD PEL RETRO SCROT COMPLETE   Final Result      1. Cirrhosis. 2. Vasculature patent. 3. Cholelithiasis without evidence of acute cholecystitis. XR CHEST PORTABLE   Final Result      Diffuse bilateral airspace disease, greater on the left. CT ABDOMEN PELVIS W IV CONTRAST Additional Contrast? None   Final Result   1. Cirrhotic appearing liver with stigmata of portal hypertension consisting of splenomegaly, small amount of ascites and left upper quadrant varices. 2. Distended gallbladder with cholelithiasis. This can be seen with prolonged fasting state or chronic cystic duct obstruction. 3. There is some mild wall thickening of the transverse limb of the duodenum with some adjacent retroperitoneal fat stranding. This may reflect a duodenitis. Alternatively, this appearance may be caused by the duodenum being decompressed. This could be    further evaluated with endoscopy. 4. There is some retroperitoneal fat stranding identified within the right upper quadrant and right mid abdomen. This may be related to cirrhosis and portal hypertension. Reactive edema could result in this appearance in the setting of a duodenitis.       XR ABDOMEN (KUB) (SINGLE AP VIEW) (Results Pending)           Assessment/Plan:    Active Hospital Problems    Diagnosis Date Noted    Intractable abdominal pain [R10.9] 03/28/2022         DVT Prophylaxis: SCD  Diet: ADULT DIET;  Regular  Code Status: Full Code    PT/OT Eval Status: in progress    Dispo - inpatient D/C in 2 days    Meeta Chin MD    This chart was likely completed using voice recognition technology and may contain unintended grammatical , phraseology,and/or punctuation errors

## 2022-04-02 LAB
ANION GAP SERPL CALCULATED.3IONS-SCNC: 8 MMOL/L (ref 3–16)
BASOPHILS ABSOLUTE: 0 K/UL (ref 0–0.2)
BASOPHILS RELATIVE PERCENT: 0.4 %
BLOOD CULTURE, ROUTINE: NORMAL
BUN BLDV-MCNC: 5 MG/DL (ref 7–20)
CALCIUM SERPL-MCNC: 8.5 MG/DL (ref 8.3–10.6)
CHLORIDE BLD-SCNC: 100 MMOL/L (ref 99–110)
CO2: 24 MMOL/L (ref 21–32)
CREAT SERPL-MCNC: 0.6 MG/DL (ref 0.6–1.1)
CULTURE, BLOOD 2: NORMAL
EOSINOPHILS ABSOLUTE: 0.2 K/UL (ref 0–0.6)
EOSINOPHILS RELATIVE PERCENT: 1.9 %
GFR AFRICAN AMERICAN: >60
GFR NON-AFRICAN AMERICAN: >60
GLUCOSE BLD-MCNC: 100 MG/DL (ref 70–99)
GLUCOSE BLD-MCNC: 110 MG/DL (ref 70–99)
GLUCOSE BLD-MCNC: 112 MG/DL (ref 70–99)
GLUCOSE BLD-MCNC: 97 MG/DL (ref 70–99)
HCT VFR BLD CALC: 31.2 % (ref 36–48)
HEMOGLOBIN: 10.5 G/DL (ref 12–16)
LYMPHOCYTES ABSOLUTE: 1.1 K/UL (ref 1–5.1)
LYMPHOCYTES RELATIVE PERCENT: 12 %
MCH RBC QN AUTO: 30 PG (ref 26–34)
MCHC RBC AUTO-ENTMCNC: 33.6 G/DL (ref 31–36)
MCV RBC AUTO: 89.2 FL (ref 80–100)
MONOCYTES ABSOLUTE: 0.8 K/UL (ref 0–1.3)
MONOCYTES RELATIVE PERCENT: 8.1 %
NEUTROPHILS ABSOLUTE: 7.2 K/UL (ref 1.7–7.7)
NEUTROPHILS RELATIVE PERCENT: 77.6 %
PDW BLD-RTO: 14.6 % (ref 12.4–15.4)
PERFORMED ON: ABNORMAL
PERFORMED ON: ABNORMAL
PERFORMED ON: NORMAL
PLATELET # BLD: 121 K/UL (ref 135–450)
PMV BLD AUTO: 7.6 FL (ref 5–10.5)
POTASSIUM REFLEX MAGNESIUM: 4 MMOL/L (ref 3.5–5.1)
RBC # BLD: 3.5 M/UL (ref 4–5.2)
SODIUM BLD-SCNC: 132 MMOL/L (ref 136–145)
WBC # BLD: 9.3 K/UL (ref 4–11)

## 2022-04-02 PROCEDURE — 6370000000 HC RX 637 (ALT 250 FOR IP): Performed by: INTERNAL MEDICINE

## 2022-04-02 PROCEDURE — 2580000003 HC RX 258: Performed by: INTERNAL MEDICINE

## 2022-04-02 PROCEDURE — 85025 COMPLETE CBC W/AUTO DIFF WBC: CPT

## 2022-04-02 PROCEDURE — 36415 COLL VENOUS BLD VENIPUNCTURE: CPT

## 2022-04-02 PROCEDURE — 6360000002 HC RX W HCPCS: Performed by: INTERNAL MEDICINE

## 2022-04-02 PROCEDURE — C9113 INJ PANTOPRAZOLE SODIUM, VIA: HCPCS | Performed by: INTERNAL MEDICINE

## 2022-04-02 PROCEDURE — 80048 BASIC METABOLIC PNL TOTAL CA: CPT

## 2022-04-02 PROCEDURE — 1200000000 HC SEMI PRIVATE

## 2022-04-02 PROCEDURE — 94761 N-INVAS EAR/PLS OXIMETRY MLT: CPT

## 2022-04-02 RX ORDER — DEXTROSE MONOHYDRATE 25 G/50ML
12.5 INJECTION, SOLUTION INTRAVENOUS PRN
Status: DISCONTINUED | OUTPATIENT
Start: 2022-04-02 | End: 2022-04-02

## 2022-04-02 RX ORDER — DEXTROSE MONOHYDRATE 50 MG/ML
100 INJECTION, SOLUTION INTRAVENOUS PRN
Status: DISCONTINUED | OUTPATIENT
Start: 2022-04-02 | End: 2022-04-04 | Stop reason: HOSPADM

## 2022-04-02 RX ORDER — METOPROLOL SUCCINATE 25 MG/1
25 TABLET, EXTENDED RELEASE ORAL DAILY
Status: DISCONTINUED | OUTPATIENT
Start: 2022-04-02 | End: 2022-04-04 | Stop reason: HOSPADM

## 2022-04-02 RX ORDER — QUETIAPINE FUMARATE 300 MG/1
300 TABLET, FILM COATED ORAL NIGHTLY
Status: DISCONTINUED | OUTPATIENT
Start: 2022-04-02 | End: 2022-04-04 | Stop reason: HOSPADM

## 2022-04-02 RX ORDER — TORSEMIDE 20 MG/1
20 TABLET ORAL DAILY
Status: DISCONTINUED | OUTPATIENT
Start: 2022-04-02 | End: 2022-04-04 | Stop reason: HOSPADM

## 2022-04-02 RX ORDER — INSULIN LISPRO 100 [IU]/ML
0-6 INJECTION, SOLUTION INTRAVENOUS; SUBCUTANEOUS
Status: DISCONTINUED | OUTPATIENT
Start: 2022-04-02 | End: 2022-04-04 | Stop reason: HOSPADM

## 2022-04-02 RX ORDER — SPIRONOLACTONE 50 MG/1
50 TABLET, FILM COATED ORAL DAILY
Status: DISCONTINUED | OUTPATIENT
Start: 2022-04-02 | End: 2022-04-04 | Stop reason: HOSPADM

## 2022-04-02 RX ORDER — OXYCODONE HYDROCHLORIDE 5 MG/1
5 TABLET ORAL EVERY 4 HOURS PRN
Status: DISCONTINUED | OUTPATIENT
Start: 2022-04-02 | End: 2022-04-04 | Stop reason: HOSPADM

## 2022-04-02 RX ORDER — INSULIN LISPRO 100 [IU]/ML
0-3 INJECTION, SOLUTION INTRAVENOUS; SUBCUTANEOUS NIGHTLY
Status: DISCONTINUED | OUTPATIENT
Start: 2022-04-02 | End: 2022-04-04 | Stop reason: HOSPADM

## 2022-04-02 RX ADMIN — SENNOSIDES AND DOCUSATE SODIUM 1 TABLET: 50; 8.6 TABLET ORAL at 21:16

## 2022-04-02 RX ADMIN — HYDROMORPHONE HYDROCHLORIDE 1 MG: 1 INJECTION, SOLUTION INTRAMUSCULAR; INTRAVENOUS; SUBCUTANEOUS at 06:45

## 2022-04-02 RX ADMIN — TORSEMIDE 20 MG: 20 TABLET ORAL at 10:34

## 2022-04-02 RX ADMIN — POLYETHYLENE GLYCOL 3350 17 G: 17 POWDER, FOR SOLUTION ORAL at 10:33

## 2022-04-02 RX ADMIN — SODIUM CHLORIDE, PRESERVATIVE FREE 10 ML: 5 INJECTION INTRAVENOUS at 21:16

## 2022-04-02 RX ADMIN — SPIRONOLACTONE 50 MG: 50 TABLET ORAL at 10:34

## 2022-04-02 RX ADMIN — ENOXAPARIN SODIUM 40 MG: 100 INJECTION SUBCUTANEOUS at 09:37

## 2022-04-02 RX ADMIN — SENNOSIDES AND DOCUSATE SODIUM 1 TABLET: 50; 8.6 TABLET ORAL at 09:37

## 2022-04-02 RX ADMIN — OXYCODONE HYDROCHLORIDE 5 MG: 5 TABLET ORAL at 18:01

## 2022-04-02 RX ADMIN — SODIUM CHLORIDE, PRESERVATIVE FREE 10 ML: 5 INJECTION INTRAVENOUS at 09:38

## 2022-04-02 RX ADMIN — OXYCODONE HYDROCHLORIDE 5 MG: 5 TABLET ORAL at 14:03

## 2022-04-02 RX ADMIN — ONDANSETRON 4 MG: 2 INJECTION INTRAMUSCULAR; INTRAVENOUS at 06:54

## 2022-04-02 RX ADMIN — HYDROMORPHONE HYDROCHLORIDE 1 MG: 1 INJECTION, SOLUTION INTRAMUSCULAR; INTRAVENOUS; SUBCUTANEOUS at 09:37

## 2022-04-02 RX ADMIN — QUETIAPINE FUMARATE 300 MG: 300 TABLET ORAL at 21:16

## 2022-04-02 RX ADMIN — PROMETHAZINE HYDROCHLORIDE 12.5 MG: 12.5 TABLET ORAL at 21:15

## 2022-04-02 RX ADMIN — GUAIFENESIN SYRUP AND DEXTROMETHORPHAN 10 ML: 100; 10 SYRUP ORAL at 03:43

## 2022-04-02 RX ADMIN — HYDROMORPHONE HYDROCHLORIDE 1 MG: 1 INJECTION, SOLUTION INTRAMUSCULAR; INTRAVENOUS; SUBCUTANEOUS at 03:43

## 2022-04-02 RX ADMIN — GUAIFENESIN SYRUP AND DEXTROMETHORPHAN 10 ML: 100; 10 SYRUP ORAL at 18:02

## 2022-04-02 RX ADMIN — CEFTRIAXONE 1000 MG: 1 INJECTION, POWDER, FOR SOLUTION INTRAMUSCULAR; INTRAVENOUS at 13:58

## 2022-04-02 RX ADMIN — METOPROLOL SUCCINATE 25 MG: 25 TABLET, EXTENDED RELEASE ORAL at 10:34

## 2022-04-02 RX ADMIN — SODIUM CHLORIDE 8 MG/HR: 9 INJECTION, SOLUTION INTRAVENOUS at 17:57

## 2022-04-02 ASSESSMENT — PAIN DESCRIPTION - PROGRESSION
CLINICAL_PROGRESSION: NOT CHANGED

## 2022-04-02 ASSESSMENT — PAIN SCALES - GENERAL
PAINLEVEL_OUTOF10: 8
PAINLEVEL_OUTOF10: 8
PAINLEVEL_OUTOF10: 0
PAINLEVEL_OUTOF10: 8
PAINLEVEL_OUTOF10: 8
PAINLEVEL_OUTOF10: 9

## 2022-04-02 ASSESSMENT — PAIN DESCRIPTION - ONSET
ONSET: ON-GOING
ONSET: ON-GOING

## 2022-04-02 ASSESSMENT — PAIN - FUNCTIONAL ASSESSMENT
PAIN_FUNCTIONAL_ASSESSMENT: ACTIVITIES ARE NOT PREVENTED
PAIN_FUNCTIONAL_ASSESSMENT: ACTIVITIES ARE NOT PREVENTED

## 2022-04-02 ASSESSMENT — PAIN DESCRIPTION - PAIN TYPE
TYPE: ACUTE PAIN
TYPE: ACUTE PAIN

## 2022-04-02 ASSESSMENT — PAIN DESCRIPTION - FREQUENCY
FREQUENCY: CONTINUOUS
FREQUENCY: CONTINUOUS

## 2022-04-02 ASSESSMENT — PAIN DESCRIPTION - DESCRIPTORS
DESCRIPTORS: CRAMPING
DESCRIPTORS: CRAMPING

## 2022-04-02 ASSESSMENT — PAIN DESCRIPTION - LOCATION
LOCATION: ABDOMEN
LOCATION: ABDOMEN

## 2022-04-02 ASSESSMENT — PAIN DESCRIPTION - ORIENTATION
ORIENTATION: RIGHT;LEFT
ORIENTATION: RIGHT;LEFT

## 2022-04-02 NOTE — PROGRESS NOTES
GI Progress Note      NITA Etienne is a 59 y.o. female patient. 1. Abdominal pain, unspecified abdominal location    2. Nausea vomiting and diarrhea    3. Left flank pain    4. Urinary tract infection without hematuria, site unspecified        Admit Date: 3/28/2022    Subjective:       Lying in bed. States not much appetite. Has not moved bowels. Still with some abdominal pain.  Denies N/V.      ROS:  As per above    Scheduled Meds:   metoprolol succinate  25 mg Oral Daily    QUEtiapine  300 mg Oral Nightly    spironolactone  50 mg Oral Daily    torsemide  20 mg Oral Daily    insulin lispro  0-6 Units SubCUTAneous TID WC    insulin lispro  0-3 Units SubCUTAneous Nightly    cefTRIAXone (ROCEPHIN) IV  1,000 mg IntraVENous Q24H    sodium chloride flush  10 mL IntraVENous 2 times per day    sennosides-docusate sodium  1 tablet Oral BID    enoxaparin  40 mg SubCUTAneous Daily    sodium chloride flush  5-40 mL IntraVENous 2 times per day       Continuous Infusions:   dextrose      pantoprozole (PROTONIX) infusion 8 mg/hr (04/01/22 1501)    sodium chloride Stopped (03/30/22 1206)    sodium chloride 20 mL (04/01/22 0919)       PRN Meds:  glucose, glucagon (rDNA), dextrose, oxyCODONE, dextrose bolus (hypoglycemia) **OR** dextrose bolus (hypoglycemia), ipratropium-albuterol, guaiFENesin-dextromethorphan, sodium chloride flush, sodium chloride, promethazine **OR** ondansetron, polyethylene glycol, acetaminophen **OR** acetaminophen, sodium chloride flush, sodium chloride      Objective:       Patient Vitals for the past 24 hrs:   BP Temp Temp src Pulse Resp SpO2 Weight   04/02/22 0937 123/76 98 °F (36.7 °C) Oral 90 20 92 % --   04/02/22 0645 -- -- -- -- -- -- 239 lb 6.7 oz (108.6 kg)   04/02/22 0343 (!) 106/55 98.8 °F (37.1 °C) Oral 79 18 90 % --   04/02/22 0010 114/65 99.4 °F (37.4 °C) Oral 77 -- 90 % --   04/01/22 2058 (!) 166/69 97.9 °F (36.6 °C) Oral 80 18 93 % --   04/01/22 2025 125/73 98 °F (36.7 °C) Oral 77 -- 90 % --   22 1546 121/67 98.4 °F (36.9 °C) Oral 79 16 91 % --   22 1251 110/77 -- -- 80 -- -- --   22 1148 106/68 98 °F (36.7 °C) Oral 79 18 90 % --       Exam:  VITALS:  /76   Pulse 90   Temp 98 °F (36.7 °C) (Oral)   Resp 20   Ht 5' 6\" (1.676 m)   Wt 239 lb 6.7 oz (108.6 kg)   LMP 1982   SpO2 92%   BMI 38.64 kg/m²   TEMPERATURE:  Current - Temp: 98 °F (36.7 °C); Max - Temp  Av.4 °F (36.9 °C)  Min: 97.9 °F (36.6 °C)  Max: 99.4 °F (37.4 °C)      General appearance: alert, appears stated age, cooperative and no distress  Head: Normocephalic, without obvious abnormality, atraumatic  Neck: supple, symmetrical, trachea midline and thyroid not enlarged, symmetric, no tenderness/mass/nodules  CVS:  RRR, Nl s1s2  Lungs CTA Bilaterally, normal effort  Abdomen: soft, obese, mild diffuse tenderness  AAOx3, No asterixis or encephalopathy      Recent Labs     22  0735   WBC 5.0   HGB 10.5*   HCT 30.7*   MCV 87.7   *     Recent Labs     22  0735   *   K 4.0      CO2 25   BUN 6*   CREATININE 0.6     No results for input(s): AST, ALT, ALB, BILIDIR, BILITOT, ALKPHOS in the last 72 hours. No results for input(s): LIPASE, AMYLASE in the last 72 hours. No results for input(s): PROT, INR in the last 72 hours. No results for input(s): PTT in the last 72 hours. No results for input(s): OCCULTBLD in the last 72 hours. Path from EGD - no evidence of H pylori    Radiology review:   AXR  =>      Gaseous distention of the colon. No small bowel dilatation.    No comment on stool burden    Assessment:     Abdominal pain - likely multifactorial - PUD, gas, constipation, narcotics etc - exam benign  Gastric and duodenal ulcer disease  Alcoholic cirrhosis    Recommendations:     Diet as tolerated  Change to Protonix 40mg BID 1/2 hour before breakfast and dinner upon discharge  Miralax for constipation  Patient should have F/U EGD in 3 months to confirm gastric ulcer healing  Will sign off.  Please call with further questions      Erma Gambino MD  4/2/2022  10:20 AM

## 2022-04-02 NOTE — PLAN OF CARE
Immediate Post OP Note    PreOp Diagnosis: left renal mass      PostOp Diagnosis: same      Procedure(s):  NEPHRECTOMY, ROBOT-ASSISTED, USING DA TOY XI - RADICAL. - Wound Class: Clean    Surgeon(s):  PENNY Marsh M.D.    Anesthesiologist/Type of Anesthesia:  Anesthesiologist: Luc Simpson M.D./General    Surgical Staff:  Circulator: Tennille Oro R.N.; Soo Tran R.N.  Relief Scrub: Yuval Shaw R.N.  Scrub Person: Floyd Galarza    Specimens removed if any:  ID Type Source Tests Collected by Time Destination   A : LEFT KIDNEY Tissue Kidney PATHOLOGY SPECIMEN Erickson Peace M.D. 7/22/2021 1347        Estimated Blood Loss: minimal    Findings: no gross adenopathy    Complications: none        7/22/2021 2:22 PM Erickson Peace M.D.   Problem: Pain:  Goal: Control of chronic pain  Description: Control of chronic pain  Outcome: Met This Shift  Note: Pt has not complained of any chronic pain during shift. Problem: Skin Integrity:  Goal: Absence of new skin breakdown  Description: Absence of new skin breakdown  4/2/2022 0318 by Lalo Price RN  Outcome: Met This Shift  Note: Pt has remained free from new skin breakdown during shift. Problem: Falls - Risk of:  Goal: Will remain free from falls  Description: Will remain free from falls  4/2/2022 0318 by Lalo Price RN  Outcome: Met This Shift  Note: Pt has remained free from falls during shift. Pt's bed is locked in lowest position with alarm on, call light, bedside table, and personal belongings within reach. Problem: Falls - Risk of:  Goal: Absence of physical injury  Description: Absence of physical injury  Outcome: Met This Shift  Note: Pt has remained free from physical injury during shift. Problem: Pain:  Goal: Pain level will decrease  Description: Pain level will decrease  Outcome: Ongoing  Note: Pt has complained of acute abd pain during shift. Pt receiving PRN dilaudid to manage pain. Will continue to closely monitor. Problem: Pain:  Goal: Control of acute pain  Description: Control of acute pain  4/2/2022 0318 by Lalo Price RN  Outcome: Ongoing  Note: Pt has complained of acute abd pain during shift. Pt receiving PRN dilaudid to manage pain. Will continue to closely monitor. Problem: Skin Integrity:  Goal: Will show no infection signs and symptoms  Description: Will show no infection signs and symptoms  Outcome: Ongoing  Note: Pt is receiving IV rocephin for pneumonia. Pt is not showing any other signs of infection at this time. Will continue to monitor.

## 2022-04-02 NOTE — PROGRESS NOTES
Hospitalist Progress Note      PCP: No primary care provider on file. Date of Admission: 3/28/2022    CC abdominal pain    Hospital course  Patient is a 57-year-old female with history of alcoholic cirrhosis, status post tracheostomy after COVID-19 pneumonia November 2021, diabetes mellitus type 2, depression, obesity came into ER with a complaint of abdominal pain, nausea and vomiting. She reports that she was taking NSAID. Next  CT abdomen and pelvis showed cirrhotic appearing liver with stigmata of portal hypertension consisting of splenomegaly, small amount of ascites and left upper quadrant varices. Distended gallbladder with cholelithiasis. Admitted with GI consultation    S/p EGD on 3/31 several large but shallow clean-based duodenal ulcers with surrounding edema. 3 clean-based antral ulcers biopsied with easy bleeding and portal gastropathy. The distal esophagus appears edematous and ulcerated possible tongue of Balbuena's. Subjective  Patient seen and examined today. Still having some abdominal pain but it is better. denies any nausea or vomiting, fever or chills. No acute event reported overnight. Asking for Pain meds round the clock. Patient reports that she lost her mind and checked herself out of nursing home. Can not tell me when has been out of medications since then. Assessment plan  #Abdominal pain secondary to gastric and duodenal ulcers  S/p EGD 3/31 showed duodenal ulcers, gastric ulcers. Continue Protonix drip. Will need EGD in 3 months. H.Pylori negative  Ultrasound right upper quadrant cirrhosis, vasculature patent. Cholelithiasis without evidence of acute cholecystitis. Avoid NSAIDs. #UTI culture showed Klebsiella pneumoniae and Proteus  We will change antibiotics to Rocephin    #Alcoholic liver cirrhosis compensated. AFP wnl.      #S/P Tracheostomy after COVID Pneumonia in 11/2021    #Thrombocytopenia suspected 2/2 cirrhosis  cont to monitor        Medications:  Reviewed    Infusion Medications    dextrose      pantoprozole (PROTONIX) infusion 8 mg/hr (04/01/22 1501)    sodium chloride Stopped (03/30/22 1206)    sodium chloride 20 mL (04/01/22 0919)     Scheduled Medications    metoprolol succinate  25 mg Oral Daily    QUEtiapine  300 mg Oral Nightly    spironolactone  50 mg Oral Daily    torsemide  20 mg Oral Daily    insulin lispro  0-6 Units SubCUTAneous TID WC    insulin lispro  0-3 Units SubCUTAneous Nightly    cefTRIAXone (ROCEPHIN) IV  1,000 mg IntraVENous Q24H    sodium chloride flush  10 mL IntraVENous 2 times per day    sennosides-docusate sodium  1 tablet Oral BID    enoxaparin  40 mg SubCUTAneous Daily    sodium chloride flush  5-40 mL IntraVENous 2 times per day     PRN Meds: HYDROmorphone, glucose, dextrose, glucagon (rDNA), dextrose, ipratropium-albuterol, guaiFENesin-dextromethorphan, sodium chloride flush, sodium chloride, promethazine **OR** ondansetron, polyethylene glycol, acetaminophen **OR** acetaminophen, sodium chloride flush, sodium chloride      Intake/Output Summary (Last 24 hours) at 4/2/2022 0952  Last data filed at 4/2/2022 0645  Gross per 24 hour   Intake 810 ml   Output 1050 ml   Net -240 ml       Physical Exam Performed:    /76   Pulse 90   Temp 98 °F (36.7 °C) (Oral)   Resp 20   Ht 5' 6\" (1.676 m)   Wt 239 lb 6.7 oz (108.6 kg)   LMP 03/31/1982   SpO2 92%   BMI 38.64 kg/m²     General appearance: ill looking, appears to be older than stated age and cooperative. NAD  HEENT: Pupils equal, round, and reactive to light. Conjunctivae/corneas clear. Neck: Supple, tracheostomy  Respiratory:  Normal respiratory effort. Clear to auscultation, bilaterally without Rales/Wheezes/Rhonchi. Cardiovascular: Regular rate and rhythm with normal S1/S2 without murmurs, rubs or gallops. Abdomen: Soft, non-tender, distended with normal bowel sounds. Musculoskeletal: No clubbing, cyanosis.  1+ edema bilaterally. Full range of motion without deformity. Skin: Skin color, texture, turgor normal.  No rashes or lesions. Neurologic:  Neurovascularly intact without any focal sensory/motor deficits. Cranial nerves: II-XII intact, grossly non-focal.  Psychiatric: Alert and oriented, flat face  Capillary Refill: Brisk,< 3 seconds   Peripheral Pulses: +2 palpable, equal bilaterally       Labs:   Recent Labs     04/01/22  0735   WBC 5.0   HGB 10.5*   HCT 30.7*   *     Recent Labs     04/01/22  0735   *   K 4.0      CO2 25   BUN 6*   CREATININE 0.6   CALCIUM 8.8     No results for input(s): AST, ALT, BILIDIR, BILITOT, ALKPHOS in the last 72 hours. No results for input(s): INR in the last 72 hours. No results for input(s): Sergo Snowball in the last 72 hours. Urinalysis:      Lab Results   Component Value Date    NITRU POSITIVE 03/28/2022    WBCUA 6-9 03/28/2022    BACTERIA 2+ 03/28/2022    RBCUA 5-10 03/28/2022    BLOODU TRACE 03/28/2022    SPECGRAV 1.015 03/28/2022    GLUCOSEU Negative 03/28/2022       Radiology:  XR ABDOMEN (KUB) (SINGLE AP VIEW)   Final Result      Gaseous distention of the colon. No small bowel dilatation. US ABDOMEN LIMITED Specify organ? LIVER, GALLBLADDER   Final Result      1. Cirrhosis. 2. Vasculature patent. 3. Cholelithiasis without evidence of acute cholecystitis. US DUP ABD PEL RETRO SCROT COMPLETE   Final Result      1. Cirrhosis. 2. Vasculature patent. 3. Cholelithiasis without evidence of acute cholecystitis. XR CHEST PORTABLE   Final Result      Diffuse bilateral airspace disease, greater on the left. CT ABDOMEN PELVIS W IV CONTRAST Additional Contrast? None   Final Result   1. Cirrhotic appearing liver with stigmata of portal hypertension consisting of splenomegaly, small amount of ascites and left upper quadrant varices. 2. Distended gallbladder with cholelithiasis.  This can be seen with prolonged fasting state or chronic cystic duct obstruction. 3. There is some mild wall thickening of the transverse limb of the duodenum with some adjacent retroperitoneal fat stranding. This may reflect a duodenitis. Alternatively, this appearance may be caused by the duodenum being decompressed. This could be    further evaluated with endoscopy. 4. There is some retroperitoneal fat stranding identified within the right upper quadrant and right mid abdomen. This may be related to cirrhosis and portal hypertension. Reactive edema could result in this appearance in the setting of a duodenitis. Assessment/Plan:    Active Hospital Problems    Diagnosis Date Noted    Intractable abdominal pain [R10.9] 03/28/2022         DVT Prophylaxis: SCD  Diet: ADULT DIET;  Regular  Code Status: Full Code    PT/OT Eval Status: consulted    Dispo - inpatient D/C in 2 days    Denton Valero MD    This chart was likely completed using voice recognition technology and may contain unintended grammatical , phraseology,and/or punctuation errors

## 2022-04-03 LAB
ANION GAP SERPL CALCULATED.3IONS-SCNC: 12 MMOL/L (ref 3–16)
BASOPHILS ABSOLUTE: 0 K/UL (ref 0–0.2)
BASOPHILS RELATIVE PERCENT: 0.2 %
BUN BLDV-MCNC: 9 MG/DL (ref 7–20)
CALCIUM SERPL-MCNC: 8.5 MG/DL (ref 8.3–10.6)
CHLORIDE BLD-SCNC: 95 MMOL/L (ref 99–110)
CO2: 27 MMOL/L (ref 21–32)
CREAT SERPL-MCNC: 0.8 MG/DL (ref 0.6–1.1)
EOSINOPHILS ABSOLUTE: 0.2 K/UL (ref 0–0.6)
EOSINOPHILS RELATIVE PERCENT: 1.7 %
GFR AFRICAN AMERICAN: >60
GFR NON-AFRICAN AMERICAN: >60
GLUCOSE BLD-MCNC: 103 MG/DL (ref 70–99)
GLUCOSE BLD-MCNC: 110 MG/DL (ref 70–99)
GLUCOSE BLD-MCNC: 123 MG/DL (ref 70–99)
GLUCOSE BLD-MCNC: 83 MG/DL (ref 70–99)
GLUCOSE BLD-MCNC: 93 MG/DL (ref 70–99)
HCT VFR BLD CALC: 30.3 % (ref 36–48)
HEMOGLOBIN: 10.5 G/DL (ref 12–16)
LYMPHOCYTES ABSOLUTE: 1.8 K/UL (ref 1–5.1)
LYMPHOCYTES RELATIVE PERCENT: 20 %
MAGNESIUM: 1.2 MG/DL (ref 1.8–2.4)
MCH RBC QN AUTO: 30.3 PG (ref 26–34)
MCHC RBC AUTO-ENTMCNC: 34.7 G/DL (ref 31–36)
MCV RBC AUTO: 87.3 FL (ref 80–100)
MONOCYTES ABSOLUTE: 0.8 K/UL (ref 0–1.3)
MONOCYTES RELATIVE PERCENT: 8.5 %
NEUTROPHILS ABSOLUTE: 6.2 K/UL (ref 1.7–7.7)
NEUTROPHILS RELATIVE PERCENT: 69.6 %
PDW BLD-RTO: 14.7 % (ref 12.4–15.4)
PERFORMED ON: ABNORMAL
PERFORMED ON: NORMAL
PLATELET # BLD: 124 K/UL (ref 135–450)
PMV BLD AUTO: 7.7 FL (ref 5–10.5)
POTASSIUM REFLEX MAGNESIUM: 3.5 MMOL/L (ref 3.5–5.1)
RBC # BLD: 3.47 M/UL (ref 4–5.2)
SODIUM BLD-SCNC: 134 MMOL/L (ref 136–145)
WBC # BLD: 8.9 K/UL (ref 4–11)

## 2022-04-03 PROCEDURE — 85025 COMPLETE CBC W/AUTO DIFF WBC: CPT

## 2022-04-03 PROCEDURE — 6360000002 HC RX W HCPCS: Performed by: INTERNAL MEDICINE

## 2022-04-03 PROCEDURE — 83735 ASSAY OF MAGNESIUM: CPT

## 2022-04-03 PROCEDURE — 2580000003 HC RX 258: Performed by: INTERNAL MEDICINE

## 2022-04-03 PROCEDURE — 80048 BASIC METABOLIC PNL TOTAL CA: CPT

## 2022-04-03 PROCEDURE — 36415 COLL VENOUS BLD VENIPUNCTURE: CPT

## 2022-04-03 PROCEDURE — 6370000000 HC RX 637 (ALT 250 FOR IP): Performed by: INTERNAL MEDICINE

## 2022-04-03 PROCEDURE — C9113 INJ PANTOPRAZOLE SODIUM, VIA: HCPCS | Performed by: INTERNAL MEDICINE

## 2022-04-03 PROCEDURE — 1200000000 HC SEMI PRIVATE

## 2022-04-03 RX ORDER — MAGNESIUM SULFATE IN WATER 40 MG/ML
4000 INJECTION, SOLUTION INTRAVENOUS ONCE
Status: COMPLETED | OUTPATIENT
Start: 2022-04-03 | End: 2022-04-03

## 2022-04-03 RX ORDER — PANTOPRAZOLE SODIUM 40 MG/1
40 TABLET, DELAYED RELEASE ORAL
Status: DISCONTINUED | OUTPATIENT
Start: 2022-04-03 | End: 2022-04-04 | Stop reason: HOSPADM

## 2022-04-03 RX ADMIN — MAGNESIUM SULFATE HEPTAHYDRATE 4000 MG: 4 INJECTION, SOLUTION INTRAVENOUS at 11:31

## 2022-04-03 RX ADMIN — ENOXAPARIN SODIUM 40 MG: 100 INJECTION SUBCUTANEOUS at 09:02

## 2022-04-03 RX ADMIN — SODIUM CHLORIDE 8 MG/HR: 9 INJECTION, SOLUTION INTRAVENOUS at 04:58

## 2022-04-03 RX ADMIN — PANTOPRAZOLE SODIUM 40 MG: 40 TABLET, DELAYED RELEASE ORAL at 17:19

## 2022-04-03 RX ADMIN — OXYCODONE HYDROCHLORIDE 5 MG: 5 TABLET ORAL at 20:40

## 2022-04-03 RX ADMIN — SODIUM CHLORIDE, PRESERVATIVE FREE 10 ML: 5 INJECTION INTRAVENOUS at 09:03

## 2022-04-03 RX ADMIN — CEFTRIAXONE 1000 MG: 1 INJECTION, POWDER, FOR SOLUTION INTRAMUSCULAR; INTRAVENOUS at 15:18

## 2022-04-03 RX ADMIN — OXYCODONE HYDROCHLORIDE 5 MG: 5 TABLET ORAL at 12:59

## 2022-04-03 RX ADMIN — QUETIAPINE FUMARATE 300 MG: 300 TABLET ORAL at 20:40

## 2022-04-03 RX ADMIN — SODIUM CHLORIDE, PRESERVATIVE FREE 10 ML: 5 INJECTION INTRAVENOUS at 20:41

## 2022-04-03 RX ADMIN — SENNOSIDES AND DOCUSATE SODIUM 1 TABLET: 50; 8.6 TABLET ORAL at 09:04

## 2022-04-03 RX ADMIN — SENNOSIDES AND DOCUSATE SODIUM 1 TABLET: 50; 8.6 TABLET ORAL at 20:40

## 2022-04-03 RX ADMIN — OXYCODONE HYDROCHLORIDE 5 MG: 5 TABLET ORAL at 00:24

## 2022-04-03 ASSESSMENT — PAIN SCALES - GENERAL
PAINLEVEL_OUTOF10: 6
PAINLEVEL_OUTOF10: 2
PAINLEVEL_OUTOF10: 9
PAINLEVEL_OUTOF10: 10
PAINLEVEL_OUTOF10: 9

## 2022-04-03 ASSESSMENT — PAIN DESCRIPTION - PROGRESSION
CLINICAL_PROGRESSION: NOT CHANGED

## 2022-04-03 NOTE — PROGRESS NOTES
Hospitalist Progress Note      PCP: No primary care provider on file. Date of Admission: 3/28/2022    CC abdominal pain    Hospital course  Patient is a 78-year-old female with history of alcoholic cirrhosis, status post tracheostomy after COVID-19 pneumonia November 2021, diabetes mellitus type 2, depression, obesity came into ER with a complaint of abdominal pain, nausea and vomiting. She reports that she was taking NSAID. Next  CT abdomen and pelvis showed cirrhotic appearing liver with stigmata of portal hypertension consisting of splenomegaly, small amount of ascites and left upper quadrant varices. Distended gallbladder with cholelithiasis. Admitted with GI consultation    S/p EGD on 3/31 several large but shallow clean-based duodenal ulcers with surrounding edema. 3 clean-based antral ulcers biopsied with easy bleeding and portal gastropathy. The distal esophagus appears edematous and ulcerated possible tongue of Balbuena's. Subjective  Patient seen and examined today. Still having some abdominal pain but it is better. Had 2 bowel movements yesterday. Denies any nausea or vomiting, fever or chills. Assessment plan  #Abdominal pain secondary to gastric and duodenal ulcers  S/p EGD 3/31 showed duodenal ulcers, gastric ulcers. Change Protonix to 40 twice daily. Will need EGD in 3 months. H.Pylori negative  Ultrasound right upper quadrant cirrhosis, vasculature patent. Cholelithiasis without evidence of acute cholecystitis. Avoid NSAIDs. #UTI culture showed Klebsiella pneumoniae and Proteus  We will change antibiotics to Rocephin    #Alcoholic liver cirrhosis compensated. AFP wnl.      #S/P Tracheostomy after COVID Pneumonia in 11/2021    #Thrombocytopenia suspected 2/2 cirrhosis  cont to monitor        Medications:  Reviewed    Infusion Medications    dextrose      sodium chloride Stopped (03/30/22 1206)    sodium chloride 20 mL (04/01/22 3926)     Scheduled Medications    pantoprazole  40 mg Oral BID AC    magnesium sulfate  4,000 mg IntraVENous Once    metoprolol succinate  25 mg Oral Daily    QUEtiapine  300 mg Oral Nightly    spironolactone  50 mg Oral Daily    torsemide  20 mg Oral Daily    insulin lispro  0-6 Units SubCUTAneous TID WC    insulin lispro  0-3 Units SubCUTAneous Nightly    cefTRIAXone (ROCEPHIN) IV  1,000 mg IntraVENous Q24H    sodium chloride flush  10 mL IntraVENous 2 times per day    sennosides-docusate sodium  1 tablet Oral BID    enoxaparin  40 mg SubCUTAneous Daily    sodium chloride flush  5-40 mL IntraVENous 2 times per day     PRN Meds: glucose, glucagon (rDNA), dextrose, oxyCODONE, dextrose bolus (hypoglycemia) **OR** dextrose bolus (hypoglycemia), ipratropium-albuterol, guaiFENesin-dextromethorphan, sodium chloride flush, sodium chloride, promethazine **OR** ondansetron, polyethylene glycol, acetaminophen **OR** acetaminophen, sodium chloride flush, sodium chloride      Intake/Output Summary (Last 24 hours) at 4/3/2022 1150  Last data filed at 4/3/2022 0858  Gross per 24 hour   Intake 170 ml   Output 2050 ml   Net -1880 ml       Physical Exam Performed:    BP (!) 99/55   Pulse 72   Temp 98 °F (36.7 °C) (Oral)   Resp 18   Ht 5' 6\" (1.676 m)   Wt 239 lb 10.2 oz (108.7 kg)   LMP 03/31/1982   SpO2 92%   BMI 38.68 kg/m²     General appearance: ill looking, appears to be older than stated age and cooperative. NAD  HEENT: Pupils equal, round, and reactive to light. Conjunctivae/corneas clear. Neck: Supple, tracheostomy  Respiratory:  Normal respiratory effort. Clear to auscultation, bilaterally without Rales/Wheezes/Rhonchi. Cardiovascular: Regular rate and rhythm with normal S1/S2 without murmurs, rubs or gallops. Abdomen: Soft, non-tender, distended with normal bowel sounds. Musculoskeletal: No clubbing, cyanosis. 1+ edema bilaterally. Full range of motion without deformity.   Skin: Skin color, texture, turgor normal.  No rashes or lesions. Neurologic:  Neurovascularly intact without any focal sensory/motor deficits. Cranial nerves: II-XII intact, grossly non-focal.  Psychiatric: Alert and oriented, flat face  Capillary Refill: Brisk,< 3 seconds   Peripheral Pulses: +2 palpable, equal bilaterally       Labs:   Recent Labs     04/01/22  0735 04/02/22  1001 04/03/22  0727   WBC 5.0 9.3 8.9   HGB 10.5* 10.5* 10.5*   HCT 30.7* 31.2* 30.3*   * 121* 124*     Recent Labs     04/01/22  0735 04/02/22  1001 04/03/22  0727   * 132* 134*   K 4.0 4.0 3.5    100 95*   CO2 25 24 27   BUN 6* 5* 9   CREATININE 0.6 0.6 0.8   CALCIUM 8.8 8.5 8.5     No results for input(s): AST, ALT, BILIDIR, BILITOT, ALKPHOS in the last 72 hours. No results for input(s): INR in the last 72 hours. No results for input(s): Mel Teran in the last 72 hours. Urinalysis:      Lab Results   Component Value Date    NITRU POSITIVE 03/28/2022    WBCUA 6-9 03/28/2022    BACTERIA 2+ 03/28/2022    RBCUA 5-10 03/28/2022    BLOODU TRACE 03/28/2022    SPECGRAV 1.015 03/28/2022    GLUCOSEU Negative 03/28/2022       Radiology:  XR ABDOMEN (KUB) (SINGLE AP VIEW)   Final Result      Gaseous distention of the colon. No small bowel dilatation. US ABDOMEN LIMITED Specify organ? LIVER, GALLBLADDER   Final Result      1. Cirrhosis. 2. Vasculature patent. 3. Cholelithiasis without evidence of acute cholecystitis. US DUP ABD PEL RETRO SCROT COMPLETE   Final Result      1. Cirrhosis. 2. Vasculature patent. 3. Cholelithiasis without evidence of acute cholecystitis. XR CHEST PORTABLE   Final Result      Diffuse bilateral airspace disease, greater on the left. CT ABDOMEN PELVIS W IV CONTRAST Additional Contrast? None   Final Result   1. Cirrhotic appearing liver with stigmata of portal hypertension consisting of splenomegaly, small amount of ascites and left upper quadrant varices. 2. Distended gallbladder with cholelithiasis.  This can be seen with prolonged fasting state or chronic cystic duct obstruction. 3. There is some mild wall thickening of the transverse limb of the duodenum with some adjacent retroperitoneal fat stranding. This may reflect a duodenitis. Alternatively, this appearance may be caused by the duodenum being decompressed. This could be    further evaluated with endoscopy. 4. There is some retroperitoneal fat stranding identified within the right upper quadrant and right mid abdomen. This may be related to cirrhosis and portal hypertension. Reactive edema could result in this appearance in the setting of a duodenitis. Assessment/Plan:    Active Hospital Problems    Diagnosis Date Noted    Intractable abdominal pain [R10.9] 03/28/2022         DVT Prophylaxis: SCD  Diet: ADULT DIET; Regular; 4 carb choices (60 gm/meal); Low Sodium (2 gm)  Code Status: Full Code    PT/OT Eval Status: in progress    Dispo - inpatient D/C in 24 to 48 hours. Will need placement.     Joseph Simons MD    This chart was likely completed using voice recognition technology and may contain unintended grammatical , phraseology,and/or punctuation errors

## 2022-04-03 NOTE — PLAN OF CARE
Problem: Pain:  Goal: Pain level will decrease  Description: Pain level will decrease  Outcome: Ongoing  Pt complains of abdominal pain.  PRN meds as needed       Problem: Skin Integrity:  Goal: Will show no infection signs and symptoms  Description: Will show no infection signs and symptoms  Outcome: Ongoing     Problem: Falls - Risk of:  Goal: Will remain free from falls  Description: Will remain free from falls  Outcome: Ongoing  No falls noted

## 2022-04-04 VITALS
WEIGHT: 229.94 LBS | OXYGEN SATURATION: 92 % | HEART RATE: 75 BPM | SYSTOLIC BLOOD PRESSURE: 109 MMHG | TEMPERATURE: 98.8 F | RESPIRATION RATE: 16 BRPM | BODY MASS INDEX: 36.95 KG/M2 | DIASTOLIC BLOOD PRESSURE: 71 MMHG | HEIGHT: 66 IN

## 2022-04-04 LAB
ANION GAP SERPL CALCULATED.3IONS-SCNC: 12 MMOL/L (ref 3–16)
BASOPHILS ABSOLUTE: 0.1 K/UL (ref 0–0.2)
BASOPHILS RELATIVE PERCENT: 0.6 %
BUN BLDV-MCNC: 9 MG/DL (ref 7–20)
CALCIUM SERPL-MCNC: 8.3 MG/DL (ref 8.3–10.6)
CHLORIDE BLD-SCNC: 96 MMOL/L (ref 99–110)
CO2: 26 MMOL/L (ref 21–32)
CREAT SERPL-MCNC: 0.7 MG/DL (ref 0.6–1.1)
EOSINOPHILS ABSOLUTE: 0.2 K/UL (ref 0–0.6)
EOSINOPHILS RELATIVE PERCENT: 2 %
GFR AFRICAN AMERICAN: >60
GFR NON-AFRICAN AMERICAN: >60
GLUCOSE BLD-MCNC: 100 MG/DL (ref 70–99)
GLUCOSE BLD-MCNC: 112 MG/DL (ref 70–99)
GLUCOSE BLD-MCNC: 114 MG/DL (ref 70–99)
GLUCOSE BLD-MCNC: 160 MG/DL (ref 70–99)
HCT VFR BLD CALC: 32.2 % (ref 36–48)
HEMOGLOBIN: 10.9 G/DL (ref 12–16)
LYMPHOCYTES ABSOLUTE: 1.3 K/UL (ref 1–5.1)
LYMPHOCYTES RELATIVE PERCENT: 15.7 %
MAGNESIUM: 1.5 MG/DL (ref 1.8–2.4)
MCH RBC QN AUTO: 29.7 PG (ref 26–34)
MCHC RBC AUTO-ENTMCNC: 33.9 G/DL (ref 31–36)
MCV RBC AUTO: 87.7 FL (ref 80–100)
MONOCYTES ABSOLUTE: 0.7 K/UL (ref 0–1.3)
MONOCYTES RELATIVE PERCENT: 7.9 %
NEUTROPHILS ABSOLUTE: 6.2 K/UL (ref 1.7–7.7)
NEUTROPHILS RELATIVE PERCENT: 73.8 %
PDW BLD-RTO: 15 % (ref 12.4–15.4)
PERFORMED ON: ABNORMAL
PLATELET # BLD: 135 K/UL (ref 135–450)
PMV BLD AUTO: 7.8 FL (ref 5–10.5)
POTASSIUM REFLEX MAGNESIUM: 3.3 MMOL/L (ref 3.5–5.1)
RBC # BLD: 3.68 M/UL (ref 4–5.2)
SODIUM BLD-SCNC: 134 MMOL/L (ref 136–145)
WBC # BLD: 8.4 K/UL (ref 4–11)

## 2022-04-04 PROCEDURE — 2580000003 HC RX 258: Performed by: INTERNAL MEDICINE

## 2022-04-04 PROCEDURE — 6370000000 HC RX 637 (ALT 250 FOR IP): Performed by: INTERNAL MEDICINE

## 2022-04-04 PROCEDURE — 80048 BASIC METABOLIC PNL TOTAL CA: CPT

## 2022-04-04 PROCEDURE — 36415 COLL VENOUS BLD VENIPUNCTURE: CPT

## 2022-04-04 PROCEDURE — 6360000002 HC RX W HCPCS: Performed by: INTERNAL MEDICINE

## 2022-04-04 PROCEDURE — 83735 ASSAY OF MAGNESIUM: CPT

## 2022-04-04 PROCEDURE — 85025 COMPLETE CBC W/AUTO DIFF WBC: CPT

## 2022-04-04 RX ORDER — POTASSIUM CHLORIDE 20 MEQ/1
40 TABLET, EXTENDED RELEASE ORAL ONCE
Status: COMPLETED | OUTPATIENT
Start: 2022-04-04 | End: 2022-04-04

## 2022-04-04 RX ORDER — PANTOPRAZOLE SODIUM 40 MG/1
40 TABLET, DELAYED RELEASE ORAL 2 TIMES DAILY
Qty: 60 TABLET | Refills: 1 | Status: ON HOLD | DISCHARGE
Start: 2022-04-04 | End: 2022-05-10 | Stop reason: HOSPADM

## 2022-04-04 RX ORDER — MAGNESIUM SULFATE IN WATER 40 MG/ML
4000 INJECTION, SOLUTION INTRAVENOUS ONCE
Status: COMPLETED | OUTPATIENT
Start: 2022-04-04 | End: 2022-04-04

## 2022-04-04 RX ORDER — GABAPENTIN 400 MG/1
400 CAPSULE ORAL 3 TIMES DAILY
Qty: 3 CAPSULE | Refills: 0 | Status: ON HOLD | OUTPATIENT
Start: 2022-04-04 | End: 2022-05-06

## 2022-04-04 RX ORDER — OXYCODONE HYDROCHLORIDE 5 MG/1
5 TABLET ORAL EVERY 8 HOURS PRN
Qty: 9 TABLET | Refills: 0 | Status: SHIPPED | OUTPATIENT
Start: 2022-04-04 | End: 2022-04-07

## 2022-04-04 RX ADMIN — SPIRONOLACTONE 50 MG: 50 TABLET ORAL at 08:42

## 2022-04-04 RX ADMIN — INSULIN LISPRO 1 UNITS: 100 INJECTION, SOLUTION INTRAVENOUS; SUBCUTANEOUS at 12:35

## 2022-04-04 RX ADMIN — OXYCODONE HYDROCHLORIDE 5 MG: 5 TABLET ORAL at 18:28

## 2022-04-04 RX ADMIN — METOPROLOL SUCCINATE 25 MG: 25 TABLET, EXTENDED RELEASE ORAL at 07:43

## 2022-04-04 RX ADMIN — OXYCODONE HYDROCHLORIDE 5 MG: 5 TABLET ORAL at 04:10

## 2022-04-04 RX ADMIN — SODIUM CHLORIDE, PRESERVATIVE FREE 10 ML: 5 INJECTION INTRAVENOUS at 10:16

## 2022-04-04 RX ADMIN — OXYCODONE HYDROCHLORIDE 5 MG: 5 TABLET ORAL at 11:44

## 2022-04-04 RX ADMIN — PANTOPRAZOLE SODIUM 40 MG: 40 TABLET, DELAYED RELEASE ORAL at 17:50

## 2022-04-04 RX ADMIN — OXYCODONE HYDROCHLORIDE 5 MG: 5 TABLET ORAL at 07:42

## 2022-04-04 RX ADMIN — POTASSIUM CHLORIDE 40 MEQ: 1500 TABLET, EXTENDED RELEASE ORAL at 10:18

## 2022-04-04 RX ADMIN — SENNOSIDES AND DOCUSATE SODIUM 1 TABLET: 50; 8.6 TABLET ORAL at 07:42

## 2022-04-04 RX ADMIN — SODIUM CHLORIDE 25 ML: 9 INJECTION, SOLUTION INTRAVENOUS at 12:37

## 2022-04-04 RX ADMIN — SODIUM CHLORIDE, PRESERVATIVE FREE 10 ML: 5 INJECTION INTRAVENOUS at 08:42

## 2022-04-04 RX ADMIN — PANTOPRAZOLE SODIUM 40 MG: 40 TABLET, DELAYED RELEASE ORAL at 06:16

## 2022-04-04 RX ADMIN — TORSEMIDE 20 MG: 20 TABLET ORAL at 08:42

## 2022-04-04 RX ADMIN — ENOXAPARIN SODIUM 40 MG: 100 INJECTION SUBCUTANEOUS at 08:41

## 2022-04-04 RX ADMIN — SALINE NASAL SPRAY 1 SPRAY: 1.5 SOLUTION NASAL at 03:56

## 2022-04-04 RX ADMIN — MAGNESIUM SULFATE HEPTAHYDRATE 4000 MG: 4 INJECTION, SOLUTION INTRAVENOUS at 12:38

## 2022-04-04 ASSESSMENT — PAIN SCALES - GENERAL
PAINLEVEL_OUTOF10: 9
PAINLEVEL_OUTOF10: 7
PAINLEVEL_OUTOF10: 9
PAINLEVEL_OUTOF10: 7
PAINLEVEL_OUTOF10: 9

## 2022-04-04 ASSESSMENT — PAIN DESCRIPTION - ONSET: ONSET: AWAKENED FROM SLEEP

## 2022-04-04 ASSESSMENT — PAIN DESCRIPTION - PROGRESSION: CLINICAL_PROGRESSION: GRADUALLY WORSENING

## 2022-04-04 ASSESSMENT — PAIN DESCRIPTION - LOCATION
LOCATION: ABDOMEN;BACK
LOCATION: GENERALIZED

## 2022-04-04 ASSESSMENT — PAIN DESCRIPTION - FREQUENCY
FREQUENCY: CONTINUOUS
FREQUENCY: INTERMITTENT

## 2022-04-04 ASSESSMENT — PAIN DESCRIPTION - DESCRIPTORS
DESCRIPTORS: ACHING;CRAMPING;DISCOMFORT
DESCRIPTORS: ACHING

## 2022-04-04 ASSESSMENT — PAIN DESCRIPTION - PAIN TYPE
TYPE: CHRONIC PAIN
TYPE: ACUTE PAIN;CHRONIC PAIN

## 2022-04-04 ASSESSMENT — PAIN DESCRIPTION - ORIENTATION: ORIENTATION: OTHER (COMMENT)

## 2022-04-04 NOTE — CARE COORDINATION
CM Spoke with pt  And  Aware  Of  Placement  / referral process: And referrals pending :  EN  Spoke with Suri Olea at  [facility]  admissions And she is having her  DON review ad will get back with  CM . [address] [facility] [address]       Phone: [phone]       Fax: [phone]        Awaiting call back  With determination. Electronically signed by Omid Julio RN on 4/4/2022 at 3:07 PM      +++++++++++++++++++++++++++++++++++++++++++++++++++        CM following up on referrals  CM called  [facility]. and  VM box  Is  Full not able to leave  Message. Will try again later. [address], [address] [place] [address]       Phone: [phone]       Fax: [phone]        Pt  With [facility]  But is  Capped  And  Has  O2 via nc. CM to make additional referral to SNF in [place] area. .   No pre cert  Needed      Pending - Request Sent     Radha BLAKE [address] Pending  Fax [phone] [facility] Pending                Electronically signed by Omid Julio RN on 4/4/2022 at 1:09 PM         Omid Julio RN Case Manager  [facility]  [address]   [facility] [address]  [phone]  Fax [phone]

## 2022-04-04 NOTE — PROGRESS NOTES
Discharge order received. Discharge paperwork completed by RN, [address] and AVS placed in transport packet. Prescription put in transport packet, copy of prescription in chart. IV removed, non-telemetry. RN attempted to call report on two different occasions-first occasion no answer, second occasion facility refused report d/t it being report time for them as well. Patient is scheduled to be picked up around 1900, RN will give report to transport company.

## 2022-04-04 NOTE — DISCHARGE INSTR - COC
Continuity of Care Form    Patient Name: Arie Sanchez   :  1965  MRN:  [id number]    Admit date:  3/28/2022  Discharge date:  ***    Code Status Order: Full Code   Advance Directives:   Advance Care Flowsheet Documentation       Date/Time Healthcare Directive Type of Healthcare Directive Copy in [address] Agent's Name Healthcare Agent's Phone Number    [phone] Yes, patient has an advance directive for healthcare treatment Living will  [place] has copy -- -- -- --            Admitting Physician:  Kezia Willingham MD  PCP: No primary care provider on file. Discharging Nurse: [facility] Unit/Room#: 5667/1706-39  Discharging Unit Phone Number: ***    Emergency Contact:   No emergency contact information on file. Past Surgical History:  Past Surgical History:   Procedure Laterality Date    UPPER GASTROINTESTINAL ENDOSCOPY N/A 3/31/2022    EGD BIOPSY performed by Sindy Espinoza MD at [facility] ENDOSCOPY       Immunization History:   Immunization History   Administered Date(s) Administered    COVID-19, [employer], Primary or Immunocompromised, PF, 100mcg/0.5mL 2022       Active Problems:  Patient Active Problem List   Diagnosis Code    Intractable abdominal pain R10.9       Isolation/Infection:   Isolation            No Isolation          Patient Infection Status       Infection Onset Added Last Indicated Last Indicated By Review Planned Expiration Resolved Resolved By    None active    Resolved    C-diff Rule Out  22 Jess Roe RN   22 Jess Roe RN    C.  Diff lab ordered requiring collection  >48H, order exp    COVID-19 (Rule Out) 22 COVID-19, Rapid (Ordered)   22 Rule-Out Test Resulted    C-diff Rule Out 22 Clostridium difficile toxin/antigen (Ordered)   22 Jess Roe RN    Not detected, c diff iso no longer req            Nurse Assessment:  Last Vital Signs: BP (!) 102/52   Pulse 83   Temp 97.9 °F (36.6 °C) (Oral)   Resp 15   Ht 5' 6\" (1.676 m)   Wt 229 lb 15 oz (104.3 kg)   LMP 03/31/1982   SpO2 97%   BMI 37.11 kg/m²     Last documented pain score (0-10 scale): Pain Level: 7  Last Weight:   Wt Readings from Last 1 Encounters:   04/04/22 229 lb 15 oz (104.3 kg)     Mental Status:  oriented and alert    IV Access:  - None    Nursing Mobility/ADLs:  Walking   Assisted  Transfer  Assisted  Bathing  Assisted  Dressing  Assisted  Toileting  Assisted  Feeding  Assisted  Med Admin  Assisted  Med Delivery   whole    Wound Care Documentation and Therapy:        Elimination:  Continence: Bowel: No  Bladder: No  Urinary Catheter: None   Colostomy/Ileostomy/Ileal Conduit: No       Date of Last BM: 04/04/2022    Intake/Output Summary (Last 24 hours) at 4/4/2022 1130  Last data filed at 4/4/2022 1004  Gross per 24 hour   Intake 720 ml   Output 1350 ml   Net -630 ml     I/O last 3 completed shifts: In: 80 [P.O.:410]  Out: 1750 [Urine:1750]    Safety Concerns:     None    Impairments/Disabilities:      None    Nutrition Therapy:  Current Nutrition Therapy:   - Oral Diet:  Carb Control 4 carbs/meal (1800kcals/day) and Low Sodium (2gm)    Routes of Feeding: Oral  Liquids: Thin Liquids  Daily Fluid Restriction: no  Last Modified Barium Swallow with Video (Video Swallowing Test): {Done Not Done PZZS:587370047}    Treatments at the Time of Hospital Discharge:   Respiratory Treatments:   Oxygen Therapy:  is on oxygen at 2 L/min per nasal cannula.   Ventilator:    - No ventilator support    Rehab Therapies: {THERAPEUTIC INTERVENTION:1507178276}  Weight Bearing Status/Restrictions: No weight bearing restrictions  Other Medical Equipment (for information only, NOT a DME order):  hospital bed  Other Treatments:     Patient's personal belongings (please select all that are sent with patient):  None    RN SIGNATURE:  Electronically signed by Anayeli Sullivan RN on 4/4/22 at 5:26 PM EDT    CASE MANAGEMENT/SOCIAL WORK SECTION    Inpatient Status Date: 03/28/2022    Readmission Risk Assessment Score:  Readmission Risk              Risk of Unplanned Readmission:  17           Discharging to Facility/ Agency     [address] [facility] [address]       Phone: [phone]       Fax: [phone]          Dialysis Facility (if applicable) NA  Name:  Address:  Dialysis Schedule:  Phone:  Fax:    / signature: Electronically signed by Shahida Brown RN on 4/4/22 at 3:57 PM EDT    PHYSICIAN SECTION    Prognosis: Fair    Condition at Discharge: Stable    Rehab Potential (if transferring to Rehab): Fair    Recommended Labs or Other Treatments After Discharge: PT/OT    Follow up with GI to schedule EGD in 3 months. Taper off pain meds. Physician Certification: I certify the above information and transfer of Anjelica Campoverde  is necessary for the continuing treatment of the diagnosis listed and that she requires [place] for less 30 days.      Update Admission H&P: No change in H&P    PHYSICIAN SIGNATURE:  Electronically signed by Swati Stout MD on 4/4/22 at 11:31 AM EDT

## 2022-04-04 NOTE — PROGRESS NOTES
RN gave report to ems transport. Questions answered. Patient discharged to [address] [place] [address].

## 2022-04-04 NOTE — PLAN OF CARE
Problem: Pain:  Goal: Pain level will decrease  Description: Pain level will decrease  4/4/2022 0516 by Jonel Bocanegra RN  Outcome: Ongoing      Pt c/o 9/10 pain, given PRN medications with relief. Problem: Skin Integrity:  Goal: Will show no infection signs and symptoms  Description: Will show no infection signs and symptoms  4/4/2022 0516 by Jonel Bocanegra RN  Outcome: Ongoing     Pt encouraged and assisted to reposition in bed frequently. Zinc paste applied to buttocks. Pt checked for incontinence and changes as needed. NO new signs of skin breakdown noted. Problem: Falls - Risk of:  Goal: Will remain free from falls  Description: Will remain free from falls  4/4/2022 0516 by Jonel Bocanegra RN  Outcome: Ongoing      Pt free of falls during shift. Low bed, bed alarm on, belongings and bed side table within reach. Pt calls out appropriately.

## 2022-04-04 NOTE — DISCHARGE SUMMARY
Hospital Medicine Discharge Summary    Patient ID: NITA ALLEN Rehfues      Patient's PCP: No primary care provider on file. Admit Date: 3/28/2022     Discharge Date: 4/4/2022      Admitting Physician: Marii Dejesus MD     Discharge Physician: Yadira Calderon MD     Discharge Diagnoses: Active Hospital Problems    Diagnosis Date Noted    Intractable abdominal pain [R10.9] 03/28/2022       The patient was seen and examined on day of discharge and this discharge summary is in conjunction with any daily progress note from day of discharge. Hospital Course: Patient is a 57-year-old female with history of alcoholic cirrhosis, status post tracheostomy after COVID-19 pneumonia November 2021, diabetes mellitus type 2, depression, obesity came into ER with a complaint of abdominal pain, nausea and vomiting. She reports that she was taking NSAID.       CT abdomen and pelvis showed cirrhotic appearing liver with stigmata of portal hypertension consisting of splenomegaly, small amount of ascites and left upper quadrant varices. Distended gallbladder with cholelithiasis. Admitted with GI consultation     S/p EGD on 3/31 several large but shallow clean-based duodenal ulcers with surrounding edema. 3 clean-based antral ulcers biopsied with easy bleeding and portal gastropathy. The distal esophagus appears edematous and ulcerated possible tongue of Balbuena's. #Abdominal pain secondary to gastric and duodenal ulcers  S/p EGD 3/31 showed duodenal ulcers, gastric ulcers. Change Protonix to 40 twice daily. Will need EGD in 3 months. H.Pylori negative  Ultrasound right upper quadrant cirrhosis, vasculature patent. Cholelithiasis without evidence of acute cholecystitis. Avoid NSAIDs.     #UTI culture showed Klebsiella pneumoniae and Proteus  We will change antibiotics to Rocephin     #Alcoholic liver cirrhosis compensated.    AFP wnl.      #S/P Tracheostomy after COVID Pneumonia in 11/2021     #Thrombocytopenia suspected 2/2 cirrhosis  cont to monitor      Physical Exam Performed:     /71   Pulse 75   Temp 98.8 °F (37.1 °C) (Oral)   Resp 16   Ht 5' 6\" (1.676 m)   Wt 229 lb 15 oz (104.3 kg)   LMP 03/31/1982   SpO2 92%   BMI 37.11 kg/m²       General appearance: ill looking, appears to be older than stated age and cooperative. NAD  HEENT: Pupils equal, round, and reactive to light. Conjunctivae/corneas clear. Neck: Supple, tracheostomy  Respiratory:  Normal respiratory effort. Clear to auscultation, bilaterally without Rales/Wheezes/Rhonchi. Cardiovascular: Regular rate and rhythm with normal S1/S2 without murmurs, rubs or gallops. Abdomen: Soft, non-tender, distended with normal bowel sounds. Musculoskeletal: No clubbing, cyanosis. 1+ edema bilaterally. Full range of motion without deformity. Skin: Skin color, texture, turgor normal.  No rashes or lesions. Neurologic:  Neurovascularly intact without any focal sensory/motor deficits. Cranial nerves: II-XII intact, grossly non-focal.  Psychiatric: Alert and oriented, flat face  Capillary Refill: Brisk,< 3 seconds   Peripheral Pulses: +2 palpable, equal bilaterally     Labs: For convenience and continuity at follow-up the following most recent labs are provided:      CBC:    Lab Results   Component Value Date    WBC 8.4 04/04/2022    HGB 10.9 04/04/2022    HCT 32.2 04/04/2022     04/04/2022       Renal:    Lab Results   Component Value Date     04/04/2022    K 3.3 04/04/2022    CL 96 04/04/2022    CO2 26 04/04/2022    BUN 9 04/04/2022    CREATININE 0.7 04/04/2022    CALCIUM 8.3 04/04/2022         Significant Diagnostic Studies    Radiology:   XR ABDOMEN (KUB) (SINGLE AP VIEW)   Final Result      Gaseous distention of the colon. No small bowel dilatation. US ABDOMEN LIMITED Specify organ? LIVER, GALLBLADDER   Final Result      1. Cirrhosis. 2. Vasculature patent. 3. Cholelithiasis without evidence of acute cholecystitis.       US DUP ABD PEL RETRO SCROT COMPLETE   Final Result      1. Cirrhosis. 2. Vasculature patent. 3. Cholelithiasis without evidence of acute cholecystitis. XR CHEST PORTABLE   Final Result      Diffuse bilateral airspace disease, greater on the left. CT ABDOMEN PELVIS W IV CONTRAST Additional Contrast? None   Final Result   1. Cirrhotic appearing liver with stigmata of portal hypertension consisting of splenomegaly, small amount of ascites and left upper quadrant varices. 2. Distended gallbladder with cholelithiasis. This can be seen with prolonged fasting state or chronic cystic duct obstruction. 3. There is some mild wall thickening of the transverse limb of the duodenum with some adjacent retroperitoneal fat stranding. This may reflect a duodenitis. Alternatively, this appearance may be caused by the duodenum being decompressed. This could be    further evaluated with endoscopy. 4. There is some retroperitoneal fat stranding identified within the right upper quadrant and right mid abdomen. This may be related to cirrhosis and portal hypertension. Reactive edema could result in this appearance in the setting of a duodenitis. Consults:     IP CONSULT TO CASE MANAGEMENT  IP CONSULT TO GI  IP CONSULT TO PHARMACY  IP CONSULT TO SOCIAL WORK    Disposition:  SNF     Condition at Discharge: Stable    Discharge Instructions/Follow-up:  PCP, GI    Code Status:  Prior     Activity: activity as tolerated    Diet: cardiac diet and diabetic diet      Discharge Medications:     Discharge Medication List as of 4/4/2022  5:30 PM           Details   oxyCODONE (ROXICODONE) 5 MG immediate release tablet Take 1 tablet by mouth every 8 hours as needed for Pain for up to 3 days. , Disp-9 tablet, R-0Print              Details   gabapentin (NEURONTIN) 400 MG capsule Take 1 capsule by mouth 3 times daily for 3 doses. , Disp-3 capsule, R-0Print      pantoprazole (PROTONIX) 40 MG tablet Take 1 tablet by mouth in the morning and at bedtime, Disp-60 tablet, R-1DC to SNF              Details   acetaminophen (TYLENOL) 325 MG tablet Take 650 mg by mouth every 4 hours as needed for PainHistorical Med      albuterol (PROVENTIL) (2.5 MG/3ML) 0.083% nebulizer solution Take 2.5 mg by nebulization 4 times dailyHistorical Med      benzonatate (TESSALON) 100 MG capsule Take 100 mg by mouth 3 times daily as needed for CoughHistorical Med      cetirizine (ZYRTEC) 10 MG tablet Take 10 mg by mouth dailyHistorical Med      vitamin D (CHOLECALCIFEROL) 125 MCG (5000 UT) CAPS capsule Take 5,000 Units by mouth dailyHistorical Med      fluticasone (FLONASE) 50 MCG/ACT nasal spray 2 sprays by Each Nostril route dailyHistorical Med      glipiZIDE (GLUCOTROL) 5 MG tablet Take 5 mg by mouth every morning (before breakfast)Historical Med      hydrOXYzine (ATARAX) 25 MG tablet Take 25 mg by mouth every 8 hours as needed for Itching or AnxietyHistorical Med      ipratropium-albuterol (DUONEB) 0.5-2.5 (3) MG/3ML SOLN nebulizer solution Inhale 1 vial into the lungs every 4 hours as needed for Shortness of BreathHistorical Med      lidocaine (LIDODERM) 5 % Place 1 patch onto the skin daily 12 hours on, 12 hours off. Historical Med      melatonin 3 MG TABS tablet Take 3 mg by mouth at bedtimeHistorical Med      metFORMIN (GLUCOPHAGE-XR) 500 MG extended release tablet Take 1,000 mg by mouth in the morning and at bedtimeHistorical Med      metoprolol succinate (TOPROL XL) 25 MG extended release tablet Take 25 mg by mouth dailyHistorical Med      miconazole (MICOTIN) 2 % powder Apply topically 2 times daily Apply topically 2 times daily. , Topical, 2 TIMES DAILY, Historical Med      Multiple Vitamins-Minerals (CENTRUM/CERTA-MARJAN WITH MINERALS ORAL) solution Take 15 mLs by mouth every other dayHistorical Med      naloxone 4 MG/0.1ML LIQD nasal spray 1 spray by Nasal route as needed for Opioid ReversalHistorical Med      QUEtiapine (SEROQUEL) 300 MG tablet Take 300 mg by mouth at bedtimeHistorical Med      spironolactone (ALDACTONE) 50 MG tablet Take 50 mg by mouth dailyHistorical Med      torsemide (DEMADEX) 20 MG tablet Take 20 mg by mouth dailyHistorical Med             Time Spent on discharge is more than 30 minutes in the examination, evaluation, counseling and review of medications and discharge plan. Signed:    Crispin Phelps MD   4/5/2022      Thank you No primary care provider on file. for the opportunity to be involved in this patient's care. If you have any questions or concerns please feel free to contact me at [phone].

## 2022-04-04 NOTE — PROGRESS NOTES
Hospitalist Progress Note      PCP: No primary care provider on file. Date of Admission: 3/28/2022    CC abdominal pain    Hospital course  Patient is a 38-year-old female with history of alcoholic cirrhosis, status post tracheostomy after COVID-19 pneumonia November 2021, diabetes mellitus type 2, depression, obesity came into ER with a complaint of abdominal pain, nausea and vomiting. She reports that she was taking NSAID. CT abdomen and pelvis showed cirrhotic appearing liver with stigmata of portal hypertension consisting of splenomegaly, small amount of ascites and left upper quadrant varices. Distended gallbladder with cholelithiasis. Admitted with GI consultation    S/p EGD on 3/31 several large but shallow clean-based duodenal ulcers with surrounding edema. 3 clean-based antral ulcers biopsied with easy bleeding and portal gastropathy. The distal esophagus appears edematous and ulcerated possible tongue of Balbuena's. Subjective  Patient seen and examined today. Still some abdominal pain. Tolerating diet. Having bowel movement. Denies any nausea, vomiting, fever, chills. Assessment plan  #Abdominal pain secondary to gastric and duodenal ulcers  S/p EGD 3/31 showed duodenal ulcers, gastric ulcers. Change Protonix to 40 twice daily. Will need EGD in 3 months. H.Pylori negative  Ultrasound right upper quadrant cirrhosis, vasculature patent. Cholelithiasis without evidence of acute cholecystitis. Avoid NSAIDs. #UTI culture showed Klebsiella pneumoniae and Proteus  We will change antibiotics to Rocephin    #Alcoholic liver cirrhosis compensated. AFP wnl.      #S/P Tracheostomy after COVID Pneumonia in 11/2021    #Thrombocytopenia suspected 2/2 cirrhosis  cont to monitor        Medications:  Reviewed    Infusion Medications    dextrose      sodium chloride Stopped (03/30/22 1206)    sodium chloride 25 mL (04/04/22 1237)     Scheduled Medications    magnesium sulfate 4,000 mg IntraVENous Once    pantoprazole  40 mg Oral BID AC    metoprolol succinate  25 mg Oral Daily    QUEtiapine  300 mg Oral Nightly    spironolactone  50 mg Oral Daily    torsemide  20 mg Oral Daily    insulin lispro  0-6 Units SubCUTAneous TID WC    insulin lispro  0-3 Units SubCUTAneous Nightly    sodium chloride flush  10 mL IntraVENous 2 times per day    sennosides-docusate sodium  1 tablet Oral BID    enoxaparin  40 mg SubCUTAneous Daily    sodium chloride flush  5-40 mL IntraVENous 2 times per day     PRN Meds: sodium chloride, glucose, glucagon (rDNA), dextrose, oxyCODONE, dextrose bolus (hypoglycemia) **OR** dextrose bolus (hypoglycemia), ipratropium-albuterol, guaiFENesin-dextromethorphan, sodium chloride flush, sodium chloride, promethazine **OR** ondansetron, polyethylene glycol, acetaminophen **OR** acetaminophen, sodium chloride flush, sodium chloride      Intake/Output Summary (Last 24 hours) at 4/4/2022 1330  Last data filed at 4/4/2022 1238  Gross per 24 hour   Intake 720 ml   Output 2350 ml   Net -1630 ml       Physical Exam Performed:    BP (!) 102/52   Pulse 83   Temp 97.9 °F (36.6 °C) (Oral)   Resp 15   Ht 5' 6\" (1.676 m)   Wt 229 lb 15 oz (104.3 kg)   LMP 03/31/1982   SpO2 97%   BMI 37.11 kg/m²     General appearance: ill looking, appears to be older than stated age and cooperative. NAD  HEENT: Pupils equal, round, and reactive to light. Conjunctivae/corneas clear. Neck: Supple, tracheostomy  Respiratory:  Normal respiratory effort. Clear to auscultation, bilaterally without Rales/Wheezes/Rhonchi. Cardiovascular: Regular rate and rhythm with normal S1/S2 without murmurs, rubs or gallops. Abdomen: Soft, non-tender, distended with normal bowel sounds. Musculoskeletal: No clubbing, cyanosis. 1+ edema bilaterally. Full range of motion without deformity. Skin: Skin color, texture, turgor normal.  No rashes or lesions.   Neurologic:  Neurovascularly intact without any focal sensory/motor deficits. Cranial nerves: II-XII intact, grossly non-focal.  Psychiatric: Alert and oriented, flat face  Capillary Refill: Brisk,< 3 seconds   Peripheral Pulses: +2 palpable, equal bilaterally       Labs:   Recent Labs     04/02/22  1001 04/03/22  0727 04/04/22  0556   WBC 9.3 8.9 8.4   HGB 10.5* 10.5* 10.9*   HCT 31.2* 30.3* 32.2*   * 124* 135     Recent Labs     04/02/22  1001 04/03/22  0727 04/04/22  0556   * 134* 134*   K 4.0 3.5 3.3*    95* 96*   CO2 24 27 26   BUN 5* 9 9   CREATININE 0.6 0.8 0.7   CALCIUM 8.5 8.5 8.3     No results for input(s): AST, ALT, BILIDIR, BILITOT, ALKPHOS in the last 72 hours. No results for input(s): INR in the last 72 hours. No results for input(s): Maximino Hug in the last 72 hours. Urinalysis:      Lab Results   Component Value Date    NITRU POSITIVE 03/28/2022    WBCUA 6-9 03/28/2022    BACTERIA 2+ 03/28/2022    RBCUA 5-10 03/28/2022    BLOODU TRACE 03/28/2022    SPECGRAV 1.015 03/28/2022    GLUCOSEU Negative 03/28/2022       Radiology:  XR ABDOMEN (KUB) (SINGLE AP VIEW)   Final Result      Gaseous distention of the colon. No small bowel dilatation. US ABDOMEN LIMITED Specify organ? LIVER, GALLBLADDER   Final Result      1. Cirrhosis. 2. Vasculature patent. 3. Cholelithiasis without evidence of acute cholecystitis. US DUP ABD PEL RETRO SCROT COMPLETE   Final Result      1. Cirrhosis. 2. Vasculature patent. 3. Cholelithiasis without evidence of acute cholecystitis. XR CHEST PORTABLE   Final Result      Diffuse bilateral airspace disease, greater on the left. CT ABDOMEN PELVIS W IV CONTRAST Additional Contrast? None   Final Result   1. Cirrhotic appearing liver with stigmata of portal hypertension consisting of splenomegaly, small amount of ascites and left upper quadrant varices. 2. Distended gallbladder with cholelithiasis.  This can be seen with prolonged fasting state or chronic cystic duct obstruction. 3. There is some mild wall thickening of the transverse limb of the duodenum with some adjacent retroperitoneal fat stranding. This may reflect a duodenitis. Alternatively, this appearance may be caused by the duodenum being decompressed. This could be    further evaluated with endoscopy. 4. There is some retroperitoneal fat stranding identified within the right upper quadrant and right mid abdomen. This may be related to cirrhosis and portal hypertension. Reactive edema could result in this appearance in the setting of a duodenitis. Assessment/Plan:    Active Hospital Problems    Diagnosis Date Noted    Intractable abdominal pain [R10.9] 03/28/2022         DVT Prophylaxis: SCD  Diet: ADULT DIET; Regular; 4 carb choices (60 gm/meal); Low Sodium (2 gm)  Code Status: Full Code    PT/OT Eval Status: in progress    Dispo -. Stable to be discharged. Pending placement. I wrote her prescription for oxycodone and gabapentin as she is going to nursing home. Drug report reviewed.       Kaelyn Carlson MD    This chart was likely completed using voice recognition technology and may contain unintended grammatical , phraseology,and/or punctuation errors

## 2022-04-04 NOTE — CARE COORDINATION
Case Management Assessment            Discharge Note                    Date / Time of Note: 4/4/2022 4:07 PM                  Discharge Note Completed by: Adeola Salgado RN    Patient Name: Vinod Dill   YOB: 1965  Diagnosis: Left flank pain [R10.9]  Nausea vomiting and diarrhea [R11.2, R19.7]  Abdominal pain, unspecified abdominal location [R10.9]  Urinary tract infection without hematuria, site unspecified [N39.0]  Intractable abdominal pain [R10.9]   Date / Time: 3/28/2022  9:37 AM    Current PCP: No primary care provider on file. Clinic patient: No    Hospitalization in the last 30 days: No    Advance Directives:  Code Status: Full Code  [place] DNR form completed and on chart: No    Financial:  Payor: MEDICARE / Plan: MEDICARE PART A AND B / Product Type: *No Product type* /      Pharmacy:    [id number] - [facility], [address] [facility] [phone] - F [phone]  [place] [id number]  Phone: [phone] Fax: [phone]      Assistance purchasing medications?:    Assistance provided by Case Management: None at this time    Does patient want to participate in local refill/ meds to beds program?:      Meds To Beds General Rules:  1. Can ONLY be done Monday- Friday between 8:30am-5pm  2. Prescription(s) must be in pharmacy by 3pm to be filled same day  3. Copy of patient's insurance/ prescription drug card and patient face sheet must be sent along with the prescription(s)  4. Cost of Rx cannot be added to hospital bill. If financial assistance is needed, please contact unit  or ;  or  CANNOT provide pharmacy voucher for patients co-pays  5.  Patients can then  the prescription on their way out of the hospital at discharge, or pharmacy can deliver to the bedside if staff is available. (payment due at time of pick-up or delivery - cash, check, or card accepted)     Able to afford home medications/ co-pay costs: Yes    ADLS:  Current PT AM-PAC Score: 17 /24  Current OT AM-PAC Score: 16 /24      DISCHARGE Disposition:     CM [address] [facility] [address]      REPORT Phone: [phone]      FAX Fax: [phone]            LOC at discharge: Skilled  [address] Completed: Yes    Notification completed in HENS/PAS?:  Yes : CM has completed HENS online through secure website for SNF admission at [facility]  .    Document ID #: Document ID : [id number]    IMM Completed:   Not Indicated    Transportation:  Transportation PLAN for discharge: EMS transportation   Mode of Transport: Ambulance stretcher - BLS  Reason for medical transport: Bed confined: Meets the following criteria 1) unable to get out of bed without assistance or ambulate, 2) unable to safely sit up in a wheelchair, 3) unable to maintain erect seating position in a chair for time needed for transport and Third party assistance/ attendant required to apply, administer or regulate oxygen during transport  Name of [address]: [place] [phone]   Phone: [phone]   Time of Transport: 1900    Transport form completed: Yes    Home Care:  [address] ordered at discharge: Not [address]: Not Applicable  Orders faxed: No    Durable Medical Equipment:  DME Provider: defer  Equipment obtained during hospitalization: defer    Home Oxygen and Respiratory Equipment:  Oxygen needed at discharge?: Yes  [address]: Not Applicable  Portable tank available for discharge?: yes  Per  transport  Dialysis:  Dialysis patient: No    Dialysis Center:  Not Applicable    Hospice Services:  Location: Not Applicable  Agency: Not Applicable    Consents signed: Not Indicated    Referrals made at [facility] for outpatient continued care:  Not Applicable    Additional CM Notes:     CM confirmed  D/c to SNF  :  Patient  Agreeable :    CM to fax  SAVANNAH to [phone] to call report  , No COVID testing needed. No pre cert  Required:  HENS submitted      The Plan for Transition of Care is related to the following treatment goals of Left flank pain [R10.9]  Nausea vomiting and diarrhea [R11.2, R19.7]  Abdominal pain, unspecified abdominal location [R10.9]  Urinary tract infection without hematuria, site unspecified [N39.0]  Intractable abdominal pain [R10.9]    The Patient and/or patient representative NITA and her family were provided with a choice of provider and agrees with the discharge plan Yes    Freedom of choice list was provided with basic dialogue that supports the patient's individualized plan of care/goals and shares the quality data associated with the providers.  Yes    Care Transitions patient: No    Bailey Martínez RN  [facility]  Case Management Department  Ph: [phone]

## 2022-04-04 NOTE — PLAN OF CARE
Problem: Skin Integrity:  Goal: Will show no infection signs and symptoms  Description: Will show no infection signs and symptoms  4/4/2022 0945 by Nora Gallegos RN  Outcome: Ongoing     Problem: Skin Integrity:  Goal: Absence of new skin breakdown  Description: Absence of new skin breakdown  4/4/2022 0945 by Nora Gallegos RN  Outcome: Ongoing

## 2022-04-04 NOTE — DISCHARGE INSTR - DIET
Good nutrition is important when healing from an illness, injury, or surgery. Follow any nutrition recommendations given to you during your hospital stay. If you were given an oral nutrition supplement while in the hospital, continue to take this supplement at home. You can take it with meals, in-between meals, and/or before bedtime. These supplements can be purchased at most local grocery stores, pharmacies, and chain [employer]-stores. If you have any questions about your diet or nutrition, call the hospital and ask for the dietitian.       4 gm carb, low sodium

## 2022-04-06 NOTE — PROGRESS NOTES
Physician Progress Note      PATIENT:               Pippa Watts  CSN #:                  [id number]  :                       1965  ADMIT DATE:       3/28/2022 9:37 AM  [address] DATE:        2022 7:26 PM  RESPONDING  PROVIDER #:        Varinder Mendez MD          QUERY TEXT:    Patient admitted 3/28-  with Abdominal pain (due to gastric/ duodenal   ulcers), and UTI. Sepsis noted in the H&P to 3/31 progress notes, with no   further mention thereafter. If possible, please document in the progress   notes and discharge summary if Sepsis was: The medical record reflects the following:  Risk Factors: UTI  Clinical Indicators: SIRS: T. 101.1, RR: 26- 22, Procal: 0.28. Per H&P   \"Suspected sepsis, unclear source,? Urinary\". Urine cx collected on admit:   Klebsiella pneumoniae & Proteus mirabilis. Sepsis dropped after 3/31. Treatment: Vanc 3/28- 3/30, Cefepime 3/28- , Rocephin - 4/3, 1L LR   bolus, 1L NS bolus, LR @ 250/hr x 1L, Blood/ urine cx's, CXR, CT Abd  Options provided:  -- Sepsis due to UTI treated and resolved  -- Sepsis ruled out after study  -- Other - I will add my own diagnosis  -- Disagree - Not applicable / Not valid  -- Disagree - Clinically unable to determine / Unknown  -- Refer to Clinical Documentation Reviewer    PROVIDER RESPONSE TEXT:    Sepsis due to UTI treated and resolved. Query created by:  Latasha Franks on 2022 7:57 AM      Electronically signed by:  Varinder Mendez MD 2022 7:09 PM

## 2022-04-10 ENCOUNTER — HOSPITAL ENCOUNTER (EMERGENCY)
Age: 57
Discharge: NURSING, REHAB OR LONG-TERM CARE | End: 2022-04-10
Attending: EMERGENCY MEDICINE
Payer: MEDICARE

## 2022-04-10 VITALS
OXYGEN SATURATION: 99 % | DIASTOLIC BLOOD PRESSURE: 78 MMHG | SYSTOLIC BLOOD PRESSURE: 142 MMHG | HEART RATE: 92 BPM | TEMPERATURE: 98.6 F | RESPIRATION RATE: 22 BRPM

## 2022-04-10 DIAGNOSIS — G89.29 ACUTE EXACERBATION OF CHRONIC LOW BACK PAIN: ICD-10-CM

## 2022-04-10 DIAGNOSIS — R10.9 BILATERAL FLANK PAIN: Primary | ICD-10-CM

## 2022-04-10 DIAGNOSIS — M54.50 ACUTE EXACERBATION OF CHRONIC LOW BACK PAIN: ICD-10-CM

## 2022-04-10 LAB
AMORPHOUS: NORMAL /HPF
ANION GAP SERPL CALCULATED.3IONS-SCNC: 17 MMOL/L (ref 3–16)
BASOPHILS ABSOLUTE: 0 K/UL (ref 0–0.2)
BASOPHILS RELATIVE PERCENT: 0.4 %
BILIRUBIN URINE: NEGATIVE
BLOOD, URINE: NEGATIVE
BUN BLDV-MCNC: 17 MG/DL (ref 7–20)
CALCIUM SERPL-MCNC: 9 MG/DL (ref 8.3–10.6)
CHLORIDE BLD-SCNC: 95 MMOL/L (ref 99–110)
CLARITY: CLEAR
CO2: 23 MMOL/L (ref 21–32)
COLOR: YELLOW
CREAT SERPL-MCNC: 0.9 MG/DL (ref 0.6–1.1)
EOSINOPHILS ABSOLUTE: 0.1 K/UL (ref 0–0.6)
EOSINOPHILS RELATIVE PERCENT: 1.3 %
EPITHELIAL CELLS, UA: NORMAL /HPF (ref 0–5)
GFR AFRICAN AMERICAN: >60
GFR NON-AFRICAN AMERICAN: >60
GLUCOSE BLD-MCNC: 82 MG/DL (ref 70–99)
GLUCOSE URINE: NEGATIVE MG/DL
HCT VFR BLD CALC: 35 % (ref 36–48)
HEMOGLOBIN: 11.7 G/DL (ref 12–16)
KETONES, URINE: NEGATIVE MG/DL
LEUKOCYTE ESTERASE, URINE: NEGATIVE
LYMPHOCYTES ABSOLUTE: 1.1 K/UL (ref 1–5.1)
LYMPHOCYTES RELATIVE PERCENT: 12.9 %
MCH RBC QN AUTO: 29.4 PG (ref 26–34)
MCHC RBC AUTO-ENTMCNC: 33.5 G/DL (ref 31–36)
MCV RBC AUTO: 87.8 FL (ref 80–100)
MICROSCOPIC EXAMINATION: NORMAL
MONOCYTES ABSOLUTE: 0.8 K/UL (ref 0–1.3)
MONOCYTES RELATIVE PERCENT: 8.6 %
NEUTROPHILS ABSOLUTE: 6.8 K/UL (ref 1.7–7.7)
NEUTROPHILS RELATIVE PERCENT: 76.8 %
NITRITE, URINE: NEGATIVE
PDW BLD-RTO: 14.6 % (ref 12.4–15.4)
PH UA: 6.5 (ref 5–8)
PLATELET # BLD: 194 K/UL (ref 135–450)
PMV BLD AUTO: 8.1 FL (ref 5–10.5)
POTASSIUM REFLEX MAGNESIUM: 3.6 MMOL/L (ref 3.5–5.1)
PROTEIN UA: NEGATIVE MG/DL
RBC # BLD: 3.99 M/UL (ref 4–5.2)
RBC UA: NORMAL /HPF (ref 0–4)
SODIUM BLD-SCNC: 135 MMOL/L (ref 136–145)
SPECIFIC GRAVITY UA: 1.01 (ref 1–1.03)
URINE TYPE: NORMAL
UROBILINOGEN, URINE: 0.2 E.U./DL
WBC # BLD: 8.8 K/UL (ref 4–11)
WBC UA: NORMAL /HPF (ref 0–5)

## 2022-04-10 PROCEDURE — 80048 BASIC METABOLIC PNL TOTAL CA: CPT

## 2022-04-10 PROCEDURE — 6370000000 HC RX 637 (ALT 250 FOR IP): Performed by: STUDENT IN AN ORGANIZED HEALTH CARE EDUCATION/TRAINING PROGRAM

## 2022-04-10 PROCEDURE — 36415 COLL VENOUS BLD VENIPUNCTURE: CPT

## 2022-04-10 PROCEDURE — 81001 URINALYSIS AUTO W/SCOPE: CPT

## 2022-04-10 PROCEDURE — 96374 THER/PROPH/DIAG INJ IV PUSH: CPT

## 2022-04-10 PROCEDURE — 6360000002 HC RX W HCPCS: Performed by: EMERGENCY MEDICINE

## 2022-04-10 PROCEDURE — 85025 COMPLETE CBC W/AUTO DIFF WBC: CPT

## 2022-04-10 PROCEDURE — 99284 EMERGENCY DEPT VISIT MOD MDM: CPT

## 2022-04-10 RX ORDER — DROPERIDOL 2.5 MG/ML
0.62 INJECTION, SOLUTION INTRAMUSCULAR; INTRAVENOUS ONCE
Status: DISCONTINUED | OUTPATIENT
Start: 2022-04-10 | End: 2022-04-10

## 2022-04-10 RX ORDER — OXYCODONE HYDROCHLORIDE 5 MG/1
5 TABLET ORAL ONCE
Status: COMPLETED | OUTPATIENT
Start: 2022-04-10 | End: 2022-04-10

## 2022-04-10 RX ORDER — DROPERIDOL 2.5 MG/ML
1.25 INJECTION, SOLUTION INTRAMUSCULAR; INTRAVENOUS ONCE
Status: COMPLETED | OUTPATIENT
Start: 2022-04-10 | End: 2022-04-10

## 2022-04-10 RX ORDER — HYDROCODONE BITARTRATE AND ACETAMINOPHEN 5; 325 MG/1; MG/1
1 TABLET ORAL ONCE
Status: COMPLETED | OUTPATIENT
Start: 2022-04-10 | End: 2022-04-10

## 2022-04-10 RX ADMIN — HYDROCODONE BITARTRATE AND ACETAMINOPHEN 1 TABLET: 5; 325 TABLET ORAL at 02:02

## 2022-04-10 RX ADMIN — DROPERIDOL 1.25 MG: 2.5 INJECTION, SOLUTION INTRAMUSCULAR; INTRAVENOUS at 02:03

## 2022-04-10 RX ADMIN — OXYCODONE 5 MG: 5 TABLET ORAL at 03:33

## 2022-04-10 ASSESSMENT — ENCOUNTER SYMPTOMS
NAUSEA: 1
BACK PAIN: 1
WHEEZING: 0
ABDOMINAL PAIN: 0
VOMITING: 0
SHORTNESS OF BREATH: 0

## 2022-04-10 ASSESSMENT — PAIN SCALES - GENERAL
PAINLEVEL_OUTOF10: 9
PAINLEVEL_OUTOF10: 10
PAINLEVEL_OUTOF10: 10

## 2022-04-10 ASSESSMENT — PAIN - FUNCTIONAL ASSESSMENT: PAIN_FUNCTIONAL_ASSESSMENT: 0-10

## 2022-04-10 NOTE — ED NOTES
Attempted report to Valentino Montoya [facility] no answer      Ann-Marie Alicea, RN  04/10/22 [id number]

## 2022-04-10 NOTE — ED NOTES
Nguyen Pena received patient and finally called back for update, report given.      Francisco Osuna RN  04/10/22 6044

## 2022-04-10 NOTE — ED PROVIDER NOTES
ED Attending Attestation Note     Date of evaluation: 4/10/2022    This patient was seen by the resident. I have seen and examined the patient, agree with the workup, evaluation, management and diagnosis. The care plan has been discussed. My assessment reveals complaints of back pain and dysuria. Patient notes symptoms the been going on for several days. On exam, patient has no focal neurologic deficits with reproducible tenderness. Will check laboratory studies, treat symptomatically.      Kristian Plaza MD  04/10/22 7075

## 2022-04-10 NOTE — ED PROVIDER NOTES
[address] [facility]          EM RESIDENT NOTE       Date of evaluation: 4/10/2022    Chief Complaint     Back Pain (states feels like a stabbing sensation across pt. back. States had one episode of vomitting this evening. ) and Urinary Frequency (states has been having increased frequency the past few days. )      History of Present Illness     NITA A Rehfues is a 59 y.o. female with a history of alcoholic cirrhosis, previous COVID pneumonia status post tracheostomy, diabetes, depression, obesity who presents back pain. Patient was recently admitted to the hospital for abdominal pain was found to have multiple ulcers in the gastric and duodenum. During that time, she was also being treated for urinary tract infection patient is representing the emergency department with back pain and urinary frequency. Back pain is located in the bilateral flanks and seems to radiate across the entire back. Is associated with urinary frequency. Everything seems to make her symptoms worse, no alleviating factors. Patient is also complaining of some nausea that has been going on during this time as well but does not describe any episodes of emesis. It is unclear if patient has finished her antibiotic course for urinary tract infection as she cannot tell me if she is still on antibiotics. Patient is denying any new trauma/falls. She denies any fevers, saddle anesthesia, bowel/urinary incontinence or retention, new numbness/tingling or weakness. Review of Systems     Review of Systems   Constitutional: Negative for chills and fever. HENT: Negative for congestion and drooling. Respiratory: Negative for shortness of breath and wheezing. Cardiovascular: Negative for chest pain and palpitations. Gastrointestinal: Positive for nausea. Negative for abdominal pain and vomiting. Genitourinary: Positive for flank pain and frequency. Musculoskeletal: Positive for back pain.  Negative for arthralgias. Skin: Negative for rash and wound. Neurological: Negative for dizziness and headaches. Psychiatric/Behavioral: Negative for agitation and confusion. Past Medical, Surgical, Family, and Social History     She has a past medical history of Hyperlipidemia and Tracheostomy in place [facility]). She has a past surgical history that includes Upper gastrointestinal endoscopy (N/A, 3/31/2022). Her family history is not on file. She reports that she has quit smoking. She has never used smokeless tobacco. She reports previous alcohol use. She reports that she does not use drugs. Medications     Previous Medications    ACETAMINOPHEN (TYLENOL) 325 MG TABLET    Take 650 mg by mouth every 4 hours as needed for Pain    ALBUTEROL (PROVENTIL) (2.5 MG/3ML) 0.083% NEBULIZER SOLUTION    Take 2.5 mg by nebulization 4 times daily    BENZONATATE (TESSALON) 100 MG CAPSULE    Take 100 mg by mouth 3 times daily as needed for Cough    CETIRIZINE (ZYRTEC) 10 MG TABLET    Take 10 mg by mouth daily    FLUTICASONE (FLONASE) 50 MCG/ACT NASAL SPRAY    2 sprays by Each Nostril route daily    GABAPENTIN (NEURONTIN) 400 MG CAPSULE    Take 1 capsule by mouth 3 times daily for 3 doses. GLIPIZIDE (GLUCOTROL) 5 MG TABLET    Take 5 mg by mouth every morning (before breakfast)    HYDROXYZINE (ATARAX) 25 MG TABLET    Take 25 mg by mouth every 8 hours as needed for Itching or Anxiety    IPRATROPIUM-ALBUTEROL (DUONEB) 0.5-2.5 (3) MG/3ML SOLN NEBULIZER SOLUTION    Inhale 1 vial into the lungs every 4 hours as needed for Shortness of Breath    LIDOCAINE (LIDODERM) 5 %    Place 1 patch onto the skin daily 12 hours on, 12 hours off.     MELATONIN 3 MG TABS TABLET    Take 3 mg by mouth at bedtime    METFORMIN (GLUCOPHAGE-XR) 500 MG EXTENDED RELEASE TABLET    Take 1,000 mg by mouth in the morning and at bedtime    METOPROLOL SUCCINATE (TOPROL XL) 25 MG EXTENDED RELEASE TABLET    Take 25 mg by mouth daily    MICONAZOLE (MICOTIN) 2 % POWDER Apply topically 2 times daily Apply topically 2 times daily. MULTIPLE VITAMINS-MINERALS (CENTRUM/CERTA-MARJAN WITH MINERALS ORAL) SOLUTION    Take 15 mLs by mouth every other day    NALOXONE 4 MG/0.1ML LIQD NASAL SPRAY    1 spray by Nasal route as needed for Opioid Reversal    PANTOPRAZOLE (PROTONIX) 40 MG TABLET    Take 1 tablet by mouth in the morning and at bedtime    QUETIAPINE (SEROQUEL) 300 MG TABLET    Take 300 mg by mouth at bedtime    SPIRONOLACTONE (ALDACTONE) 50 MG TABLET    Take 50 mg by mouth daily    TORSEMIDE (DEMADEX) 20 MG TABLET    Take 20 mg by mouth daily    VITAMIN D (CHOLECALCIFEROL) 125 MCG (5000 UT) CAPS CAPSULE    Take 5,000 Units by mouth daily       Allergies     She has No Known Allergies. Physical Exam     INITIAL VITALS: BP: (!) 142/78, Temp: 98.6 °F (37 °C), Pulse: 92, Resp: 22, SpO2: 99 %   Physical Exam  Constitutional:       Appearance: Normal appearance. She is obese. HENT:      Head: Normocephalic and atraumatic. Mouth/Throat:      Mouth: Mucous membranes are moist.      Pharynx: Oropharynx is clear. Eyes:      Extraocular Movements: Extraocular movements intact. Conjunctiva/sclera: Conjunctivae normal.   Cardiovascular:      Rate and Rhythm: Normal rate and regular rhythm. Pulses: Normal pulses. Pulmonary:      Effort: Pulmonary effort is normal.      Breath sounds: Normal breath sounds. Abdominal:      Palpations: Abdomen is soft. Tenderness: There is no abdominal tenderness. There is right CVA tenderness and left CVA tenderness. There is no guarding or rebound. Musculoskeletal:         General: No swelling or tenderness. Normal range of motion. Cervical back: Normal range of motion and neck supple. Skin:     General: Skin is warm and dry. Neurological:      General: No focal deficit present. Mental Status: She is alert and oriented to person, place, and time.    Psychiatric:         Mood and Affect: Mood normal.         Behavior: Behavior normal.         DiagnosticResults     EKG   None     RADIOLOGY:  No orders to display       LABS:   Results for orders placed or performed during the hospital encounter of 04/10/22   CBC with Auto Differential   Result Value Ref Range    WBC 8.8 4.0 - 11.0 K/uL    RBC 3.99 (L) 4.00 - 5.20 M/uL    Hemoglobin 11.7 (L) 12.0 - 16.0 g/dL    Hematocrit 35.0 (L) 36.0 - 48.0 %    MCV 87.8 80.0 - 100.0 fL    MCH 29.4 26.0 - 34.0 pg    MCHC 33.5 31.0 - 36.0 g/dL    RDW 14.6 12.4 - 15.4 %    Platelets 027 350 - 035 K/uL    MPV 8.1 5.0 - 10.5 fL    Neutrophils % 76.8 %    Lymphocytes % 12.9 %    Monocytes % 8.6 %    Eosinophils % 1.3 %    Basophils % 0.4 %    Neutrophils Absolute 6.8 1.7 - 7.7 K/uL    Lymphocytes Absolute 1.1 1.0 - 5.1 K/uL    Monocytes Absolute 0.8 0.0 - 1.3 K/uL    Eosinophils Absolute 0.1 0.0 - 0.6 K/uL    Basophils Absolute 0.0 0.0 - 0.2 K/uL   Basic Metabolic Panel w/ Reflex to MG   Result Value Ref Range    Sodium 135 (L) 136 - 145 mmol/L    Potassium reflex Magnesium 3.6 3.5 - 5.1 mmol/L    Chloride 95 (L) 99 - 110 mmol/L    CO2 23 21 - 32 mmol/L    Anion Gap 17 (H) 3 - 16    Glucose 82 70 - 99 mg/dL    BUN 17 7 - 20 mg/dL    CREATININE 0.9 0.6 - 1.1 mg/dL    GFR Non-African American >60 >60    GFR African American >60 >60    Calcium 9.0 8.3 - 10.6 mg/dL   Urinalysis with Microscopic   Result Value Ref Range    Color, UA Yellow Straw/Yellow    Clarity, UA Clear Clear    Glucose, Ur Negative Negative mg/dL    Bilirubin Urine Negative Negative    Ketones, Urine Negative Negative mg/dL    Specific Gravity, UA 1.010 1.005 - 1.030    Blood, Urine Negative Negative    pH, UA 6.5 5.0 - 8.0    Protein, UA Negative Negative mg/dL    Urobilinogen, Urine 0.2 <2.0 E.U./dL    Nitrite, Urine Negative Negative    Leukocyte Esterase, Urine Negative Negative    Microscopic Examination Not Indicated     Urine Type NotGiven        ED BEDSIDE ULTRASOUND:  None     RECENT VITALS:  BP: (!) 142/78, Temp: 98.6 °F (37 °C), Pulse: 92,Resp: 22, SpO2: 99 %     Procedures     None     ED Course     Nursing Notes, Past Medical Hx, Past Surgical Hx, Social Hx, Allergies, and Family Hx were reviewed. The patient was given the followingmedications:  Orders Placed This Encounter   Medications    HYDROcodone-acetaminophen (NORCO) 5-325 MG per tablet 1 tablet    DISCONTD: droperidol (INAPSINE) injection 0.625 mg    droperidol (INAPSINE) injection 1.25 mg    oxyCODONE (ROXICODONE) immediate release tablet 5 mg       CONSULTS:  None    MEDICAL Tamiko Job / ASSESSMENT / Maryanna Alpers is a 59 y.o. female presenting into the emergency department with back pain and urinary frequency. On arrival, she was slightly hypertensive and tachypneic but was otherwise afebrile with a normal heart rate. Began symptomatic control with 1.25 mg of droperidol and a dose of Norco while labs were being obtained. CBC with no leukocytosis and improved anemia at 11.7. BMP with no gross renal dysfunction or electrolyte derangements. Urinalysis with no signs of infection. On reassessment, the patient was sitting comfortably at bedside with still complaining of back pain. Exam otherwise unchanged she appears to have bilateral CVA tenderness. There is overall soft with no tenderness palpation in all 4 quadrants. Patient states that this back pain has been ongoing since her recent hospitalization and she has been taking oxycodone with relief at her facility. They have ran out of her oxycodone and cannot fill until Monday and that is why her back pain is now flaring up. I did inform her that she received a Norco that she states helped with her pain in the past.  When she confirmed this, she stated that the oxycodone has been the new medication has been helping with her pain. For that reason she will get a single dose of oxycodone to bridge her to her prescription being refilled in 24 hours.   Currently have a low concern for spinous process of her back pain and include fracture, dislocation, epidural abscess, cauda equina. I also have low concern for intra-abdominal pathology to explain her symptomatology to include pyelonephritis, nephrolithiasis, abscess, small bowel obstruction, referred pain from appendicitis/cholecystitis. For that reason will defer additional labs or imaging and plan to obtain symptomatic control prior to the patient being discharged back to her facility. This patient was also evaluated by the attending physician. All care plans werediscussed and agreed upon. Clinical Impression     1. Bilateral flank pain    2. Acute exacerbation of chronic low back pain        Disposition     PATIENT REFERRED TO:  No follow-up provider specified.     DISCHARGE MEDICATIONS:  New Prescriptions    No medications on file       Jaspreet Parikh MD  Resident  04/10/22 [id number]

## 2022-05-05 ENCOUNTER — HOSPITAL ENCOUNTER (INPATIENT)
Age: 57
LOS: 12 days | Discharge: NURSING, REHAB OR LONG-TERM CARE | DRG: 441 | End: 2022-05-17
Attending: EMERGENCY MEDICINE | Admitting: INTERNAL MEDICINE
Payer: MEDICARE

## 2022-05-05 ENCOUNTER — APPOINTMENT (OUTPATIENT)
Dept: GENERAL RADIOLOGY | Age: 57
DRG: 441 | End: 2022-05-05
Payer: MEDICARE

## 2022-05-05 ENCOUNTER — APPOINTMENT (OUTPATIENT)
Dept: CT IMAGING | Age: 57
DRG: 441 | End: 2022-05-05
Payer: MEDICARE

## 2022-05-05 DIAGNOSIS — R41.82 ALTERED MENTAL STATUS, UNSPECIFIED ALTERED MENTAL STATUS TYPE: Primary | ICD-10-CM

## 2022-05-05 DIAGNOSIS — M54.50 LOW BACK PAIN WITHOUT SCIATICA, UNSPECIFIED BACK PAIN LATERALITY, UNSPECIFIED CHRONICITY: ICD-10-CM

## 2022-05-05 DIAGNOSIS — N17.9 AKI (ACUTE KIDNEY INJURY) (HCC): ICD-10-CM

## 2022-05-05 DIAGNOSIS — N39.0 URINARY TRACT INFECTION WITHOUT HEMATURIA, SITE UNSPECIFIED: ICD-10-CM

## 2022-05-05 PROBLEM — G93.41 ACUTE METABOLIC ENCEPHALOPATHY: Status: ACTIVE | Noted: 2022-05-05

## 2022-05-05 PROBLEM — E72.20 HYPERAMMONEMIA (HCC): Status: ACTIVE | Noted: 2022-05-05

## 2022-05-05 PROBLEM — E87.1 HYPONATREMIA: Status: ACTIVE | Noted: 2022-05-05

## 2022-05-05 PROBLEM — R79.89 AZOTEMIA: Status: ACTIVE | Noted: 2022-05-05

## 2022-05-05 LAB
ACETAMINOPHEN LEVEL: <5 UG/ML (ref 10–30)
ALBUMIN SERPL-MCNC: 3.3 G/DL (ref 3.4–5)
ALP BLD-CCNC: 157 U/L (ref 40–129)
ALT SERPL-CCNC: 12 U/L (ref 10–40)
AMMONIA: 93 UMOL/L (ref 11–51)
AMPHETAMINE SCREEN, URINE: ABNORMAL
ANION GAP SERPL CALCULATED.3IONS-SCNC: 19 MMOL/L (ref 3–16)
ANION GAP SERPL CALCULATED.3IONS-SCNC: 20 MMOL/L (ref 3–16)
AST SERPL-CCNC: 70 U/L (ref 15–37)
BACTERIA: ABNORMAL /HPF
BARBITURATE SCREEN URINE: ABNORMAL
BASE EXCESS VENOUS: -2.3 MMOL/L (ref -2–3)
BASOPHILS ABSOLUTE: 0 K/UL (ref 0–0.2)
BASOPHILS RELATIVE PERCENT: 0.2 %
BENZODIAZEPINE SCREEN, URINE: ABNORMAL
BILIRUB SERPL-MCNC: 1.5 MG/DL (ref 0–1)
BILIRUBIN DIRECT: 0.3 MG/DL (ref 0–0.3)
BILIRUBIN URINE: NEGATIVE
BILIRUBIN, INDIRECT: 1.2 MG/DL (ref 0–1)
BLOOD, URINE: NEGATIVE
BUN BLDV-MCNC: 61 MG/DL (ref 7–20)
BUN BLDV-MCNC: 61 MG/DL (ref 7–20)
CALCIUM SERPL-MCNC: 9.5 MG/DL (ref 8.3–10.6)
CALCIUM SERPL-MCNC: 9.8 MG/DL (ref 8.3–10.6)
CANNABINOID SCREEN URINE: POSITIVE
CARBOXYHEMOGLOBIN: 1 % (ref 0–1.5)
CHLORIDE BLD-SCNC: 89 MMOL/L (ref 99–110)
CHLORIDE BLD-SCNC: 91 MMOL/L (ref 99–110)
CLARITY: CLEAR
CO2: 16 MMOL/L (ref 21–32)
CO2: 18 MMOL/L (ref 21–32)
COCAINE METABOLITE SCREEN URINE: ABNORMAL
COLOR: YELLOW
CREAT SERPL-MCNC: 2.1 MG/DL (ref 0.6–1.1)
CREAT SERPL-MCNC: 2.1 MG/DL (ref 0.6–1.1)
EKG ATRIAL RATE: 59 BPM
EKG DIAGNOSIS: NORMAL
EKG P AXIS: 43 DEGREES
EKG P-R INTERVAL: 154 MS
EKG Q-T INTERVAL: 454 MS
EKG QRS DURATION: 90 MS
EKG QTC CALCULATION (BAZETT): 449 MS
EKG R AXIS: -10 DEGREES
EKG T AXIS: 46 DEGREES
EKG VENTRICULAR RATE: 59 BPM
EOSINOPHILS ABSOLUTE: 0.1 K/UL (ref 0–0.6)
EOSINOPHILS RELATIVE PERCENT: 0.6 %
EPITHELIAL CELLS, UA: ABNORMAL /HPF (ref 0–5)
ETHANOL: NORMAL MG/DL (ref 0–0.08)
GFR AFRICAN AMERICAN: 29
GFR AFRICAN AMERICAN: 29
GFR NON-AFRICAN AMERICAN: 24
GFR NON-AFRICAN AMERICAN: 24
GLUCOSE BLD-MCNC: 129 MG/DL (ref 70–99)
GLUCOSE BLD-MCNC: 139 MG/DL (ref 70–99)
GLUCOSE BLD-MCNC: 177 MG/DL (ref 70–99)
GLUCOSE BLD-MCNC: 99 MG/DL (ref 70–99)
GLUCOSE URINE: NEGATIVE MG/DL
HCO3 VENOUS: 20.8 MMOL/L (ref 24–28)
HCT VFR BLD CALC: 39.8 % (ref 36–48)
HEMOGLOBIN, VEN, REDUCED: 11.1 %
HEMOGLOBIN: 13.2 G/DL (ref 12–16)
HYALINE CASTS: ABNORMAL /LPF (ref 0–2)
INR BLD: 2.01 (ref 0.88–1.12)
KETONES, URINE: NEGATIVE MG/DL
LACTIC ACID: 3.7 MMOL/L (ref 0.4–2)
LEUKOCYTE ESTERASE, URINE: ABNORMAL
LYMPHOCYTES ABSOLUTE: 0.8 K/UL (ref 1–5.1)
LYMPHOCYTES RELATIVE PERCENT: 7.6 %
Lab: ABNORMAL
MCH RBC QN AUTO: 28.8 PG (ref 26–34)
MCHC RBC AUTO-ENTMCNC: 33.2 G/DL (ref 31–36)
MCV RBC AUTO: 86.7 FL (ref 80–100)
METHADONE SCREEN, URINE: ABNORMAL
METHEMOGLOBIN VENOUS: 0.1 % (ref 0–1.5)
MICROSCOPIC EXAMINATION: YES
MONOCYTES ABSOLUTE: 0.8 K/UL (ref 0–1.3)
MONOCYTES RELATIVE PERCENT: 7 %
NEUTROPHILS ABSOLUTE: 9.3 K/UL (ref 1.7–7.7)
NEUTROPHILS RELATIVE PERCENT: 84.6 %
NITRITE, URINE: POSITIVE
O2 SAT, VEN: 89 %
OPIATE SCREEN URINE: ABNORMAL
OXYCODONE URINE: ABNORMAL
PCO2, VEN: 30.5 MMHG (ref 41–51)
PDW BLD-RTO: 14.7 % (ref 12.4–15.4)
PERFORMED ON: ABNORMAL
PERFORMED ON: NORMAL
PH UA: 5
PH UA: 6 (ref 5–8)
PH VENOUS: 7.44 (ref 7.35–7.45)
PHENCYCLIDINE SCREEN URINE: ABNORMAL
PLATELET # BLD: 238 K/UL (ref 135–450)
PMV BLD AUTO: 8.1 FL (ref 5–10.5)
PO2, VEN: 55.3 MMHG (ref 25–40)
POTASSIUM REFLEX MAGNESIUM: 9 MMOL/L (ref 3.5–5.1)
POTASSIUM SERPL-SCNC: 4.4 MMOL/L (ref 3.5–5.1)
PROPOXYPHENE SCREEN: ABNORMAL
PROTEIN UA: NEGATIVE MG/DL
PROTHROMBIN TIME: 23.4 SEC (ref 9.9–12.7)
RBC # BLD: 4.59 M/UL (ref 4–5.2)
RBC UA: ABNORMAL /HPF (ref 0–4)
SALICYLATE, SERUM: <0.3 MG/DL (ref 15–30)
SODIUM BLD-SCNC: 124 MMOL/L (ref 136–145)
SODIUM BLD-SCNC: 129 MMOL/L (ref 136–145)
SPECIFIC GRAVITY UA: 1.01 (ref 1–1.03)
TCO2 CALC VENOUS: 22 MMOL/L
TOTAL PROTEIN: 9.7 G/DL (ref 6.4–8.2)
URINE TYPE: ABNORMAL
UROBILINOGEN, URINE: 0.2 E.U./DL
WBC # BLD: 11 K/UL (ref 4–11)
WBC UA: ABNORMAL /HPF (ref 0–5)

## 2022-05-05 PROCEDURE — 6370000000 HC RX 637 (ALT 250 FOR IP): Performed by: INTERNAL MEDICINE

## 2022-05-05 PROCEDURE — 96374 THER/PROPH/DIAG INJ IV PUSH: CPT

## 2022-05-05 PROCEDURE — 87086 URINE CULTURE/COLONY COUNT: CPT

## 2022-05-05 PROCEDURE — 80307 DRUG TEST PRSMV CHEM ANLYZR: CPT

## 2022-05-05 PROCEDURE — 85025 COMPLETE CBC W/AUTO DIFF WBC: CPT

## 2022-05-05 PROCEDURE — 83605 ASSAY OF LACTIC ACID: CPT

## 2022-05-05 PROCEDURE — 82077 ASSAY SPEC XCP UR&BREATH IA: CPT

## 2022-05-05 PROCEDURE — 80179 DRUG ASSAY SALICYLATE: CPT

## 2022-05-05 PROCEDURE — 99223 1ST HOSP IP/OBS HIGH 75: CPT | Performed by: PSYCHIATRY & NEUROLOGY

## 2022-05-05 PROCEDURE — 2580000003 HC RX 258: Performed by: STUDENT IN AN ORGANIZED HEALTH CARE EDUCATION/TRAINING PROGRAM

## 2022-05-05 PROCEDURE — 71045 X-RAY EXAM CHEST 1 VIEW: CPT

## 2022-05-05 PROCEDURE — 94761 N-INVAS EAR/PLS OXIMETRY MLT: CPT

## 2022-05-05 PROCEDURE — C9113 INJ PANTOPRAZOLE SODIUM, VIA: HCPCS | Performed by: INTERNAL MEDICINE

## 2022-05-05 PROCEDURE — 6360000002 HC RX W HCPCS: Performed by: INTERNAL MEDICINE

## 2022-05-05 PROCEDURE — 70450 CT HEAD/BRAIN W/O DYE: CPT

## 2022-05-05 PROCEDURE — 82140 ASSAY OF AMMONIA: CPT

## 2022-05-05 PROCEDURE — 80076 HEPATIC FUNCTION PANEL: CPT

## 2022-05-05 PROCEDURE — 87040 BLOOD CULTURE FOR BACTERIA: CPT

## 2022-05-05 PROCEDURE — 96375 TX/PRO/DX INJ NEW DRUG ADDON: CPT

## 2022-05-05 PROCEDURE — 80143 DRUG ASSAY ACETAMINOPHEN: CPT

## 2022-05-05 PROCEDURE — 87150 DNA/RNA AMPLIFIED PROBE: CPT

## 2022-05-05 PROCEDURE — 99223 1ST HOSP IP/OBS HIGH 75: CPT | Performed by: INTERNAL MEDICINE

## 2022-05-05 PROCEDURE — 99285 EMERGENCY DEPT VISIT HI MDM: CPT

## 2022-05-05 PROCEDURE — 81001 URINALYSIS AUTO W/SCOPE: CPT

## 2022-05-05 PROCEDURE — 2580000003 HC RX 258: Performed by: INTERNAL MEDICINE

## 2022-05-05 PROCEDURE — 36415 COLL VENOUS BLD VENIPUNCTURE: CPT

## 2022-05-05 PROCEDURE — 80048 BASIC METABOLIC PNL TOTAL CA: CPT

## 2022-05-05 PROCEDURE — 6370000000 HC RX 637 (ALT 250 FOR IP): Performed by: STUDENT IN AN ORGANIZED HEALTH CARE EDUCATION/TRAINING PROGRAM

## 2022-05-05 PROCEDURE — 2700000000 HC OXYGEN THERAPY PER DAY

## 2022-05-05 PROCEDURE — 93005 ELECTROCARDIOGRAM TRACING: CPT | Performed by: STUDENT IN AN ORGANIZED HEALTH CARE EDUCATION/TRAINING PROGRAM

## 2022-05-05 PROCEDURE — 6360000002 HC RX W HCPCS: Performed by: STUDENT IN AN ORGANIZED HEALTH CARE EDUCATION/TRAINING PROGRAM

## 2022-05-05 PROCEDURE — 87077 CULTURE AEROBIC IDENTIFY: CPT

## 2022-05-05 PROCEDURE — 82803 BLOOD GASES ANY COMBINATION: CPT

## 2022-05-05 PROCEDURE — APPNB45 APP NON BILLABLE 31-45 MINUTES: Performed by: NURSE PRACTITIONER

## 2022-05-05 PROCEDURE — 1200000000 HC SEMI PRIVATE

## 2022-05-05 PROCEDURE — 85610 PROTHROMBIN TIME: CPT

## 2022-05-05 PROCEDURE — 87186 SC STD MICRODIL/AGAR DIL: CPT

## 2022-05-05 RX ORDER — LIDOCAINE 4 G/G
1 PATCH TOPICAL DAILY
Status: DISCONTINUED | OUTPATIENT
Start: 2022-05-06 | End: 2022-05-05 | Stop reason: SDUPTHER

## 2022-05-05 RX ORDER — GABAPENTIN 100 MG/1
200 CAPSULE ORAL 3 TIMES DAILY
Status: DISCONTINUED | OUTPATIENT
Start: 2022-05-05 | End: 2022-05-06

## 2022-05-05 RX ORDER — ONDANSETRON 2 MG/ML
4 INJECTION INTRAMUSCULAR; INTRAVENOUS ONCE
Status: COMPLETED | OUTPATIENT
Start: 2022-05-05 | End: 2022-05-05

## 2022-05-05 RX ORDER — ENOXAPARIN SODIUM 100 MG/ML
40 INJECTION SUBCUTANEOUS DAILY
Status: DISCONTINUED | OUTPATIENT
Start: 2022-05-06 | End: 2022-05-17 | Stop reason: HOSPADM

## 2022-05-05 RX ORDER — MULTIVIT-MIN/FERROUS GLUCONATE 9 MG/15 ML
15 LIQUID (ML) ORAL DAILY
Status: DISCONTINUED | OUTPATIENT
Start: 2022-05-06 | End: 2022-05-17 | Stop reason: HOSPADM

## 2022-05-05 RX ORDER — INSULIN LISPRO 100 [IU]/ML
0-12 INJECTION, SOLUTION INTRAVENOUS; SUBCUTANEOUS
Status: DISCONTINUED | OUTPATIENT
Start: 2022-05-05 | End: 2022-05-17 | Stop reason: HOSPADM

## 2022-05-05 RX ORDER — LACTULOSE 10 G/15ML
20 SOLUTION ORAL 3 TIMES DAILY
Status: DISCONTINUED | OUTPATIENT
Start: 2022-05-05 | End: 2022-05-06

## 2022-05-05 RX ORDER — SODIUM CHLORIDE, SODIUM LACTATE, POTASSIUM CHLORIDE, AND CALCIUM CHLORIDE .6; .31; .03; .02 G/100ML; G/100ML; G/100ML; G/100ML
1000 INJECTION, SOLUTION INTRAVENOUS ONCE
Status: COMPLETED | OUTPATIENT
Start: 2022-05-05 | End: 2022-05-05

## 2022-05-05 RX ORDER — IPRATROPIUM BROMIDE AND ALBUTEROL SULFATE 2.5; .5 MG/3ML; MG/3ML
1 SOLUTION RESPIRATORY (INHALATION) EVERY 4 HOURS PRN
Status: DISCONTINUED | OUTPATIENT
Start: 2022-05-05 | End: 2022-05-17 | Stop reason: HOSPADM

## 2022-05-05 RX ORDER — SODIUM CHLORIDE 0.9 % (FLUSH) 0.9 %
5-40 SYRINGE (ML) INJECTION EVERY 12 HOURS SCHEDULED
Status: DISCONTINUED | OUTPATIENT
Start: 2022-05-05 | End: 2022-05-17 | Stop reason: HOSPADM

## 2022-05-05 RX ORDER — LIDOCAINE 4 G/G
1 PATCH TOPICAL DAILY
Status: DISCONTINUED | OUTPATIENT
Start: 2022-05-05 | End: 2022-05-17 | Stop reason: HOSPADM

## 2022-05-05 RX ORDER — ACETAMINOPHEN 325 MG/1
650 TABLET ORAL EVERY 6 HOURS PRN
Status: DISCONTINUED | OUTPATIENT
Start: 2022-05-05 | End: 2022-05-05 | Stop reason: SDUPTHER

## 2022-05-05 RX ORDER — MIDODRINE HYDROCHLORIDE 5 MG/1
5 TABLET ORAL
Status: DISCONTINUED | OUTPATIENT
Start: 2022-05-06 | End: 2022-05-09

## 2022-05-05 RX ORDER — DEXTROSE MONOHYDRATE 50 MG/ML
100 INJECTION, SOLUTION INTRAVENOUS PRN
Status: DISCONTINUED | OUTPATIENT
Start: 2022-05-05 | End: 2022-05-17 | Stop reason: HOSPADM

## 2022-05-05 RX ORDER — SODIUM CHLORIDE 0.9 % (FLUSH) 0.9 %
5-40 SYRINGE (ML) INJECTION PRN
Status: DISCONTINUED | OUTPATIENT
Start: 2022-05-05 | End: 2022-05-17 | Stop reason: HOSPADM

## 2022-05-05 RX ORDER — ONDANSETRON 2 MG/ML
4 INJECTION INTRAMUSCULAR; INTRAVENOUS EVERY 6 HOURS PRN
Status: DISCONTINUED | OUTPATIENT
Start: 2022-05-05 | End: 2022-05-17 | Stop reason: HOSPADM

## 2022-05-05 RX ORDER — SUCRALFATE 1 G/1
1 TABLET ORAL EVERY 6 HOURS SCHEDULED
Status: DISCONTINUED | OUTPATIENT
Start: 2022-05-05 | End: 2022-05-06

## 2022-05-05 RX ORDER — LANOLIN ALCOHOL/MO/W.PET/CERES
3 CREAM (GRAM) TOPICAL NIGHTLY
Status: DISCONTINUED | OUTPATIENT
Start: 2022-05-05 | End: 2022-05-06

## 2022-05-05 RX ORDER — PANTOPRAZOLE SODIUM 40 MG/10ML
40 INJECTION, POWDER, LYOPHILIZED, FOR SOLUTION INTRAVENOUS 2 TIMES DAILY
Status: DISCONTINUED | OUTPATIENT
Start: 2022-05-05 | End: 2022-05-17

## 2022-05-05 RX ORDER — LORAZEPAM 2 MG/ML
0.5 INJECTION INTRAMUSCULAR ONCE
Status: COMPLETED | OUTPATIENT
Start: 2022-05-05 | End: 2022-05-05

## 2022-05-05 RX ORDER — METOPROLOL SUCCINATE 25 MG/1
25 TABLET, EXTENDED RELEASE ORAL DAILY
Status: DISCONTINUED | OUTPATIENT
Start: 2022-05-06 | End: 2022-05-17 | Stop reason: HOSPADM

## 2022-05-05 RX ORDER — ACETAMINOPHEN 650 MG/1
650 SUPPOSITORY RECTAL EVERY 6 HOURS PRN
Status: DISCONTINUED | OUTPATIENT
Start: 2022-05-05 | End: 2022-05-06

## 2022-05-05 RX ORDER — PANTOPRAZOLE SODIUM 40 MG/1
40 TABLET, DELAYED RELEASE ORAL 2 TIMES DAILY
Status: CANCELLED | OUTPATIENT
Start: 2022-05-05

## 2022-05-05 RX ORDER — OCTREOTIDE ACETATE 100 UG/ML
150 INJECTION, SOLUTION INTRAVENOUS; SUBCUTANEOUS EVERY 8 HOURS
Status: DISCONTINUED | OUTPATIENT
Start: 2022-05-06 | End: 2022-05-09

## 2022-05-05 RX ORDER — POLYETHYLENE GLYCOL 3350 17 G/17G
17 POWDER, FOR SOLUTION ORAL DAILY PRN
Status: DISCONTINUED | OUTPATIENT
Start: 2022-05-05 | End: 2022-05-17 | Stop reason: HOSPADM

## 2022-05-05 RX ORDER — INSULIN LISPRO 100 [IU]/ML
0-6 INJECTION, SOLUTION INTRAVENOUS; SUBCUTANEOUS NIGHTLY
Status: DISCONTINUED | OUTPATIENT
Start: 2022-05-05 | End: 2022-05-17 | Stop reason: HOSPADM

## 2022-05-05 RX ORDER — QUETIAPINE FUMARATE 100 MG/1
100 TABLET, FILM COATED ORAL NIGHTLY
Status: DISCONTINUED | OUTPATIENT
Start: 2022-05-05 | End: 2022-05-06

## 2022-05-05 RX ORDER — CETIRIZINE HYDROCHLORIDE 10 MG/1
10 TABLET ORAL DAILY
Status: DISCONTINUED | OUTPATIENT
Start: 2022-05-06 | End: 2022-05-06

## 2022-05-05 RX ORDER — SODIUM CHLORIDE 9 MG/ML
INJECTION, SOLUTION INTRAVENOUS PRN
Status: DISCONTINUED | OUTPATIENT
Start: 2022-05-05 | End: 2022-05-17 | Stop reason: HOSPADM

## 2022-05-05 RX ORDER — ALBUMIN (HUMAN) 12.5 G/50ML
25 SOLUTION INTRAVENOUS EVERY 6 HOURS
Status: COMPLETED | OUTPATIENT
Start: 2022-05-05 | End: 2022-05-06

## 2022-05-05 RX ORDER — ACETAMINOPHEN 325 MG/1
650 TABLET ORAL EVERY 6 HOURS PRN
Status: DISCONTINUED | OUTPATIENT
Start: 2022-05-05 | End: 2022-05-06

## 2022-05-05 RX ORDER — ONDANSETRON 4 MG/1
4 TABLET, ORALLY DISINTEGRATING ORAL EVERY 8 HOURS PRN
Status: DISCONTINUED | OUTPATIENT
Start: 2022-05-05 | End: 2022-05-17 | Stop reason: HOSPADM

## 2022-05-05 RX ADMIN — GABAPENTIN 200 MG: 100 CAPSULE ORAL at 21:32

## 2022-05-05 RX ADMIN — LACTULOSE 20 G: 20 SOLUTION ORAL at 18:28

## 2022-05-05 RX ADMIN — CEFTRIAXONE 1000 MG: 1 INJECTION, POWDER, FOR SOLUTION INTRAMUSCULAR; INTRAVENOUS at 13:56

## 2022-05-05 RX ADMIN — RIFAXIMIN 550 MG: 550 TABLET ORAL at 18:36

## 2022-05-05 RX ADMIN — INSULIN LISPRO 2 UNITS: 100 INJECTION, SOLUTION INTRAVENOUS; SUBCUTANEOUS at 19:11

## 2022-05-05 RX ADMIN — HYDROMORPHONE HYDROCHLORIDE 0.5 MG: 1 INJECTION, SOLUTION INTRAMUSCULAR; INTRAVENOUS; SUBCUTANEOUS at 13:55

## 2022-05-05 RX ADMIN — RIFAXIMIN 550 MG: 550 TABLET ORAL at 21:32

## 2022-05-05 RX ADMIN — GABAPENTIN 200 MG: 100 CAPSULE ORAL at 18:36

## 2022-05-05 RX ADMIN — PANTOPRAZOLE SODIUM 40 MG: 40 INJECTION, POWDER, FOR SOLUTION INTRAVENOUS at 21:32

## 2022-05-05 RX ADMIN — SODIUM CHLORIDE, POTASSIUM CHLORIDE, SODIUM LACTATE AND CALCIUM CHLORIDE 1000 ML: 600; 310; 30; 20 INJECTION, SOLUTION INTRAVENOUS at 12:27

## 2022-05-05 RX ADMIN — LORAZEPAM 0.5 MG: 2 INJECTION INTRAMUSCULAR; INTRAVENOUS at 13:20

## 2022-05-05 RX ADMIN — LACTULOSE 20 G: 20 SOLUTION ORAL at 21:32

## 2022-05-05 RX ADMIN — SODIUM CHLORIDE, PRESERVATIVE FREE 10 ML: 5 INJECTION INTRAVENOUS at 21:31

## 2022-05-05 RX ADMIN — ONDANSETRON 4 MG: 2 INJECTION INTRAMUSCULAR; INTRAVENOUS at 12:28

## 2022-05-05 ASSESSMENT — PAIN DESCRIPTION - DESCRIPTORS: DESCRIPTORS: SHARP;STABBING

## 2022-05-05 ASSESSMENT — ENCOUNTER SYMPTOMS
SHORTNESS OF BREATH: 0
BACK PAIN: 1
DIARRHEA: 0
EYE DISCHARGE: 0
VOMITING: 0
COUGH: 0
RHINORRHEA: 0
ABDOMINAL PAIN: 1
EYE REDNESS: 0

## 2022-05-05 ASSESSMENT — PAIN DESCRIPTION - ORIENTATION: ORIENTATION: LOWER

## 2022-05-05 ASSESSMENT — PAIN - FUNCTIONAL ASSESSMENT: PAIN_FUNCTIONAL_ASSESSMENT: 0-10

## 2022-05-05 ASSESSMENT — PAIN DESCRIPTION - PAIN TYPE: TYPE: ACUTE PAIN

## 2022-05-05 ASSESSMENT — PAIN DESCRIPTION - FREQUENCY: FREQUENCY: CONTINUOUS

## 2022-05-05 ASSESSMENT — PAIN SCALES - GENERAL: PAINLEVEL_OUTOF10: 9

## 2022-05-05 ASSESSMENT — PAIN DESCRIPTION - LOCATION
LOCATION: ABDOMEN;BACK
LOCATION: BACK;ABDOMEN

## 2022-05-05 NOTE — ED NOTES
Patient very restless in the bed, turning side to side and complaining constantly about stabbing pain in lower back. Lidoderm patch intact to this area as ordered and ice pack provided also. No relief from either of these interventions.   [place] [employer] notified and patient given ativan 0.5mg IVP as ordered     Mirela Ingram RN  05/05/22 [id number]

## 2022-05-05 NOTE — ED NOTES
Received call from nurse Leatha Xiao at [facility]. She said that for about 5 weeks, patient has been having visits from a particular person . After the visitor leaves patient is consistently very difficult to arouse. VS are stable so no intervention was ever necessary. At one point patients visits were suspended and a UDS was done. When the UDS was negative, visits were allowed again. This morning visitor came in at 12, afterwards patient was very difficult to arouse and narcan was given, patient immediately came around and began behaving restless and agitated, as she presented here.   Dr Khari trinidad served to call me back so I can notify her of this phone call  Awaiting call back     Erick So RN  05/05/22 [id number]

## 2022-05-05 NOTE — ED TRIAGE NOTES
From [facility]. Has been very anxious and agitated this am.  C/o severe back and abdominal pain. States that she can't breathe very restless in the bed Sa02 on 3lp[nc is 100%   Per NH report patient had a visitor atr 0300 and they wonder if patient was given some type of medication causing her to behave this way.

## 2022-05-05 NOTE — ED PROVIDER NOTES
[id number] [facility] RESIDENT NOTE       Date of evaluation: 5/5/2022    Chief Complaint     Altered Mental Status and Abdominal Pain      History of Present Illness     NITA Etienne is a 59 y.o. female with a past medical history of alcoholic cirrhosis, COVID-pneumonia complicated by respiratory failure status post tracheostomy (no vent requirement), diabetes, who presents from her living facility with altered mental status and possible ingestion. Patient currently resides in a nursing home [facility]) and was noted to be confused this morning upon awakening. Squad also states that patient's nursing home reported a visitor around 3 AM, with concern for administering Tylenol PM.  Patient admits to taking 2 these, but denies any other ingestions today. Patient reporting pain in her back as well as in her abdomen, she has no shortness of breath or chest pain. No fevers that she is reporting. Review of Systems     Review of Systems   Constitutional: Negative for fever. HENT: Negative for ear discharge and rhinorrhea. Eyes: Negative for discharge and redness. Respiratory: Negative for cough and shortness of breath. Cardiovascular: Negative for chest pain and palpitations. Gastrointestinal: Positive for abdominal pain. Negative for diarrhea and vomiting. Genitourinary: Negative for difficulty urinating and flank pain. Musculoskeletal: Positive for back pain. Negative for joint swelling. Skin: Negative for rash. Neurological: Negative for syncope and headaches. Psychiatric/Behavioral: Positive for agitation and confusion. All other systems reviewed and are negative. Past Medical, Surgical, Family, and Social History     She has a past medical history of CHF (congestive heart failure) ([facility] [address] Hyperlipidemia, Obesity, and Tracheostomy in place [facility]).   She has a past surgical history that includes Upper gastrointestinal endoscopy (N/A, 3/31/2022). Her family history is not on file. She reports that she has quit smoking. She has never used smokeless tobacco. She reports previous alcohol use. She reports that she does not use drugs. Medications     Previous Medications    ACETAMINOPHEN (TYLENOL) 325 MG TABLET    Take 650 mg by mouth every 4 hours as needed for Pain    ALBUTEROL (PROVENTIL) (2.5 MG/3ML) 0.083% NEBULIZER SOLUTION    Take 2.5 mg by nebulization 4 times daily    BENZONATATE (TESSALON) 100 MG CAPSULE    Take 100 mg by mouth 3 times daily as needed for Cough    CETIRIZINE (ZYRTEC) 10 MG TABLET    Take 10 mg by mouth daily    FLUTICASONE (FLONASE) 50 MCG/ACT NASAL SPRAY    2 sprays by Each Nostril route daily    GABAPENTIN (NEURONTIN) 400 MG CAPSULE    Take 1 capsule by mouth 3 times daily for 3 doses. GLIPIZIDE (GLUCOTROL) 5 MG TABLET    Take 5 mg by mouth every morning (before breakfast)    HYDROXYZINE (ATARAX) 25 MG TABLET    Take 25 mg by mouth every 8 hours as needed for Itching or Anxiety    IPRATROPIUM-ALBUTEROL (DUONEB) 0.5-2.5 (3) MG/3ML SOLN NEBULIZER SOLUTION    Inhale 1 vial into the lungs every 4 hours as needed for Shortness of Breath    LIDOCAINE (LIDODERM) 5 %    Place 1 patch onto the skin daily 12 hours on, 12 hours off. MELATONIN 3 MG TABS TABLET    Take 3 mg by mouth at bedtime    METFORMIN (GLUCOPHAGE-XR) 500 MG EXTENDED RELEASE TABLET    Take 1,000 mg by mouth in the morning and at bedtime    METOPROLOL SUCCINATE (TOPROL XL) 25 MG EXTENDED RELEASE TABLET    Take 25 mg by mouth daily    MICONAZOLE (MICOTIN) 2 % POWDER    Apply topically 2 times daily Apply topically 2 times daily.     MULTIPLE VITAMINS-MINERALS (CENTRUM/CERTA-MARJAN WITH MINERALS ORAL) SOLUTION    Take 15 mLs by mouth every other day    NALOXONE 4 MG/0.1ML LIQD NASAL SPRAY    1 spray by Nasal route as needed for Opioid Reversal    PANTOPRAZOLE (PROTONIX) 40 MG TABLET    Take 1 tablet by mouth in the morning and at bedtime    QUETIAPINE (SEROQUEL) 300 MG TABLET    Take 300 mg by mouth at bedtime    SPIRONOLACTONE (ALDACTONE) 50 MG TABLET    Take 50 mg by mouth daily    TORSEMIDE (DEMADEX) 20 MG TABLET    Take 20 mg by mouth daily    VITAMIN D (CHOLECALCIFEROL) 125 MCG (5000 UT) CAPS CAPSULE    Take 5,000 Units by mouth daily       Allergies     She has No Known Allergies. Physical Exam     INITIAL VITALS: BP: (!) 115/54, Temp: 98.4 °F (36.9 °C), Pulse: 58, Resp: 20, SpO2: 100 %   Physical Exam  Vitals and nursing note reviewed. Constitutional:       General: She is not in acute distress. Appearance: She is well-developed. HENT:      Head: Normocephalic and atraumatic. Mouth/Throat:      Mouth: Mucous membranes are dry. Eyes:      Pupils: Pupils are equal, round, and reactive to light. Comments: Pupils dilated, sluggishly reactive bilaterally    Cardiovascular:      Rate and Rhythm: Regular rhythm. Bradycardia present. Heart sounds: No murmur heard. No friction rub. No gallop. Pulmonary:      Effort: Pulmonary effort is normal. No respiratory distress. Breath sounds: Normal breath sounds. No stridor. No wheezing, rhonchi or rales. Chest:      Chest wall: No tenderness. Abdominal:      General: There is no distension. Palpations: Abdomen is soft. Tenderness: There is no abdominal tenderness. There is no right CVA tenderness, left CVA tenderness, guarding or rebound. Musculoskeletal:      Cervical back: Neck supple. Right lower leg: No edema. Comments: Diffuse tenderness throughout bilateral thoracic and lumbar paraspinous region    Skin:     General: Skin is warm and dry. Capillary Refill: Capillary refill takes less than 2 seconds. Findings: No erythema. Neurological:      Comments: Patient able to state her name, the year, but is incorrect on month. She is moving all 4 extremities without difficulty. She has no obvious facial asymmetry.   Unable to cooperate in full neurologic exam.   Psychiatric:      Comments: Patient is restless         DiagnosticResults     EKG   Sinus bradycardia with a rate of 59 bpm.  Normal axis. No ectopy. Normal conduction, normal QRS, normal QTC of 449 ms. Normal ST segments without elevation or depression. Nonspecific T wave flattening in V2 and V3. No changes when compared to prior from 3/28/2022. RADIOLOGY:  XR CHEST PORTABLE   Final Result      No acute cardiopulmonary disease.              LABS:   Results for orders placed or performed during the hospital encounter of 05/05/22   CBC with Auto Differential   Result Value Ref Range    WBC 11.0 4.0 - 11.0 K/uL    RBC 4.59 4.00 - 5.20 M/uL    Hemoglobin 13.2 12.0 - 16.0 g/dL    Hematocrit 39.8 36.0 - 48.0 %    MCV 86.7 80.0 - 100.0 fL    MCH 28.8 26.0 - 34.0 pg    MCHC 33.2 31.0 - 36.0 g/dL    RDW 14.7 12.4 - 15.4 %    Platelets 599 322 - 473 K/uL    MPV 8.1 5.0 - 10.5 fL    Neutrophils % 84.6 %    Lymphocytes % 7.6 %    Monocytes % 7.0 %    Eosinophils % 0.6 %    Basophils % 0.2 %    Neutrophils Absolute 9.3 (H) 1.7 - 7.7 K/uL    Lymphocytes Absolute 0.8 (L) 1.0 - 5.1 K/uL    Monocytes Absolute 0.8 0.0 - 1.3 K/uL    Eosinophils Absolute 0.1 0.0 - 0.6 K/uL    Basophils Absolute 0.0 0.0 - 0.2 K/uL   Basic Metabolic Panel w/ Reflex to MG   Result Value Ref Range    Sodium 124 (L) 136 - 145 mmol/L    Potassium reflex Magnesium 9.0 (HH) 3.5 - 5.1 mmol/L    Chloride 89 (L) 99 - 110 mmol/L    CO2 16 (L) 21 - 32 mmol/L    Anion Gap 19 (H) 3 - 16    Glucose 129 (H) 70 - 99 mg/dL    BUN 61 (H) 7 - 20 mg/dL    CREATININE 2.1 (H) 0.6 - 1.1 mg/dL    GFR Non-African American 24 (A) >60    GFR  29 (A) >60    Calcium 9.5 8.3 - 10.6 mg/dL   Hepatic Function Panel   Result Value Ref Range    Total Protein 9.7 (H) 6.4 - 8.2 g/dL    Albumin 3.3 (L) 3.4 - 5.0 g/dL    Alkaline Phosphatase 157 (H) 40 - 129 U/L    ALT 12 10 - 40 U/L    AST 70 (H) 15 - 37 U/L    Total Bilirubin 1.5 (H) 0.0 - 1.0 mg/dL    Bilirubin, Direct 0.3 0.0 - 0.3 mg/dL    Bilirubin, Indirect 1.2 (H) 0.0 - 1.0 mg/dL   Blood Gas, Venous   Result Value Ref Range    pH, Sam 7.443 7.350 - 7.450    pCO2, Sam 30.5 (L) 41.0 - 51.0 mmHg    pO2, Sam 55.3 (H) 25.0 - 40.0 mmHg    HCO3, Venous 20.8 (L) 24.0 - 28.0 mmol/L    Base Excess, Sam -2.3 (L) -2.0 - 3.0 mmol/L    O2 Sat, Sam 89 Not established %    Carboxyhemoglobin 1.0 0.0 - 1.5 %    MetHgb, Sam 0.1 0.0 - 1.5 %    TC02 (Calc), Sam 22 mmol/L    Hemoglobin, Sam, Reduced 11.10 %   Urinalysis with Microscopic   Result Value Ref Range    Color, UA Yellow Straw/Yellow    Clarity, UA Clear Clear    Glucose, Ur Negative Negative mg/dL    Bilirubin Urine Negative Negative    Ketones, Urine Negative Negative mg/dL    Specific Gravity, UA 1.010 1.005 - 1.030    Blood, Urine Negative Negative    pH, UA 6.0 5.0 - 8.0    Protein, UA Negative Negative mg/dL    Urobilinogen, Urine 0.2 <2.0 E.U./dL    Nitrite, Urine POSITIVE (A) Negative    Leukocyte Esterase, Urine SMALL (A) Negative    Microscopic Examination YES     Urine Type NotGiven     Hyaline Casts, UA 0-2 0 - 2 /LPF    WBC, UA 3-5 0 - 5 /HPF    RBC, UA None seen 0 - 4 /HPF    Epithelial Cells, UA 6-10 (A) 0 - 5 /HPF    Bacteria, UA 4+ (A) None Seen /HPF   Acetaminophen Level   Result Value Ref Range    Acetaminophen Level <5 (L) 10 - 30 ug/mL   Salicylate   Result Value Ref Range    Salicylate, Serum <0.5 (L) 15.0 - 30.0 mg/dL   Ethanol   Result Value Ref Range    Ethanol Lvl None Detected mg/dL   Basic Metabolic Panel   Result Value Ref Range    Sodium 129 (L) 136 - 145 mmol/L    Potassium 4.4 3.5 - 5.1 mmol/L    Chloride 91 (L) 99 - 110 mmol/L    CO2 18 (L) 21 - 32 mmol/L    Anion Gap 20 (H) 3 - 16    Glucose 139 (H) 70 - 99 mg/dL    BUN 61 (H) 7 - 20 mg/dL    CREATININE 2.1 (H) 0.6 - 1.1 mg/dL    GFR Non-African American 24 (A) >60    GFR  29 (A) >60    Calcium 9.8 8.3 - 10.6 mg/dL   EKG 12 Lead   Result Value Ref Range Ventricular Rate 59 BPM    Atrial Rate 59 BPM    P-R Interval 154 ms    QRS Duration 90 ms    Q-T Interval 454 ms    QTc Calculation (Bazett) 449 ms    P Axis 43 degrees    R Axis -10 degrees    T Axis 46 degrees    Diagnosis       EKG performed in ER and to be interpreted by ER physician. Confirmed by MD, ER ([id number]),  Miguelina Min ([id number]) on 5/5/2022 11:50:02 AM       ED BEDSIDE ULTRASOUND:  None     RECENT VITALS:  BP: (!) 115/54, Temp: 98.4 °F (36.9 °C), Pulse: 58,Resp: 20, SpO2: 100 %     Procedures     None     ED Course     Nursing Notes, Past Medical Hx, Past Surgical Hx, Social Hx, Allergies, and Family Hx were reviewed. The patient was given the followingmedications:  Orders Placed This Encounter   Medications    lactated ringers bolus    lidocaine 4 % external patch 1 patch    ondansetron (ZOFRAN) injection 4 mg    cefTRIAXone (ROCEPHIN) 1000 mg IVPB in 50 mL D5W minibag     Order Specific Question:   Antimicrobial Indications     Answer:   Urinary Tract Infection    LORazepam (ATIVAN) injection 0.5 mg    HYDROmorphone (DILAUDID) injection 0.5 mg       CONSULTS:  IP CONSULT TO HOSPITALIST    MEDICAL Jon Mcdonnell / JACOB / Malik End is a 59 y.o. female with past medical history as reported in the HPI presenting with altered mental status and concern for ingestion. On exam, patient is chronically ill-appearing, but is awake, alert, and rolling around in bed. Patient has no focal findings on neurologic exam, although does have some mydriasis that is appreciated, raising concern for ingestion. Due to this, we will plan to obtain a toxicologic work-up for possible ingestion as well as additional labs to assess for metabolic derangements related to altered mental status.   I have low suspicion for sepsis, as patient is currently afebrile, and is bradycardic rather than tachycardic, without any other signs or symptoms of sepsis that would warrant empiric antibiotic administration. We will plan to administer 1 L of IV fluids, as patient does appear to be slightly dehydrated on examination, which would be consistent with a possible anticholinergic toxidrome along with her mydriasis. Patient's EKG shows no conduction abnormalities or stigmata of TCA/arrhythmia genic overdose. Geronimo Ra Her BMP has a number of derangements, but we will repeat this, as it does appear to be hemolyzed. She has no EKG findings that would be concerning for hyperkalemia. Patient's urinalysis is nitrite positive, she has previously grown Klebsiella from urine cultures that was sensitive to ceftriaxone, which we will administer here in the emergency department. I have low suspicion for disseminated infection, although the patient does have some abdominal tenderness, it is primarily in the suprapubic region, and I have low suspicion for SBP that would require diagnostic paracentesis. Patient's repeat BMP reveals a normal potassium (not hemolyzed), but showed persistent hyponatremia and an anion gap acidosis. Given concern for complicated UTI with suspected underlying anticholinergic toxidrome, I do feel that the patient would benefit from admission to the hospital.  Hepatic encephalopathy is also a consideration, although the patient is awake, alert, and able to engage appropriately on examination. An ammonia and INR will be obtained to complete her work-up for this, patient admitted to the hospital.    This patient was also evaluated by the attending physician. All care plans werediscussed and agreed upon. Clinical Impression     1. Altered mental status, unspecified altered mental status type    2. MALENA (acute kidney injury) ([facility] [address]    3. Urinary tract infection without hematuria, site unspecified        Disposition     PATIENT REFERRED TO:  No follow-up provider specified.     DISCHARGE MEDICATIONS:  New Prescriptions    No medications on file       DISPOSITION  Admission to [facility]

## 2022-05-05 NOTE — ED PROVIDER NOTES
ED Attending Attestation Note     Date of evaluation: 5/5/2022    This patient was seen by the resident. I have seen and examined the patient, agree with the workup, evaluation, management and diagnosis. The care plan has been discussed. I have reviewed the ECG and concur with the resident's interpretation. My assessment reveals a patient who is awake and alert, appears encephalopathic. No respiratory depression. Pupils are symmetric and dilated. Mild tachycardia. No evidence of trauma.      Argentina Browne MD  05/05/22 [id number]

## 2022-05-05 NOTE — ED NOTES
Report called to Ev, receiving RN for [id number], will transport when bed is ready     Purnima Boo RN  05/05/22 1958

## 2022-05-05 NOTE — H&P
Hospital Medicine History & Physical      PCP: No primary care provider on file. Date of Admission: 5/5/2022    Date of Service: Pt seen/examined on 05/05/22 and Admitted to Inpatient with expected LOS greater than two midnights due to medical therapy. Chief Complaint:  AMS      History Of Present Illness:      59 y.o. female with a past medical history of alcoholic cirrhosis, COVID-pneumonia complicated by respiratory failure status post tracheostomy (no vent requirement), diabetes, who presents from her living facility with altered mental status and possible ingestion. Patient currently resides in a nursing home [facility]) and was noted to be confused this morning upon awakening[facility] around 3 AM, with concern for administering Tylenol PM.  Patient admits to taking 2 these, but denies any other ingestions today. Patient reporting pain in her back as well as in her abdomen, she has no shortness of breath or chest pain. No fevers that she is reporting. Nursing facility reports she has some visitor that frequently visits her and everytime after the visitor comes she would become very lethargic and drowsy. It was reported that she was visited by the visitors again and was given narcan afterwards. But prior UDS neg for opioids. Past Medical History:          Diagnosis Date    CHF (congestive heart failure) (HCC)     Hyperlipidemia     Obesity     Tracheostomy in place [facility])        Past Surgical History:          Procedure Laterality Date    UPPER GASTROINTESTINAL ENDOSCOPY N/A 3/31/2022    EGD BIOPSY performed by Josephine Stern MD at [facility] ENDOSCOPY       Medications Prior to Admission:      Prior to Admission medications    Medication Sig Start Date End Date Taking? Authorizing Provider   gabapentin (NEURONTIN) 400 MG capsule Take 1 capsule by mouth 3 times daily for 3 doses.  4/4/22 4/5/22  Miriam Seo MD   pantoprazole (PROTONIX) 40 MG tablet Take 1 tablet by mouth in the morning and at bedtime 4/4/22   Joseph Simons MD   acetaminophen (TYLENOL) 325 MG tablet Take 650 mg by mouth every 4 hours as needed for Pain    Historical Provider, MD   albuterol (PROVENTIL) (2.5 MG/3ML) 0.083% nebulizer solution Take 2.5 mg by nebulization 4 times daily    Historical Provider, MD   benzonatate (TESSALON) 100 MG capsule Take 100 mg by mouth 3 times daily as needed for Cough    Historical Provider, MD   cetirizine (ZYRTEC) 10 MG tablet Take 10 mg by mouth daily    Historical Provider, MD   vitamin D (CHOLECALCIFEROL) 125 MCG (5000 UT) CAPS capsule Take 5,000 Units by mouth daily    Historical Provider, MD   fluticasone (FLONASE) 50 MCG/ACT nasal spray 2 sprays by Each Nostril route daily    Historical Provider, MD   glipiZIDE (GLUCOTROL) 5 MG tablet Take 5 mg by mouth every morning (before breakfast)    Historical Provider, MD   hydrOXYzine (ATARAX) 25 MG tablet Take 25 mg by mouth every 8 hours as needed for Itching or Anxiety    Historical Provider, MD   ipratropium-albuterol (DUONEB) 0.5-2.5 (3) MG/3ML SOLN nebulizer solution Inhale 1 vial into the lungs every 4 hours as needed for Shortness of Breath    Historical Provider, MD   lidocaine (LIDODERM) 5 % Place 1 patch onto the skin daily 12 hours on, 12 hours off. Historical Provider, MD   melatonin 3 MG TABS tablet Take 3 mg by mouth at bedtime    Historical Provider, MD   metFORMIN (GLUCOPHAGE-XR) 500 MG extended release tablet Take 1,000 mg by mouth in the morning and at bedtime    Historical Provider, MD   metoprolol succinate (TOPROL XL) 25 MG extended release tablet Take 25 mg by mouth daily    Historical Provider, MD   miconazole (MICOTIN) 2 % powder Apply topically 2 times daily Apply topically 2 times daily.     Historical Provider, MD   Multiple Vitamins-Minerals (CENTRUM/CERTA-MARJAN WITH MINERALS ORAL) solution Take 15 mLs by mouth every other day    Historical Provider, MD naloxone 4 MG/0.1ML LIQD nasal spray 1 spray by Nasal route as needed for Opioid Reversal    Historical Provider, MD   QUEtiapine (SEROQUEL) 300 MG tablet Take 300 mg by mouth at bedtime    Historical Provider, MD   spironolactone (ALDACTONE) 50 MG tablet Take 50 mg by mouth daily    Historical Provider, MD   torsemide (DEMADEX) 20 MG tablet Take 20 mg by mouth daily    Historical Provider, MD       Allergies:  Patient has no known allergies. Social History:      The patient currently lives at care facility    TOBACCO:   reports that she has quit smoking. She has never used smokeless tobacco.  ETOH:   reports previous alcohol use. E-Cigarettes/Vaping Use     Questions Responses    E-Cigarette/Vaping Use     Start Date     Passive Exposure     Quit Date     Counseling Given     Comments             Family History:       Reviewed in detail and negative for DM, CAD, Cancer, CVA. Positive as follows:    History reviewed. No pertinent family history. REVIEW OF SYSTEMS COMPLETED:   Pertinent positives as noted in the HPI. All other systems reviewed and negative. PHYSICAL EXAM PERFORMED:    BP (!) 109/55   Pulse 60   Temp 98.4 °F (36.9 °C) (Oral)   Resp 20   Ht 5' 6\" (1.676 m)   Wt 201 lb 4.8 oz (91.3 kg)   LMP 03/31/1982   SpO2 99%   BMI 32.49 kg/m²     General appearance:  Chronically ill appearing, confused  HEENT:  Normal cephalic, atraumatic without obvious deformity. Pupils equal, round, and reactive to light. Extra ocular muscles intact. Conjunctivae/corneas clear. Neck: Supple, with full range of motion. No jugular venous distention. Trachea midline. Respiratory:  Normal respiratory effort. Clear to auscultation, bilaterally without Rales/Wheezes/Rhonchi. Cardiovascular:  Regular rate and rhythm with normal S1/S2 without murmurs, rubs or gallops. Abdomen: Soft, non-tender, non-distended with normal bowel sounds. Musculoskeletal:  No clubbing, cyanosis or edema bilaterally.   Low back pain  Skin: Skin color, texture, turgor normal.  No rashes or lesions. Neurologic:  Neurovascularly intact without any focal sensory/motor deficits. Cranial nerves: II-XII intact, grossly non-focal.  Psychiatric:  Alert and oriented, thought content appropriate, normal insight  Capillary Refill: Brisk,3 seconds, normal  Peripheral Pulses: +2 palpable, equal bilaterally       Labs:     Recent Labs     05/05/22  1143   WBC 11.0   HGB 13.2   HCT 39.8        Recent Labs     05/05/22  1143 05/05/22  1227   * 129*   K 9.0* 4.4   CL 89* 91*   CO2 16* 18*   BUN 61* 61*   CREATININE 2.1* 2.1*   CALCIUM 9.5 9.8     Recent Labs     05/05/22  1143   AST 70*   ALT 12   BILIDIR 0.3   BILITOT 1.5*   ALKPHOS 157*     Recent Labs     05/05/22  1354   INR 2.01*     No results for input(s): Colby Breeze in the last 72 hours. Urinalysis:      Lab Results   Component Value Date    NITRU POSITIVE 05/05/2022    WBCUA 3-5 05/05/2022    BACTERIA 4+ 05/05/2022    RBCUA None seen 05/05/2022    BLOODU Negative 05/05/2022    SPECGRAV 1.010 05/05/2022    GLUCOSEU Negative 05/05/2022       Radiology:     CXR: I have reviewed the CXR with the following interpretation: neg  EKG:  I have reviewed the EKG with the following interpretation: NSR    XR CHEST PORTABLE   Final Result      No acute cardiopulmonary disease. ASSESSMENT:    Active Hospital Problems    Diagnosis Date Noted    Acute metabolic encephalopathy [V68.99] 05/05/2022     Priority: Medium     Acute metabolic encephalopathy likely 2/2 hepatic encephalopathy  Drug induced encephalopathy is another possibility  Alcoholic cirrhosis  - nursing home report there is a visitor that frequently sees her and every time that visitor left she becomes more confused and lethargic however UDS neg for opioids.  But she was given narcan prior to transfer to hospital  - HCV abx negative (1/13/22), HBV negative (not vaccinated)  - ammonia is elevated to 93  - IVF  - lactulose and rifaximin  - consulted neurology  - consulted GI    MALENA  Hyponatremia  - likely pre-renal  - holding spironolactone and torsemide  - IVF  - consulted nephrology  - decrease gabapentin to 100 TID and seroquel to 100 bedtime      PUD  - March 2022 EGD showed 3 clean based antral ulcers and duodenal ulcers  - IV PPI    UTI  - was treated for UTI during last admission, previous urine culture showed klebsialla and proteus both sensitive to rocephin  - will start IV rocephin for now  - UA positive again, culture pending    DMII with neuropathy  - sliding scale  - gabapentin reduced dose    Chronic hypoxic resp 2/2 COVID PNA s/p trach  Chronic COPD not in exacerbation  - Hx of Staph aureus, Serratia, Streno per bronchs in 11/2021 - 12/2021  - has chronic trach  - COPD not in exacerbation  - duoneb PRN    Chronic Diastolic CHF  - hold diuretics  - TTE 1/2022 showed normal EF    Obesity  - recommend weight loss    Bipolar  - seroquel reduced dose        DVT Prophylaxis: heparin  Diet: regular  Code Status: Full    PT/OT Eval Status: pending    Dispo - wards       Brandie Tucker DO    Thank you No primary care provider on file. for the opportunity to be involved in this patient's care. If you have any questions or concerns please feel free to contact me at [phone].

## 2022-05-05 NOTE — CONSULTS
NEUROLOGY / NEUROCRITICAL CARE CONSULT NOTE       Terra Lewis DO is requesting this consult. Reason for Consult: AMS  Admission Chief Complaint: Altered Mental Status and Abdominal Pain        History of Present Illness     NITA Etienne is a 59 y.o. y/o female with CHF, HLD, Obesity, alcoholic liver disease, s/p tracheostomy after COVID-19 pneumonia (November 2021), DM2 who presents with AMS from nursing facility. Patient provides no history though will wake to answer some orientation questions and follow simple commands. Per ER notes there is some concern a visitor at her nursing facility may be providing patient with narcotics. She had a visitor this morning and afterward with very difficult to arouse, was given narcan and became restless and agitated. UDS was negative (Serum drug panel added on to ED labs - UDS will be negative for some of the synthetic opioids). Since arrive in ER patient has been restless c/o stabbing pain in lower back, given intermittent doses of dilaudid for pain control. Of note patient's sodium level is 124, ammonia is 93. BUN 61 w/ creatinine of 2.1 (new MALENA). Remains somnolent during my exam but able to answer some questions. REVIEW OF SYSTEMS:   JIA d/t patient participation w/ exam    Past Medical, Surgical, Family, and Social History   PAST MEDICAL HISTORY:  Past Medical History:   Diagnosis Date    CHF [facility]congestive heart failure) ([facility] [address]     Hyperlipidemia     Obesity     Tracheostomy in place [facility])      SURGICAL HISTORY:  Past Surgical History:   Procedure Laterality Date    UPPER GASTROINTESTINAL ENDOSCOPY N/A 3/31/2022    EGD BIOPSY performed by Grey Kelly MD at [facility]:  Family history non-contributory  History reviewed. No pertinent family history.   Social History     Tobacco Use    Smoking status: Former Smoker    Smokeless tobacco: Never Used   Substance Use Topics    Alcohol use: Not Currently    Drug use: Never          Allergies & Outpatient Medications   ALLERGIES:  No Known Allergies  HOME MEDICATIONS:  Patient's Medications   New Prescriptions    No medications on file   Previous Medications    ACETAMINOPHEN (TYLENOL) 325 MG TABLET    Take 650 mg by mouth every 4 hours as needed for Pain    ALBUTEROL (PROVENTIL) (2.5 MG/3ML) 0.083% NEBULIZER SOLUTION    Take 2.5 mg by nebulization 4 times daily    BENZONATATE (TESSALON) 100 MG CAPSULE    Take 100 mg by mouth 3 times daily as needed for Cough    CETIRIZINE (ZYRTEC) 10 MG TABLET    Take 10 mg by mouth daily    FLUTICASONE (FLONASE) 50 MCG/ACT NASAL SPRAY    2 sprays by Each Nostril route daily    GABAPENTIN (NEURONTIN) 400 MG CAPSULE    Take 1 capsule by mouth 3 times daily for 3 doses. GLIPIZIDE (GLUCOTROL) 5 MG TABLET    Take 5 mg by mouth every morning (before breakfast)    HYDROXYZINE (ATARAX) 25 MG TABLET    Take 25 mg by mouth every 8 hours as needed for Itching or Anxiety    IPRATROPIUM-ALBUTEROL (DUONEB) 0.5-2.5 (3) MG/3ML SOLN NEBULIZER SOLUTION    Inhale 1 vial into the lungs every 4 hours as needed for Shortness of Breath    LIDOCAINE (LIDODERM) 5 %    Place 1 patch onto the skin daily 12 hours on, 12 hours off. MELATONIN 3 MG TABS TABLET    Take 3 mg by mouth at bedtime    METFORMIN (GLUCOPHAGE-XR) 500 MG EXTENDED RELEASE TABLET    Take 1,000 mg by mouth in the morning and at bedtime    METOPROLOL SUCCINATE (TOPROL XL) 25 MG EXTENDED RELEASE TABLET    Take 25 mg by mouth daily    MICONAZOLE (MICOTIN) 2 % POWDER    Apply topically 2 times daily Apply topically 2 times daily.     MULTIPLE VITAMINS-MINERALS (CENTRUM/CERTA-MARJAN WITH MINERALS ORAL) SOLUTION    Take 15 mLs by mouth every other day    NALOXONE 4 MG/0.1ML LIQD NASAL SPRAY    1 spray by Nasal route as needed for Opioid Reversal    PANTOPRAZOLE (PROTONIX) 40 MG TABLET    Take 1 tablet by mouth in the morning and at bedtime    QUETIAPINE (SEROQUEL) 300 MG TABLET    Take 300 mg by mouth at bedtime    SPIRONOLACTONE (ALDACTONE) 50 MG TABLET    Take 50 mg by mouth daily    TORSEMIDE (DEMADEX) 20 MG TABLET    Take 20 mg by mouth daily    VITAMIN D (CHOLECALCIFEROL) 125 MCG (5000 UT) CAPS CAPSULE    Take 5,000 Units by mouth daily   Modified Medications    No medications on file   Discontinued Medications    No medications on file         Physical Exam   PHYSICAL EXAM:  BP (!) 109/55   Pulse 60   Temp 98.4 °F (36.9 °C) (Oral)   Resp 20   Ht 5' 6\" (1.676 m)   Wt 201 lb 4.8 oz (91.3 kg)   LMP 03/31/1982   SpO2 99%   BMI 32.49 kg/m²     General Somnolent, wakes to light touch / voice, will open eyes and regard, appears chronically ill     Neurologic  Mental status:   Orientation to person, place, year, does not recall situation  Attention limited d/t somnolence    Speech clear, answers questions appropriately when patient is awake  Comprehension intact; follows simple commands    Cranial nerves:   Limited participation with exam  Pupils 5mm - briskly reactive bilaterally   Gaze conjugate  Face symmetric      Motor Exam:  Moves all extremities. No focal weakness noted. Patient able to lift all extremities from bed, but participation with exam is limited     Sensory: light touch intact and symmetric in all 4 extremities. Tone: normal in all 4 extremities    Cardiovascular: Warm, appears well perfused   Respiratory: Easy, non-labored respiratory pattern   Abdominal: Abdomen is without distention   Extremities: Upper and lower extremities are atraumatic in appearance without deformity. Diagnostic Results   IMAGES:  No neuro imaging     LABS:  All results below personally reviewed. Pertinent positives & negatives are addressed in Impression & Recommendations below.      LABS   Metabolic Panel Recent Labs     05/05/22  1143 05/05/22  1227 05/05/22  1354   * 129*  --    K 9.0* 4.4  --    CL 89* 91*  --    CO2 16* 18*  --    BUN 61* 61*  --    CREATININE 2.1* 2.1*  --    GLUCOSE 129* 139*  -- CALCIUM 9.5 9.8  --    LABALBU 3.3*  --   --    ALKPHOS 157*  --   --    ALT 12  --   --    AST 70*  --   --    AMMONIA  --   --  93*      CBC / Coags Recent Labs     05/05/22  1143 05/05/22  1354   WBC 11.0  --    RBC 4.59  --    HGB 13.2  --    HCT 39.8  --      --    INR  --  2.01*      Other Lab Results   Component Value Date    LABSALI <0.3 05/05/2022    COVID19 Not Detected 03/29/2022     Lab Results   Component Value Date    LACTA 3.7 05/05/2022          CURRENT SCHEDULED MEDICATIONS   Inpatient Medications     lidocaine, 1 patch, TransDERmal, Daily    [START ON 5/6/2022] cefTRIAXone (ROCEPHIN) IV, 1,000 mg, IntraVENous, Q24H    lactulose, 20 g, Oral, TID    rifAXIMin, 550 mg, Oral, TID   Infusions      Antibiotics   Recent Abx Admin                   cefTRIAXone (ROCEPHIN) 1000 mg IVPB in 50 mL D5W minibag (mg) 1,000 mg New Bag 05/05/22 1356                 IMPRESSION & RECOMMENDATIONS     IMPRESSION:  Patient is a 60 y/o F w/ multiple medical co-morbidities and multiple medical derangements on lab work. Suspect AMS is mostly related to new hyponatremia, elevated BUN/Crt and elevated ammonia. RECOMMENDATIONS:  Please obtain head CT w/o contrast (ordered)  Serum drug screen added on to lab work from ED. We will reassess exam tomorrow to assess need for further imaging / neuro workup. We will follow. Thanks for consult. Please call with questions.      ANNIA Escamilla - CNP   Neurology & Neurocritical Care   Neurology Line: [phone]  [employer]: [facility] NPs  5/5/2022 4:22 PM

## 2022-05-05 NOTE — ED NOTES
Patient remains restless and complaining of pain as before with no s/s of relief noted after ativan. Dr frankenfeld notified of patient's continued , persistent c/o back pain.   Medicated with dilaudid 0.5mg IVP as ordered     Angelo Cotton RN  05/05/22 0412

## 2022-05-05 NOTE — CONSULTS
Nephrology Consult Note                                                                                                                                                                                                                                                                                                                                                               Office : [phone]     Fax :[phone]              Patient's Name: Arie Sanchez  10:16 PM  5/5/2022    Reason for Consult: MALENA   Requesting Physician:  No primary care provider on file. Chief Complaint:  AMS     History of Present Ilness:    59 y.o. female with a past medical history of alcoholic cirrhosis, COVID-pneumonia complicated by respiratory failure status post tracheostomy (no vent requirement), diabetes, who presents from her living facility with altered mental status and possible ingestion.  Patient currently resides in a nursing home [facility]) and was noted to be confused this morning upon awakening. Past Medical History:   Diagnosis Date    CHF (congestive heart failure) (HCC)     Hyperlipidemia     Obesity     Tracheostomy in place [facility])        Past Surgical History:   Procedure Laterality Date    UPPER GASTROINTESTINAL ENDOSCOPY N/A 3/31/2022    EGD BIOPSY performed by Sindy Espinoza MD at [facility] reviewed. No pertinent family history. reports that she has quit smoking. She has never used smokeless tobacco. She reports previous alcohol use. She reports that she does not use drugs. Allergies:  Patient has no known allergies.     Current Medications:    lidocaine 4 % external patch 1 patch, Daily  [START ON 5/6/2022] cetirizine (ZYRTEC) tablet 10 mg, Daily  gabapentin (NEURONTIN) capsule 200 mg, TID  ipratropium-albuterol (DUONEB) nebulizer solution 3 mL, Q4H PRN  [START ON 5/6/2022] lidocaine 4 % external patch 1 patch, Daily  melatonin tablet 3 mg, Nightly  [START ON 5/6/2022] metoprolol succinate (TOPROL XL) extended release tablet 25 mg, Daily  [START ON 0/4/0786] CENTRUM/CERTA-MARJAN with minerals oral solution 15 mL, Every Other Day  QUEtiapine (SEROQUEL) tablet 100 mg, Nightly  [START ON 5/6/2022] vitamin D (CHOLECALCIFEROL) capsule 5,000 Units, Daily  sodium chloride flush 0.9 % injection 5-40 mL, 2 times per day  sodium chloride flush 0.9 % injection 5-40 mL, PRN  0.9 % sodium chloride infusion, PRN  [START ON 5/6/2022] enoxaparin (LOVENOX) injection 40 mg, Daily  ondansetron (ZOFRAN-ODT) disintegrating tablet 4 mg, Q8H PRN   Or  ondansetron (ZOFRAN) injection 4 mg, Q6H PRN  polyethylene glycol (GLYCOLAX) packet 17 g, Daily PRN  acetaminophen (TYLENOL) tablet 650 mg, Q6H PRN   Or  acetaminophen (TYLENOL) suppository 650 mg, Q6H PRN  [START ON 5/6/2022] cefTRIAXone (ROCEPHIN) 1000 mg IVPB in 50 mL D5W minibag, Q24H  lactulose (CHRONULAC) 10 GM/15ML solution 20 g, TID  rifAXIMin (XIFAXAN) tablet 550 mg, TID  HYDROmorphone (DILAUDID) injection 0.25 mg, Q4H PRN  pantoprazole (PROTONIX) injection 40 mg, BID  sucralfate (CARAFATE) tablet 1 g, 4 times per day  insulin lispro (1 Unit Dial) 0-12 Units, TID WC  insulin lispro (1 Unit Dial) 0-6 Units, Nightly  glucagon (rDNA) injection 1 mg, PRN  dextrose 5 % solution, PRN  dextrose bolus 10% 250 mL, PRN  glucose chewable tablet 16 g, PRN        Review of Systems:   14 point ROS obtained but were negative except mentioned in HPI      Physical exam:     Vitals:  BP (!) 95/59   Pulse 56   Temp 97.2 °F (36.2 °C) (Oral)   Resp 18   Ht 5' 6\" (1.676 m)   Wt 199 lb 8 oz (90.5 kg)   LMP 03/31/1982   SpO2 97%   BMI 32.20 kg/m²   Constitutional:  OAA X3 NAD  Skin: no rash, turgor wnl  Heent:  eomi, mmm  Neck: no bruits or jvd noted  Cardiovascular:  S1, S2 without m/r/g  Respiratory: dec BS   Abdomen:  +bs, soft, nt, nd  Ext: ++ lower extremity edema  Psychiatric: mood and affect flat   Musculoskeletal:  Rom, muscular strength intact    Data: Labs:  CBC:   Recent Labs     05/05/22  1143   WBC 11.0   HGB 13.2        BMP:    Recent Labs     05/05/22  1143 05/05/22  1227   * 129*   K 9.0* 4.4   CL 89* 91*   CO2 16* 18*   BUN 61* 61*   CREATININE 2.1* 2.1*   GLUCOSE 129* 139*     Ca/Mg/Phos:   Recent Labs     05/05/22  1143 05/05/22  1227   CALCIUM 9.5 9.8     Hepatic:   Recent Labs     05/05/22  1143   AST 70*   ALT 12   BILITOT 1.5*   ALKPHOS 157*     Troponin: No results for input(s): TROPONINI in the last 72 hours. BNP: No results for input(s): BNP in the last 72 hours. Lipids: No results for input(s): CHOL, TRIG, HDL, LDLCALC, LABVLDL in the last 72 hours. ABGs: No results for input(s): PHART, PO2ART, XAM7CME in the last 72 hours. INR:   Recent Labs     05/05/22  1354   INR 2.01*     UA:  Recent Labs     05/05/22  1207 05/05/22  1207 05/05/22  1331   COLORU Yellow  --   --    CLARITYU Clear  --   --    [place] [address] Negative  --   --    BILIRUBINUR Negative  --   --    KETUA Negative  --   --    SPECGRAV 1.010  --   --    BLOODU Negative  --   --    PHUR 6.0   < > 5.0   PROTEINU Negative  --   --    UROBILINOGEN 0.2  --   --    NITRU POSITIVE*  --   --    LEUKOCYTESUR SMALL*  --   --    LABMICR YES  --   --    URINETYPE NotGiven  --   --     < > = values in this interval not displayed. Urine Microscopic:   Recent Labs     05/05/22  1207   BACTERIA 4+*   HYALCAST 0-2   WBCUA 3-5   RBCUA None seen   EPIU 6-10*     Urine Culture: No results for input(s): LABURIN in the last 72 hours. Urine Chemistry: No results for input(s): Douglas Hashimoto, PROTEINUR, NAUR in the last 72 hours. IMAGING:  CT HEAD WO CONTRAST   Final Result      No acute intracranial pathology                 XR CHEST PORTABLE   Final Result      No acute cardiopulmonary disease. Assessment/Plan   1. AMS     2. Alc cirrhosis     3. Anemia    4. Acid- base/ Electrolyte imbalance     5.  MALENA     Plan   - octreotide + Midodrine + albumin  - Hold diuretics - Ur studies   - Monitor UO and renal function   - labs in am   - Chronic hypoxic resp 2/2 COVID PNA s/p trach    Recommend to dose adjust all medications  based on renal functions  Maintain SBP> 90 mmHg   Daily weights   AVOID NSAIDs  Avoid Nephrotoxins  Monitor Intake/Output  Call if significant decrease in urine output                 Thank you for allowing us to participate in care of Rosy Vegas MD  Feel free to contact me   [facility] [address]  Office : [phone]  Fax :[phone]

## 2022-05-06 ENCOUNTER — APPOINTMENT (OUTPATIENT)
Dept: ULTRASOUND IMAGING | Age: 57
DRG: 441 | End: 2022-05-06
Payer: MEDICARE

## 2022-05-06 LAB
A/G RATIO: 0.7 (ref 1.1–2.2)
ALBUMIN SERPL-MCNC: 3.6 G/DL (ref 3.4–5)
ALP BLD-CCNC: 139 U/L (ref 40–129)
ALT SERPL-CCNC: 10 U/L (ref 10–40)
ANION GAP SERPL CALCULATED.3IONS-SCNC: 14 MMOL/L (ref 3–16)
AST SERPL-CCNC: 21 U/L (ref 15–37)
BASOPHILS ABSOLUTE: 0 K/UL (ref 0–0.2)
BASOPHILS RELATIVE PERCENT: 0.7 %
BILIRUB SERPL-MCNC: 1.6 MG/DL (ref 0–1)
BUN BLDV-MCNC: 54 MG/DL (ref 7–20)
CALCIUM SERPL-MCNC: 9.6 MG/DL (ref 8.3–10.6)
CHLORIDE BLD-SCNC: 98 MMOL/L (ref 99–110)
CO2: 25 MMOL/L (ref 21–32)
CREAT SERPL-MCNC: 1.5 MG/DL (ref 0.6–1.1)
EOSINOPHILS ABSOLUTE: 0.1 K/UL (ref 0–0.6)
EOSINOPHILS RELATIVE PERCENT: 1.5 %
GFR AFRICAN AMERICAN: 43
GFR NON-AFRICAN AMERICAN: 36
GLUCOSE BLD-MCNC: 103 MG/DL (ref 70–99)
GLUCOSE BLD-MCNC: 150 MG/DL (ref 70–99)
GLUCOSE BLD-MCNC: 153 MG/DL (ref 70–99)
GLUCOSE BLD-MCNC: 163 MG/DL (ref 70–99)
GLUCOSE BLD-MCNC: 95 MG/DL (ref 70–99)
HAV IGM SER IA-ACNC: NORMAL
HBV SURFACE AB TITR SER: <3.5 MIU/ML
HCT VFR BLD CALC: 34 % (ref 36–48)
HEMOGLOBIN: 11.3 G/DL (ref 12–16)
HEPATITIS B CORE IGM ANTIBODY: NORMAL
HEPATITIS B SURFACE ANTIGEN INTERPRETATION: NORMAL
HEPATITIS C ANTIBODY INTERPRETATION: NORMAL
LV EF: 63 %
LVEF MODALITY: NORMAL
LYMPHOCYTES ABSOLUTE: 1.1 K/UL (ref 1–5.1)
LYMPHOCYTES RELATIVE PERCENT: 30.5 %
MCH RBC QN AUTO: 28.6 PG (ref 26–34)
MCHC RBC AUTO-ENTMCNC: 33.3 G/DL (ref 31–36)
MCV RBC AUTO: 85.8 FL (ref 80–100)
MONOCYTES ABSOLUTE: 0.3 K/UL (ref 0–1.3)
MONOCYTES RELATIVE PERCENT: 9.5 %
NEUTROPHILS ABSOLUTE: 2.1 K/UL (ref 1.7–7.7)
NEUTROPHILS RELATIVE PERCENT: 57.8 %
PDW BLD-RTO: 14.7 % (ref 12.4–15.4)
PERFORMED ON: ABNORMAL
PERFORMED ON: NORMAL
PLATELET # BLD: 139 K/UL (ref 135–450)
PMV BLD AUTO: 7.7 FL (ref 5–10.5)
POTASSIUM REFLEX MAGNESIUM: 4 MMOL/L (ref 3.5–5.1)
RBC # BLD: 3.96 M/UL (ref 4–5.2)
REPORT: NORMAL
SODIUM BLD-SCNC: 137 MMOL/L (ref 136–145)
TOTAL PROTEIN: 8.5 G/DL (ref 6.4–8.2)
WBC # BLD: 3.6 K/UL (ref 4–11)

## 2022-05-06 PROCEDURE — 6370000000 HC RX 637 (ALT 250 FOR IP): Performed by: INTERNAL MEDICINE

## 2022-05-06 PROCEDURE — C8929 TTE W OR WO FOL WCON,DOPPLER: HCPCS

## 2022-05-06 PROCEDURE — 2580000003 HC RX 258: Performed by: INTERNAL MEDICINE

## 2022-05-06 PROCEDURE — 99231 SBSQ HOSP IP/OBS SF/LOW 25: CPT | Performed by: NURSE PRACTITIONER

## 2022-05-06 PROCEDURE — 6360000002 HC RX W HCPCS: Performed by: INTERNAL MEDICINE

## 2022-05-06 PROCEDURE — 86706 HEP B SURFACE ANTIBODY: CPT

## 2022-05-06 PROCEDURE — 82105 ALPHA-FETOPROTEIN SERUM: CPT

## 2022-05-06 PROCEDURE — 99233 SBSQ HOSP IP/OBS HIGH 50: CPT | Performed by: INTERNAL MEDICINE

## 2022-05-06 PROCEDURE — 86708 HEPATITIS A ANTIBODY: CPT

## 2022-05-06 PROCEDURE — 2700000000 HC OXYGEN THERAPY PER DAY

## 2022-05-06 PROCEDURE — 94761 N-INVAS EAR/PLS OXIMETRY MLT: CPT

## 2022-05-06 PROCEDURE — C9113 INJ PANTOPRAZOLE SODIUM, VIA: HCPCS | Performed by: INTERNAL MEDICINE

## 2022-05-06 PROCEDURE — 80053 COMPREHEN METABOLIC PANEL: CPT

## 2022-05-06 PROCEDURE — 99255 IP/OBS CONSLTJ NEW/EST HI 80: CPT | Performed by: INTERNAL MEDICINE

## 2022-05-06 PROCEDURE — P9047 ALBUMIN (HUMAN), 25%, 50ML: HCPCS | Performed by: INTERNAL MEDICINE

## 2022-05-06 PROCEDURE — 80074 ACUTE HEPATITIS PANEL: CPT

## 2022-05-06 PROCEDURE — 76705 ECHO EXAM OF ABDOMEN: CPT

## 2022-05-06 PROCEDURE — 1200000000 HC SEMI PRIVATE

## 2022-05-06 PROCEDURE — 85025 COMPLETE CBC W/AUTO DIFF WBC: CPT

## 2022-05-06 PROCEDURE — 36415 COLL VENOUS BLD VENIPUNCTURE: CPT

## 2022-05-06 RX ORDER — HYDROCODONE BITARTRATE AND ACETAMINOPHEN 5; 325 MG/1; MG/1
1 TABLET ORAL EVERY 6 HOURS PRN
Status: DISCONTINUED | OUTPATIENT
Start: 2022-05-06 | End: 2022-05-07

## 2022-05-06 RX ORDER — TIZANIDINE 4 MG/1
4 TABLET ORAL NIGHTLY
Status: ON HOLD | COMMUNITY
End: 2022-05-10 | Stop reason: HOSPADM

## 2022-05-06 RX ORDER — LACTULOSE 10 G/15ML
10 SOLUTION ORAL 2 TIMES DAILY
Status: DISCONTINUED | OUTPATIENT
Start: 2022-05-06 | End: 2022-05-07

## 2022-05-06 RX ORDER — TRAZODONE HYDROCHLORIDE 100 MG/1
100 TABLET ORAL NIGHTLY
COMMUNITY

## 2022-05-06 RX ORDER — HYDROCODONE BITARTRATE AND ACETAMINOPHEN 10; 325 MG/1; MG/1
1 TABLET ORAL EVERY 6 HOURS PRN
Status: DISCONTINUED | OUTPATIENT
Start: 2022-05-06 | End: 2022-05-06

## 2022-05-06 RX ORDER — TIZANIDINE 4 MG/1
2 TABLET ORAL DAILY
Status: ON HOLD | COMMUNITY
End: 2022-05-10 | Stop reason: HOSPADM

## 2022-05-06 RX ADMIN — SUCRALFATE 1 G: 1 TABLET ORAL at 05:03

## 2022-05-06 RX ADMIN — HYDROCODONE BITARTRATE AND ACETAMINOPHEN 1 TABLET: 10; 325 TABLET ORAL at 10:33

## 2022-05-06 RX ADMIN — ONDANSETRON 4 MG: 2 INJECTION INTRAMUSCULAR; INTRAVENOUS at 14:34

## 2022-05-06 RX ADMIN — ALBUMIN (HUMAN) 25 G: 0.25 INJECTION, SOLUTION INTRAVENOUS at 05:10

## 2022-05-06 RX ADMIN — DEXTROSE MONOHYDRATE 1000 MG: 5 INJECTION INTRAVENOUS at 09:41

## 2022-05-06 RX ADMIN — PANTOPRAZOLE SODIUM 40 MG: 40 INJECTION, POWDER, FOR SOLUTION INTRAVENOUS at 21:11

## 2022-05-06 RX ADMIN — OCTREOTIDE ACETATE 150 MCG: 100 INJECTION, SOLUTION INTRAVENOUS; SUBCUTANEOUS at 14:34

## 2022-05-06 RX ADMIN — SUCRALFATE 1 G: 1 TABLET ORAL at 00:34

## 2022-05-06 RX ADMIN — CEFTRIAXONE 1000 MG: 1 INJECTION, POWDER, FOR SOLUTION INTRAMUSCULAR; INTRAVENOUS at 14:33

## 2022-05-06 RX ADMIN — MICONAZOLE NITRATE: 20 CREAM TOPICAL at 21:32

## 2022-05-06 RX ADMIN — SODIUM CHLORIDE, PRESERVATIVE FREE 10 ML: 5 INJECTION INTRAVENOUS at 21:33

## 2022-05-06 RX ADMIN — RIFAXIMIN 550 MG: 550 TABLET ORAL at 21:12

## 2022-05-06 RX ADMIN — METOPROLOL SUCCINATE 25 MG: 25 TABLET, FILM COATED, EXTENDED RELEASE ORAL at 08:29

## 2022-05-06 RX ADMIN — LACTULOSE 20 G: 20 SOLUTION ORAL at 08:28

## 2022-05-06 RX ADMIN — Medication 15 ML: at 11:33

## 2022-05-06 RX ADMIN — PIPERACILLIN AND TAZOBACTAM 4500 MG: 4; .5 INJECTION, POWDER, FOR SOLUTION INTRAVENOUS at 16:53

## 2022-05-06 RX ADMIN — PANTOPRAZOLE SODIUM 40 MG: 40 INJECTION, POWDER, FOR SOLUTION INTRAVENOUS at 08:29

## 2022-05-06 RX ADMIN — Medication 5000 UNITS: at 08:29

## 2022-05-06 RX ADMIN — ALBUMIN (HUMAN) 25 G: 0.25 INJECTION, SOLUTION INTRAVENOUS at 11:33

## 2022-05-06 RX ADMIN — MIDODRINE HYDROCHLORIDE 5 MG: 5 TABLET ORAL at 08:29

## 2022-05-06 RX ADMIN — ENOXAPARIN SODIUM 40 MG: 100 INJECTION SUBCUTANEOUS at 08:29

## 2022-05-06 RX ADMIN — ALBUMIN (HUMAN) 25 G: 0.25 INJECTION, SOLUTION INTRAVENOUS at 17:45

## 2022-05-06 RX ADMIN — OCTREOTIDE ACETATE 150 MCG: 100 INJECTION, SOLUTION INTRAVENOUS; SUBCUTANEOUS at 00:35

## 2022-05-06 RX ADMIN — INSULIN LISPRO 1 UNITS: 100 INJECTION, SOLUTION INTRAVENOUS; SUBCUTANEOUS at 21:34

## 2022-05-06 RX ADMIN — MIDODRINE HYDROCHLORIDE 5 MG: 5 TABLET ORAL at 14:34

## 2022-05-06 RX ADMIN — HYDROCODONE BITARTRATE AND ACETAMINOPHEN 1 TABLET: 5; 325 TABLET ORAL at 21:12

## 2022-05-06 RX ADMIN — LACTULOSE 10 G: 20 SOLUTION ORAL at 21:11

## 2022-05-06 RX ADMIN — ALBUMIN (HUMAN) 25 G: 0.25 INJECTION, SOLUTION INTRAVENOUS at 00:05

## 2022-05-06 RX ADMIN — PIPERACILLIN AND TAZOBACTAM 3375 MG: 3; .375 INJECTION, POWDER, LYOPHILIZED, FOR SOLUTION INTRAVENOUS at 21:41

## 2022-05-06 RX ADMIN — RIFAXIMIN 550 MG: 550 TABLET ORAL at 08:29

## 2022-05-06 RX ADMIN — SODIUM CHLORIDE, PRESERVATIVE FREE 10 ML: 5 INJECTION INTRAVENOUS at 09:42

## 2022-05-06 ASSESSMENT — PAIN SCALES - GENERAL
PAINLEVEL_OUTOF10: 3
PAINLEVEL_OUTOF10: 9

## 2022-05-06 ASSESSMENT — PAIN DESCRIPTION - LOCATION: LOCATION: BACK

## 2022-05-06 NOTE — CONSULTS
Consult Note      NITA ALLEN Magdalenofuabbi  1965    Consultant: Flavio  Reason for Consult:  \"Cirrhosis\"  Requesting Physician:  Bong Mayo COMPLAINT:  confusion    History Obtained From:  patient, electronic medical record    HISTORY OF PRESENT ILLNESS:                The patient is a 59 y.o. female with significant past medical history of cirrhosis who presents with confusion. Took 2 tylenol PM last night and feeling confused. Denies other meds. Some abdominal pain as well. No fever. No melena. No hematochezia. Now back to her baseline. Only complaint is too much diarrhea with the lactulose and chronic back pain. Past Medical History:        Diagnosis Date    CHF (congestive heart failure) (HCC)     Hyperlipidemia     Obesity     Tr[facility] in [facility])      Past Surgical History:        Procedure Laterality Date    UPPER GASTROINTESTINAL ENDOSCOPY N/A 3/31/2022    EGD BIOPSY performed by Leroy Gonzalez MD at [address] ENDOSCOPY     Medications at Home:  Medications Prior to Admission: metFORMIN (GLUCOPHAGE) 500 MG tablet, Take 1,000 mg by mouth 2 times daily (with meals)  metoprolol tartrate (LOPRESSOR) 25 MG tablet, Take 25 mg by mouth 2 times daily  traZODone (DESYREL) 100 MG tablet, Take 100 mg by mouth nightly  tiZANidine (ZANAFLEX) 4 MG tablet, Take 2 mg by mouth daily  tiZANidine (ZANAFLEX) 4 MG tablet, Take 4 mg by mouth at bedtime  pantoprazole (PROTONIX) 40 MG tablet, Take 1 tablet by mouth in the morning and at bedtime  [DISCONTINUED] gabapentin (NEURONTIN) 400 MG capsule, Take 1 capsule by mouth 3 times daily for 3 doses.   acetaminophen (TYLENOL) 325 MG tablet, Take 650 mg by mouth every 4 hours as needed for Pain  albuterol (PROVENTIL) (2.5 MG/3ML) 0.083% nebulizer solution, Take 2.5 mg by nebulization 4 times daily  benzonatate (TESSALON) 100 MG capsule, Take 100 mg by mouth 3 times daily as needed for Cough  vitamin D (CHOLECALCIFEROL) 125 MCG (5000 UT) CAPS capsule, Take 5,000 Units by mouth daily  fluticasone (FLONASE) 50 MCG/ACT nasal spray, 2 sprays by Each Nostril route daily  glipiZIDE (GLUCOTROL) 5 MG tablet, Take 5 mg by mouth every morning (before breakfast)  ipratropium-albuterol (DUONEB) 0.5-2.5 (3) MG/3ML SOLN nebulizer solution, Inhale 1 vial into the lungs every 4 hours as needed for Shortness of Breath  lidocaine (LIDODERM) 5 %, Place 1 patch onto the skin daily 12 hours on, 12 hours off. Multiple Vitamins-Minerals (CENTRUM/CERTA-MARJAN WITH MINERALS ORAL) solution, Take 15 mLs by mouth daily   naloxone 4 MG/0.1ML LIQD nasal spray, 1 spray by Nasal route as needed for Opioid Reversal  spironolactone (ALDACTONE) 50 MG tablet, Take 50 mg by mouth daily  torsemide (DEMADEX) 20 MG tablet, Take 20 mg by mouth daily  [DISCONTINUED] cetirizine (ZYRTEC) 10 MG tablet, Take 10 mg by mouth daily   [DISCONTINUED] hydrOXYzine (ATARAX) 25 MG tablet, Take 25 mg by mouth every 8 hours as needed for Itching or Anxiety  [DISCONTINUED] melatonin 3 MG TABS tablet, Take 3 mg by mouth at bedtime   [DISCONTINUED] metFORMIN (GLUCOPHAGE-XR) 500 MG extended release tablet, Take 1,000 mg by mouth in the morning and at bedtime  [DISCONTINUED] metoprolol succinate (TOPROL XL) 25 MG extended release tablet, Take 25 mg by mouth daily  [DISCONTINUED] miconazole (MICOTIN) 2 % powder, Apply topically 2 times daily Apply topically 2 times daily.   [DISCONTINUED] QUEtiapine (SEROQUEL) 300 MG tablet, Take 300 mg by mouth at bedtime   Current Medications:    Current Facility-Administered Medications: rifAXIMin (XIFAXAN) tablet 550 mg, 550 mg, Oral, BID  lidocaine 4 % external patch 1 patch, 1 patch, TransDERmal, Daily  ipratropium-albuterol (DUONEB) nebulizer solution 3 mL, 1 vial, Inhalation, Q4H PRN  metoprolol succinate (TOPROL XL) extended release tablet 25 mg, 25 mg, Oral, Daily  CENTRUM/CERTA-MARJAN with minerals oral solution 15 mL, 15 mL, Oral, Daily  vitamin D (CHOLECALCIFEROL) capsule 5,000 Units, 5,000 Units, Oral, Daily  sodium chloride flush 0.9 % injection 5-40 mL, 5-40 mL, IntraVENous, 2 times per day  sodium chloride flush 0.9 % injection 5-40 mL, 5-40 mL, IntraVENous, PRN  0.9 % sodium chloride infusion, , IntraVENous, PRN  enoxaparin (LOVENOX) injection 40 mg, 40 mg, SubCUTAneous, Daily  ondansetron (ZOFRAN-ODT) disintegrating tablet 4 mg, 4 mg, Oral, Q8H PRN **OR** ondansetron (ZOFRAN) injection 4 mg, 4 mg, IntraVENous, Q6H PRN  polyethylene glycol (GLYCOLAX) packet 17 g, 17 g, Oral, Daily PRN  cefTRIAXone (ROCEPHIN) 1000 mg IVPB in 50 mL D5W minibag, 1,000 mg, IntraVENous, Q24H  lactulose (CHRONULAC) 10 GM/15ML solution 20 g, 20 g, Oral, TID  pantoprazole (PROTONIX) injection 40 mg, 40 mg, IntraVENous, BID  insulin lispro (1 Unit Dial) 0-12 Units, 0-12 Units, SubCUTAneous, TID WC  insulin lispro (1 Unit Dial) 0-6 Units, 0-6 Units, SubCUTAneous, Nightly  glucagon (rDNA) injection 1 mg, 1 mg, IntraMUSCular, PRN  dextrose 5 % solution, 100 mL/hr, IntraVENous, PRN  dextrose bolus 10% 250 mL, 250 mL, IntraVENous, PRN  glucose chewable tablet 16 g, 4 tablet, Oral, PRN  midodrine (PROAMATINE) tablet 5 mg, 5 mg, Oral, TID WC  octreotide (SANDOSTATIN) injection 150 mcg, 150 mcg, SubCUTAneous, Q8H  albumin human 25 % IV solution 25 g, 25 g, IntraVENous, Q6H  Allergies:  Patient has no known allergies. Social History:    TOBACCO:   reports that she has quit smoking. She has never used smokeless tobacco.  ETOH:   reports previous alcohol use. DRUGS:   reports no history of drug use. Family History:   History reviewed. No pertinent family history. REVIEW OF SYSTEMS:    A comprehensive 12 point review of systems is negative except as documented above.      PHYSICAL EXAM:      Vitals:    /76   Pulse 55   Temp 97 °F (36.1 °C) (Oral)   Resp 16   Ht 5' 6\" (1.676 m)   Wt 199 lb 8 oz (90.5 kg)   LMP 03/31/1982   SpO2 95%   BMI 32.20 kg/m²     General: No acute distress; chronically ill appearing  HEENT: PERRL, EOMI, oral mucosa moist and intact, no sclericterus; tattoo across forehead  Neck: trach in place  Lymphatic: no cervical or supraclavicular lymphadenopathy  Heart: no m/r/g; +s1/s2 rrr  Lungs: CTA bilaterally  Abdomen: soft, mild ttp in epi area +BS  Extremities: 2+pulses, no edema  Skin: no rashes or lesions; spiders  Neuro: a&o x 3; no gross deficit  DATA:    Recent Labs     05/05/22  1143   WBC 11.0   HGB 13.2   HCT 39.8   MCV 86.7        Recent Labs     05/05/22  1143 05/05/22  1227   * 129*   K 9.0* 4.4   CL 89* 91*   CO2 16* 18*   BUN 61* 61*   CREATININE 2.1* 2.1*     Recent Labs     05/05/22  1143   AST 70*   ALT 12   BILIDIR 0.3   BILITOT 1.5*   ALKPHOS 157*     No results for input(s): LIPASE, AMYLASE in the last 72 hours. Recent Labs     05/05/22  1143 05/05/22  1354   PROT 9.7*  --    INR  --  2.01*     No results for input(s): PTT in the last 72 hours. No results for input(s): OCCULTBLD in the last 72 hours. Imaging: CT head; u/s 3/31  Previous endoscopy: 4/22 Nasreen Sender    IMPRESSION/RECOMMENDATIONS:      Cirrhosis  Drug abuse  EtOH abuse in past  Confustion  MOLINA  H/o ulcer    Give 24 hours of fluid and albumin to see how responds. If no response consider HRS. Hold on octreotide and midodrine for now. She already responded quite well to fluids and albumin  No obvious EtOH now but watch for signs of withdrawal  Stop opioids and other sedating meds  Lactulose and xifaxan (changed to BID) but decrease lactulose as too much diarrhea. Repeat imaging in September. Check AFP now as has not been done  Check immune status to Hep A and B  Avoid hepatotoxic meds  BID PPI  Cu/s negative for ascites  Aside from renal insuff she is back to baseline and likely able to D/C tomorrow    Thank you for allowing me to participate in NITA ALLEN [facility]'s care. [facility].  Grace Moran MD

## 2022-05-06 NOTE — PROGRESS NOTES
Patient arrived to unit lethargic, responds to verbal stimuli, denies pain at arrival. Cecilio Peyer in place and capped. 3L 02 per nasal cannula. Skin intact. No open areas or bruising noted. Patient resting with eyes closed, bed in lowest position, alarm on and functioning.

## 2022-05-06 NOTE — RT PROTOCOL NOTE
RT Nebulizer Bronchodilator Protocol Note    There is a bronchodilator order in the chart from a provider indicating to follow the RT Bronchodilator Protocol and there is an Initiate RT Bronchodilator Protocol order as well (see protocol at bottom of note). CXR Findings:  XR CHEST PORTABLE    Result Date: 5/5/2022  No acute cardiopulmonary disease. The findings from the last RT Protocol Assessment were as follows:  Smoking: None or smoker <15 pack years  Respiratory Pattern: Regular pattern and RR 12-20 bpm  Breath Sounds: Slightly diminished and/or crackles  Cough: Strong, spontaneous, non-productive  Indication for Bronchodilator Therapy:    Bronchodilator Assessment Score: 2    Aerosolized bronchodilator medication orders have been revised according to the RT Nebulizer Bronchodilator Protocol below. Respiratory Therapist to perform RT Therapy Protocol Assessment initially then follow the protocol. Repeat RT Therapy Protocol Assessment PRN for score 0-3 or on second treatment, BID, and PRN for scores above 3. No Indications - adjust the frequency to every 6 hours PRN wheezing or bronchospasm, if no treatments needed after 48 hours then discontinue using Per Protocol order mode. If indication present, adjust the RT bronchodilator orders based on the Bronchodilator Assessment Score as indicated below. If a patient is on this medication at home then do not decrease Frequency below that used at home. 0-3 - enter or revise RT bronchodilator order(s) to equivalent RT Bronchodilator order with Frequency of every 4 hours PRN for wheezing or increased work of breathing using Per Protocol order mode. 4-6 - enter or revise RT Bronchodilator order(s) to two equivalent RT bronchodilator orders with one order with BID Frequency and one order with Frequency of every 4 hours PRN wheezing or increased work of breathing using Per Protocol order mode.          7-10 - enter or revise RT Bronchodilator order(s) to two equivalent RT bronchodilator orders with one order with TID Frequency and one order with Frequency of every 4 hours PRN wheezing or increased work of breathing using Per Protocol order mode. 11-13 - enter or revise RT Bronchodilator order(s) to one equivalent RT bronchodilator order with QID Frequency and an Albuterol order with Frequency of every 4 hours PRN wheezing or increased work of breathing using Per Protocol order mode. Greater than 13 - enter or revise RT Bronchodilator order(s) to one equivalent RT bronchodilator order with every 4 hours Frequency and an Albuterol order with Frequency of every 2 hours PRN wheezing or increased work of breathing using Per Protocol order mode. RT to enter RT Home Evaluation for COPD & MDI Assessment order using Per Protocol order mode.     Electronically signed by Iftikhar Queen RCP on 5/5/2022 at 10:49 PM

## 2022-05-06 NOTE — PROGRESS NOTES
Clinical Pharmacy Progress Note  Medication History      List of of current medications patient is taking is complete. Home Medication list in EPIC updated to reflect changes noted below. Source of information: Med list from [facility]     Changes made to home medication list:   Medications removed (no longer taking):  · Cetirizine  · Gabapentin  · Hydroxyzine  · Melatonin  · Miconazole powder  · Quetiapine     Medications added:   · Trazodone  · Tizanidine     Medication doses / instructions adjusted:   · Metformin  · Metoprolol     Please call with questions--  Thanks--  Jefry Talbot, PharmD, BCPS, BCGP  [id number] ([facility])   5/6/2022 7:28 AM      Current Outpatient Medications   Medication Instructions    acetaminophen (TYLENOL) 650 mg, Oral, EVERY 4 HOURS PRN    albuterol (PROVENTIL) 2.5 mg, Nebulization, 4 TIMES DAILY    benzonatate (TESSALON) 100 mg, Oral, 3 TIMES DAILY PRN    fluticasone (FLONASE) 50 MCG/ACT nasal spray 2 sprays, Each Nostril, DAILY    glipiZIDE (GLUCOTROL) 5 mg, Oral, DAILY BEFORE BREAKFAST    ipratropium-albuterol (DUONEB) 0.5-2.5 (3) MG/3ML SOLN nebulizer solution 1 vial, Inhalation, EVERY 4 HOURS PRN    lidocaine (LIDODERM) 5 % 1 patch, TransDERmal, DAILY, 12 hours on, 12 hours off.      metFORMIN (GLUCOPHAGE) 1,000 mg, Oral, 2 TIMES DAILY WITH MEALS    metoprolol tartrate (LOPRESSOR) 25 mg, Oral, 2 TIMES DAILY    Multiple Vitamins-Minerals (CENTRUM/CERTA-MARJAN WITH MINERALS ORAL) solution 15 mLs, Oral, EVERY OTHER DAY    naloxone 4 MG/0.1ML LIQD nasal spray 1 spray, Nasal, PRN    pantoprazole (PROTONIX) 40 mg, Oral, 2 times daily    spironolactone (ALDACTONE) 50 mg, Oral, DAILY    tiZANidine (ZANAFLEX) 2 mg, Oral, DAILY    tiZANidine (ZANAFLEX) 4 mg, Oral, Nightly    torsemide (DEMADEX) 20 mg, Oral, DAILY    traZODone (DESYREL) 100 mg, Oral, NIGHTLY    vitamin D (CHOLECALCIFEROL) 5,000 Units, Oral, DAILY

## 2022-05-06 NOTE — PROGRESS NOTES
Hospitalist Progress Note      PCP: No primary care provider on file. Date of Admission: 5/5/2022    Chief Complaint: [address] Course: 59 y.o. female with a past medical history of alcoholic cirrhosis, COVID-pneumonia complicated by respiratory failure status post tracheostomy (no vent requirement), diabetes, who presents from her living facility with altered mental status and possible ingestion.  Patient currently resides in a nursing home [facility]) and was noted to be confused this morning upon awakening.  Squad also states that patient's nursing home reported a visitor around 3 AM, with concern for administering Tylenol PM.  Patient admits to taking 2 these, but denies any other ingestions today.  Patient reporting pain in her back as well as in her abdomen, she has no shortness of breath or chest pain.  No fevers that she is reporting.     Nursing facility reports she has some visitor that frequently visits her and everytime after the visitor comes she would become very lethargic and drowsy. It was reported that she was visited by the visitors again and was given narcan afterwards. But prior UDS neg for opioids. Subjective: less confused today still not to base line.  More awake       Medications:  Reviewed    Infusion Medications    sodium chloride      dextrose       Scheduled Medications    rifAXIMin  550 mg Oral BID    [START ON 5/7/2022] cefTRIAXone (ROCEPHIN) IV  2,000 mg IntraVENous Q24H    cefTRIAXone (ROCEPHIN) IV  1,000 mg IntraVENous Once    lidocaine  1 patch TransDERmal Daily    metoprolol succinate  25 mg Oral Daily    CENTRUM/CERTA-MARJAN with minerals oral  15 mL Oral Daily    vitamin D  5,000 Units Oral Daily    sodium chloride flush  5-40 mL IntraVENous 2 times per day    enoxaparin  40 mg SubCUTAneous Daily    lactulose  20 g Oral TID    pantoprazole  40 mg IntraVENous BID    insulin lispro  0-12 Units SubCUTAneous TID     insulin lispro  0-6 Units SubCUTAneous Nightly    midodrine  5 mg Oral TID     octreotide  150 mcg SubCUTAneous Q8H    albumin human  25 g IntraVENous Q6H     PRN Meds: HYDROcodone 5 mg - acetaminophen, ipratropium-albuterol, sodium chloride flush, sodium chloride, ondansetron **OR** ondansetron, polyethylene glycol, glucagon (rDNA), dextrose, dextrose bolus, glucose    No intake or output data in the 24 hours ending 05/06/22 1220    Physical Exam Performed:    /69   Pulse 56   Temp 97.7 °F (36.5 °C) (Oral)   Resp 16   Ht 5' 6\" (1.676 m)   Wt 199 lb 8 oz (90.5 kg)   LMP 03/31/1982   SpO2 95%   BMI 32.20 kg/m²     General appearance: chronically ill appearing, appears stated age and cooperative. HEENT: Pupils equal, round, and reactive to light. Conjunctivae/corneas clear. Neck: Supple, with full range of motion. No jugular venous distention. Trachea midline. Respiratory:  Normal respiratory effort. Clear to auscultation, bilaterally without Rales/Wheezes/Rhonchi. Cardiovascular: Regular rate and rhythm with normal S1/S2 without murmurs, rubs or gallops. Abdomen: Soft, non-tender, non-distended with normal bowel sounds. Musculoskeletal: No clubbing, cyanosis or edema bilaterally. Full range of motion without deformity. Skin: Skin color, texture, turgor normal.  No rashes or lesions. Neurologic:  Neurovascularly intact without any focal sensory/motor deficits.  Cranial nerves: II-XII intact, grossly non-focal.  Psychiatric: less confused today  Capillary Refill: Brisk,3 seconds, normal   Peripheral Pulses: +2 palpable, equal bilaterally       Labs:   Recent Labs     05/05/22  1143 05/06/22  0756   WBC 11.0 3.6*   HGB 13.2 11.3*   HCT 39.8 34.0*    139     Recent Labs     05/05/22  1143 05/05/22  1227 05/06/22  0756   * 129* 137   K 9.0* 4.4 4.0   CL 89* 91* 98*   CO2 16* 18* 25   BUN 61* 61* 54*   CREATININE 2.1* 2.1* 1.5*   CALCIUM 9.5 9.8 9.6     Recent Labs     05/05/22  1143 05/06/22  0756   AST 70* duodenal ulcers  - IV PPI     UTI  - was treated for UTI during last admission, previous urine culture showed klebsialla and proteus both sensitive to rocephin  - will start IV rocephin for now  - UA positive again, culture pending  - rocephin dose increased due to bacteremia     DMII with neuropathy  - sliding scale  - gabapentin reduced dose     Chronic hypoxic resp 2/2 COVID PNA s/p trach  Chronic COPD not in exacerbation  - Hx of Staph aureus, Serratia, Streno per bronchs in 11/2021 - 12/2021  - has chronic trach  - COPD not in exacerbation  - duoneb PRN     Chronic Diastolic CHF  - hold diuretics  - TTE 1/2022 showed normal EF     Obesity  - recommend weight loss     Bipolar  - seroquel reduced dose               DVT Prophylaxis: heparin  Diet: ADULT DIET;  Regular  Code Status: Full Code    PT/OT Eval Status: pending    Dispo - wards    Brandie Tucker DO

## 2022-05-06 NOTE — CARE COORDINATION
Called [facility] to ask if  They can expect patient to return to their facility from this facility and to ask how the patient is adjusting to facility. Left message to call this CM. Electronically signed by Samson Seth RN on 5/6/2022 at 3:59 PM     Addendum:  Spoke to admissions, [facility]) at [phone]. She said they will take the patient back. The visitor [facility] was interviewed by [facility] and the visitor said the only thing they gave resident before coming to the ED the day of admission was a piece of candy. Patient's drug screen came back as positive for cannabinoids. Negative for Opioids. CM will continue to follow patient until discharge.   Electronically signed by Samson Seth RN on 5/6/2022 at 4:14 PM

## 2022-05-06 NOTE — CONSULTS
Infectious Diseases Inpatient Consult Note      Reason for Consult:  Sepsis, staph aureus Bacteremia, Cirrhosis of liver      Requesting Physician:   Nai Myers DO    Primary Care Physician:  No primary care provider on file. History Obtained From:  [facility] and Patient     CHIEF COMPLAINT:     Chief Complaint   Patient presents with    Altered Mental Status    Abdominal Pain         HISTORY OF PRESENT ILLNESS:  59 y.o. woman with history of alcohol cirrhosis, history of COVID-19 pneumonia in the past requiring tracheostomy currently not requiring ventilatory support previous Trach in place resides  in the nursing home admitted to the hospital secondary to worsening confusion and fevers she was also complaining of some lower abdominal pain. Admission labs indicate creatinine elevation to 2.1 sodium at 129 lactic acid 3.7, ammonia elevated at 93, LFT elevation secondary to cirrhosis of liver, INR of 2 blood culture positive for Staph aureus MSSA from admit,   urine culture positive for E. coli and Klebsiella.   Given the ongoing sepsis we are consulted for recommendation patient is admitted to prevent any history secondary to change in mental status and encephalopathy          Past Medical History:    Past Medical History:   Diagnosis Date    CHF [facility]congestive heart failure) ([facility] [address]     Hyperlipidemia     Obesity     Tr[facility] in [facility])        Past Surgical History:    Past Surgical History:   Procedure Laterality Date    UPPER GASTROINTESTINAL ENDOSCOPY N/A 3/31/2022    EGD BIOPSY performed by Erma Gambino MD at [facility] ENDOSCOPY       Current Medications:    Outpatient Medications Marked as Taking for the 5/5/22 encounter Psychiatric Encounter)   Medication Sig Dispense Refill    metFORMIN (GLUCOPHAGE) 500 MG tablet Take 1,000 mg by mouth 2 times daily (with meals)      metoprolol tartrate (LOPRESSOR) 25 MG tablet Take 25 mg by mouth 2 times daily      traZODone (DESYREL) 100 MG tablet Take 100 mg by mouth nightly      tiZANidine (ZANAFLEX) 4 MG tablet Take 2 mg by mouth daily      tiZANidine (ZANAFLEX) 4 MG tablet Take 4 mg by mouth at bedtime         Allergies:  Patient has no known allergies.     Immunizations :   Immunization History   Administered Date(s) Administered    RIGOBERTOID-19, Liss Arriaza, Primary or Immunocompromised, PF, 100mcg/0.5mL 01/13/2022         Social History:    Social History     Tobacco Use    Smoking status: Former Smoker    Smokeless tobacco: Never Used   Substance Use Topics    Alcohol use: Not Currently    Drug use: Never     Social History     Tobacco Use   Smoking Status Former Smoker   Smokeless Tobacco Never Used      Family History : NO copd, NO liver disease      REVIEW OF SYSTEMS:     Not possible due to change in mentation     PHYSICAL EXAM:      Vitals:    BP (!) 142/77   Pulse 56   Temp 97.4 °F (36.3 °C) (Oral)   Resp 18   Ht 5' 6\" (1.676 m)   Wt 199 lb 8 oz (90.5 kg)   LMP 03/31/1982   SpO2 94%   BMI 32.20 kg/m²     General Appearance: lethargic and in some  acute distress, ++  pallor, no icterus Trach++   Skin: warm and dry, no rash or erythema  Head: normocephalic and atraumatic  Eyes: pupils equal, round, and reactive to light, conjunctivae normal  ENT: tympanic membrane, external ear and ear canal normal bilaterally, nose without deformity, nasal mucosa and turbinates normal without polyps  Neck: supple and non-tender without mass, no thyromegaly  no cervical lymphadenopathy  Pulmonary/Chest: Bi basal crepts+ wheezes, rales or rhonchi, normal air movement, in some   respiratory distress  Cardiovascular:   S1 and S2, no murmurs, rubs, clicks, or gallops, no carotid bruits  Abdomen: soft, + -tender, non-distended, normal bowel sounds, no masses or organomegaly  Extremities: no cyanosis, clubbing or edema  Musculoskeletal: normal range of motion, no joint swelling, deformity or tenderness  Integumentary: No rashes, no abnormal skin lesions, no petechiae  Neurologic: reflexes normal and symmetric, no cranial nerve deficit  Lines: IV      DATA:    CBC:   Lab Results   Component Value Date    WBC 3.6 (L) 05/06/2022    HGB 11.3 (L) 05/06/2022    HCT 34.0 (L) 05/06/2022    MCV 85.8 05/06/2022     05/06/2022     RENAL:   Lab Results   Component Value Date    CREATININE 1.5 (H) 05/06/2022    BUN 54 (H) 05/06/2022     05/06/2022    K 4.0 05/06/2022    CL 98 (L) 05/06/2022    CO2 25 05/06/2022     SED RATE: No results found for: SEDRATE  CK:   Lab Results   Component Value Date    CKTOTAL 35 03/28/2022     CRP: No results found for: CRP  Hepatic Function Panel:   Lab Results   Component Value Date    ALKPHOS 139 05/06/2022    ALT 10 05/06/2022    AST 21 05/06/2022    PROT 8.5 05/06/2022    BILITOT 1.6 05/06/2022    BILIDIR 0.3 05/05/2022    IBILI 1.2 05/05/2022    LABALBU 3.6 05/06/2022     UA:  Lab Results   Component Value Date    COLORU Yellow 05/05/2022    CLARITYU Clear 05/05/2022    GLUCOSEU Negative 05/05/2022    BILIRUBINUR Negative 05/05/2022    KETUA Negative 05/05/2022    SPECGRAV 1.010 05/05/2022    BLOODU Negative 05/05/2022    PHUR 5.0 05/05/2022    PROTEINU Negative 05/05/2022    UROBILINOGEN 0.2 05/05/2022    NITRU POSITIVE 05/05/2022    LEUKOCYTESUR SMALL 05/05/2022    LABMICR YES 05/05/2022    URINETYPE NotGiven 05/05/2022      Urine Microscopic:   Lab Results   Component Value Date    BACTERIA 4+ 05/05/2022    HYALCAST 0-2 05/05/2022    WBCUA 3-5 05/05/2022    RBCUA None seen 05/05/2022    EPIU 6-10 05/05/2022     Urine Reflex to Culture:   Lab Results   Component Value Date    URRFLXCULT Not Indicated 03/28/2022     Lactic acid  3.7         CREAT  2.1  DOWN  1.5    Status: Final result    Component Ref Range & Units 05/05/22 1331   Amphetamine Screen, Urine Negative <1000ng/mL Neg    Barbiturate Screen, Ur Negative <200 ng/mL Neg    Benzodiazepine Screen, Urine Negative <200 ng/mL Neg    Cannabinoid Scrn, Ur Negative <50 ng/mL POSITIVE Abnormal     Cocaine Metabolite Screen, Urine Negative <300 ng/mL Neg    Opiate Scrn, Ur Negative <300 ng/mL Neg    Comment: \"Therapeutic levels of pain medication, especially oxycontin and synthetic   opioids, may not be detected by this Methodology. Pain management screen   panel  Drug panel-PM-Hi Res Ur, Interp (PAIN) should be considered for drug           MICRO: cultures reviewed and updated by me          Culture, Urine [[id number]] (Abnormal) Collected: 05/05/22 1331   Order Status: Completed Specimen: Urine, clean catch Updated: 05/06/22 1235    Organism Escherichia coli Abnormal     Urine Culture, Routine --    >100,000 CFU/ml   Sensitivity to follow     Organism Klebsiella pneumoniae Abnormal     Urine Culture, Routine --    >100,000 CFU/ml   Ban@[employer] to follow    Narrative:     ORDER#: [id number]                          ORDERED BY: Geni Godinez   SOURCE: Urine Clean Catch                  COLLECTED:  05/05/22 13:31   [facility] :                      RECEIVED :  05/05/22 15:47   Culture, Blood, PCR ID Panel Results Report [[id number]] Collected: 05/05/22 1354   Order Status: Completed Updated: 05/06/22 1143    Report SEE IMAGE   Narrative:     CALL Trisha Ramirez [phone],   Microbiology results called to and read back by Naga Dueñas,   05/06/2022 11:40, by [id number]   Microbiology results called to and read back by ROBIN Holcomb, 05/06/2022   11:38, by GUY   Culture, Blood 1 [[id number]] (Abnormal) Collected: 05/05/22 1354   Order Status: Completed Specimen: Blood Updated: 05/06/22 1141    Blood Culture, Routine -- Abnormal     Gram stain Aerobic bottle:   Gram positive cocci in clusters   resembling Staphylococcus   Information to follow   Gram stain Anaerobic bottle:   Gram positive cocci in clusters   resembling Staphylococcus   Information to follow    Abnormal     Organism Staph aureus DNA Detected Abnormal     Blood Culture, Routine --    mecA gene not detected   See additional report for complete BCID panel. Narrative:     ORDER#: [id number]                          ORDERED BY: Avinash Serna   SOURCE: Blood left ac                      COLLECTED:  05/05/22 13:54   [facility] [place] :                      RECEIVED :  05/05/22 22:35   CALL  [facility]  [email or web address] tel. [phone],   Microbiology results called to and read back by Kathleen Gonzalez,   05/06/2022 11:40, by [email or web address]   Microbiology results called to and read back by ROBIN Holcomb, 05/06/2022   11:38, by [email or web address]   If child <=2 yrs old please draw pediatric bottle. ~Blood Culture 1   Culture, Blood 2 [[id number]] Collected: 05/05/22 2042   Order Status: Sent Specimen: Blood Updated: 05/06/22 0228   Culture, Urine [[id number]]    Order Status: Canceled Specimen: Urine, clean catch            Blood Culture:   Lab Results   Component Value Date    [facility]  05/05/2022     Gram stain Aerobic bottle:  Gram positive cocci in clusters  resembling Staphylococcus  Information to follow  Gram stain Anaerobic bottle:  Gram positive cocci in clusters  resembling Staphylococcus  Information to follow      [facility]  05/05/2022     mecA gene not detected  See additional report for complete BCID panel. BLOODCULT2 No Growth after 4 days of incubation.  03/28/2022       Viral Culture:    Lab Results   Component Value Date    COVID19 Not Detected 03/29/2022     Urine Culture:   Recent Labs     05/05/22  1331   LABURIN >100,000 CFU/ml  Sensitivity to follow    >100,000 CFU/ml   @Sensitivity to follow         Scheduled Meds:   rifAXIMin  550 mg Oral BID    [START ON 5/7/2022] cefTRIAXone (ROCEPHIN) IV  2,000 mg IntraVENous Q24H    lactulose  10 g Oral BID    lidocaine  1 patch TransDERmal Daily    metoprolol succinate  25 mg Oral Daily    CENTRUM/CERTA-MARJAN with minerals oral  15 mL Oral Daily    vitamin D  5,000 Units Oral Daily    sodium chloride flush  5-40 mL IntraVENous 2 times per day    enoxaparin  40 mg SubCUTAneous Daily    pantoprazole  40 mg IntraVENous BID    insulin lispro  0-12 Units SubCUTAneous TID WC    insulin lispro  0-6 Units SubCUTAneous Nightly    midodrine  5 mg Oral TID     octreotide  150 mcg SubCUTAneous Q8H    albumin human  25 g IntraVENous Q6H       Continuous Infusions:   sodium chloride      dextrose         PRN Meds:  HYDROcodone 5 mg - acetaminophen, ipratropium-albuterol, sodium chloride flush, sodium chloride, ondansetron **OR** ondansetron, polyethylene glycol, glucagon (rDNA), dextrose, dextrose bolus, glucose    Imaging:   US ABDOMEN LIMITED   Final Result      No ascites identified               CT HEAD WO CONTRAST   Final Result      No acute intracranial pathology                 XR CHEST PORTABLE   Final Result      No acute cardiopulmonary disease. All pertinent images and reports for the current Hospitalization were reviewed by me. IMPRESSION:    Patient Active Problem List   Diagnosis    Intractable abdominal pain    Acute metabolic encephalopathy    Hyperammonemia (HCC)    Hyponatremia    Azotemia    MLAENA (acute kidney injury) ([facility] [address]    Altered mental status     Sepsis  Staph aureus bacteremia  UTI  Hepatic encephalopathy  Ammonia elevation   Cirrhosis of liver  MALENA  Lactic acidosis  Lft elevation from cirrhosis of liver  INR elevation  USG liver no ascites to check for peritonitis           Labs, Microbiology, Radiology and pertinent results from current hospitalization and care every where were reviewed by me as a part of the consultation. PLAN :  1. D/C Ceftriaxone  2. Start IV Zosyn x 3.375 mg x q 8 hrs  3. Cont supportive care  4. Repeat Blood cx   5. Correct AMMONIA  3. Trend Creat       Discussed with patient/Family and Nursing   Risk of Complications/Morbidity: High      · Illness(es)/ Infection present that pose threat to bodily function. · There is potential for severe exacerbation of infection/side effects of treatment.   · Therapy requires intensive monitoring for antimicrobial agent toxicity. Thanks for allowing me to participate in your patient's care please call me with any questions or concerns.     Dr. Elsie Sylvester MD  [address] [place] [address] Physician  Phone: [phone]   Fax : [phone]

## 2022-05-06 NOTE — PROGRESS NOTES
NEUROLOGY / NEUROCRITICAL CARE PROGRESS NOTE       Patient Name: Purnima Vizcarra YOB: 1965   Sex: Female Age: 59 yrs     CC / Reason for Consult: AMS    Interval Hx / Changes over last 24 hours:     Back to neurologic baseline. She is restless and requesting pain medicine. ROS: +pain    HISTORY     Admission HPI:   Purnima Vizcarra is a 59 y.o. y/o female with CHF, HLD, Obesity, alcoholic liver disease, s/p tracheostomy after COVID-19 pneumonia (November 2021), DM2 who presents with AMS from nursing facility. Patient provides no history though will wake to answer some orientation questions and follow simple commands. Per ER notes there is some concern a visitor at her nursing facility may be providing patient with narcotics. She had a visitor this morning and afterward with very difficult to arouse, was given narcan and became restless and agitated. UDS was negative (Serum drug panel added on to ED labs - UDS will be negative for some of the synthetic opioids). Since arrive in ER patient has been restless c/o stabbing pain in lower back, given intermittent doses of dilaudid for pain control. Of note patient's sodium level is 124, ammonia is 93. BUN 61 w/ creatinine of 2.1 (new MALENA). Remains somnolent during my exam but able to answer some questions. PMH Past Medical History:   Diagnosis Date    CHF (congestive heart failure) (HCC)     Hyperlipidemia     Obesity     Tracheostomy in place [facility])       Allergies No Known Allergies   Diet ADULT DIET;  Regular   Isolation No active isolations     CURRENT SCHEDULED MEDICATIONS   Inpatient Medications     rifAXIMin, 550 mg, Oral, BID    lidocaine, 1 patch, TransDERmal, Daily    metoprolol succinate, 25 mg, Oral, Daily    CENTRUM/CERTA-MARJAN with minerals oral, 15 mL, Oral, Daily    vitamin D, 5,000 Units, Oral, Daily    sodium chloride flush, 5-40 mL, IntraVENous, 2 times per day    enoxaparin, 40 mg, SubCUTAneous, Daily   cefTRIAXone (ROCEPHIN) IV, 1,000 mg, IntraVENous, Q24H    lactulose, 20 g, Oral, TID    pantoprazole, 40 mg, IntraVENous, BID    insulin lispro, 0-12 Units, SubCUTAneous, TID WC    insulin lispro, 0-6 Units, SubCUTAneous, Nightly    midodrine, 5 mg, Oral, TID WC    octreotide, 150 mcg, SubCUTAneous, Q8H    albumin human, 25 g, IntraVENous, Q6H   Infusions    sodium chloride      dextrose        Antibiotics   Recent Abx Admin                   cefTRIAXone (ROCEPHIN) 1000 mg IVPB in 50 mL D5W minibag (mg) 1,000 mg New Bag 05/06/22 0941    rifAXIMin (XIFAXAN) tablet 550 mg (mg) 550 mg Given 05/06/22 0829    rifAXIMin (XIFAXAN) tablet 550 mg (mg) 550 mg Given 05/05/22 2132     550 mg Given  1836    cefTRIAXone (ROCEPHIN) 1000 mg IVPB in 50 mL D5W minibag (mg) 1,000 mg New Bag 05/05/22 1356                  LABS   Metabolic Panel Recent Labs     05/05/22  1143 05/05/22  1227 05/05/22  1354 05/06/22  0756   * 129*  --  137   K 9.0* 4.4  --  4.0   CL 89* 91*  --  98*   CO2 16* 18*  --  25   BUN 61* 61*  --  54*   CREATININE 2.1* 2.1*  --  1.5*   GLUCOSE 129* 139*  --  103*   CALCIUM 9.5 9.8  --  9.6   LABALBU 3.3*  --   --  3.6   ALKPHOS 157*  --   --  139*   ALT 12  --   --  10   AST 70*  --   --  21   AMMONIA  --   --  93*  --       CBC / Coags Recent Labs     05/05/22  1143 05/05/22  1354 05/06/22  0756   WBC 11.0  --  3.6*   RBC 4.59  --  3.96*   HGB 13.2  --  11.3*   HCT 39.8  --  34.0*     --  139   INR  --  2.01*  --       Other Recent Labs     05/05/22  1143   LABSALI <0.3*     Recent Labs     05/05/22  1354   LACTA 3.7*        Ammonia, 5/5/22: 93    DIAGNOSTICS   IMAGES:  Images personally reviewed and agree w/ radiology interpretation.     Head CT w/o Contrast:  No acute intracranial pathology          PHYSICAL EXAMINATION     PHYSICAL EXAM:  Vitals:    05/05/22 2245 05/06/22 0014 05/06/22 0425 05/06/22 0825   BP:  99/62 135/76 113/69   Pulse:  54 55 56   Resp: 16 16 16 16   Temp:  98.2 °F (36.8 °C) 97 °F (36.1 °C) 97.7 °F (36.5 °C)   TempSrc:  Oral Oral Oral   SpO2: 96% 97% 95% 95%   Weight:       Height:             General: Alert, diaphoretic, restless. Neurologic  Mental status:   orientation to person, place, time, situation   Attention intact as able to attend well to the exam     Language fluent in conversation   Comprehension intact; follows simple commands    Cranial nerves:   CN2: Visual fields full w/o extinction on confrontational testing   CN 3,4,6: Pupils equal and reactive to light, extraocular muscles intact  CN5: Facial sensation symmetric   CN7: Face symmetric bilaterally   CN8: Hearing symmetric to spoken voice  CN9: Palate elevated symmetrically  CN11: Traps full strength on shoulder shrug  CN12: Tongue midline with protrusion    Motor Exam:  Strong and symmetric in the bilateral upper and lower extremities     OTHER SYSTEMS:  Cardiovascular: Warm, appears well perfused   Respiratory: Easy, non-labored respiratory pattern   Abdominal: Abdomen is without distention   Extremities: Upper and lower extremities are atraumatic in appearance without deformity. No swelling or erythema. ASSESSMENT & RECOMMENDATIONS     Assessment:Patient is a 60 y/o F w/ multiple medical co-morbidities and multiple medical derangements on lab work. Suspect AMS is mostly related to new hyponatremia, elevated BUN/Crt and elevated ammonia versus possible illicit drug ingestion from visitor at nursing home.     She is completely back to baseline and she is restless and agitated with dilated pupils which could indicate opiate withdrawal.      Plan:  -management of medical issues per primary team  -No further inpatient neurologic workup, we will sign off      [address] [facility]   Neurology & Neurocritical Care   Neurology Line: [phone]  [employer]: [facility] Neurology & Neuro Critical Care NPs  5/6/2022 10:44 AM

## 2022-05-07 LAB
A/G RATIO: 0.9 (ref 1.1–2.2)
ALBUMIN SERPL-MCNC: 4.2 G/DL (ref 3.4–5)
ALP BLD-CCNC: 140 U/L (ref 40–129)
ALT SERPL-CCNC: 12 U/L (ref 10–40)
ANION GAP SERPL CALCULATED.3IONS-SCNC: 15 MMOL/L (ref 3–16)
AST SERPL-CCNC: 22 U/L (ref 15–37)
BASOPHILS ABSOLUTE: 0 K/UL (ref 0–0.2)
BASOPHILS RELATIVE PERCENT: 0.7 %
BILIRUB SERPL-MCNC: 1.4 MG/DL (ref 0–1)
BUN BLDV-MCNC: 35 MG/DL (ref 7–20)
CALCIUM SERPL-MCNC: 10.2 MG/DL (ref 8.3–10.6)
CHLORIDE BLD-SCNC: 99 MMOL/L (ref 99–110)
CO2: 24 MMOL/L (ref 21–32)
CREAT SERPL-MCNC: 1.4 MG/DL (ref 0.6–1.1)
EOSINOPHILS ABSOLUTE: 0.1 K/UL (ref 0–0.6)
EOSINOPHILS RELATIVE PERCENT: 2.6 %
GFR AFRICAN AMERICAN: 47
GFR NON-AFRICAN AMERICAN: 38
GLUCOSE BLD-MCNC: 137 MG/DL (ref 70–99)
GLUCOSE BLD-MCNC: 147 MG/DL (ref 70–99)
GLUCOSE BLD-MCNC: 148 MG/DL (ref 70–99)
GLUCOSE BLD-MCNC: 153 MG/DL (ref 70–99)
GLUCOSE BLD-MCNC: 164 MG/DL (ref 70–99)
HAV AB SERPL IA-ACNC: NEGATIVE
HCT VFR BLD CALC: 37.2 % (ref 36–48)
HEMOGLOBIN: 12.4 G/DL (ref 12–16)
LYMPHOCYTES ABSOLUTE: 0.6 K/UL (ref 1–5.1)
LYMPHOCYTES RELATIVE PERCENT: 11.1 %
MAGNESIUM: 1.6 MG/DL (ref 1.8–2.4)
MCH RBC QN AUTO: 28.8 PG (ref 26–34)
MCHC RBC AUTO-ENTMCNC: 33.3 G/DL (ref 31–36)
MCV RBC AUTO: 86.3 FL (ref 80–100)
MONOCYTES ABSOLUTE: 0.6 K/UL (ref 0–1.3)
MONOCYTES RELATIVE PERCENT: 10.8 %
NEUTROPHILS ABSOLUTE: 4.1 K/UL (ref 1.7–7.7)
NEUTROPHILS RELATIVE PERCENT: 74.8 %
PDW BLD-RTO: 14.8 % (ref 12.4–15.4)
PERFORMED ON: ABNORMAL
PLATELET # BLD: 150 K/UL (ref 135–450)
PMV BLD AUTO: 7.8 FL (ref 5–10.5)
POTASSIUM REFLEX MAGNESIUM: 3.4 MMOL/L (ref 3.5–5.1)
RBC # BLD: 4.31 M/UL (ref 4–5.2)
SODIUM BLD-SCNC: 138 MMOL/L (ref 136–145)
TOTAL PROTEIN: 9.1 G/DL (ref 6.4–8.2)
WBC # BLD: 5.5 K/UL (ref 4–11)

## 2022-05-07 PROCEDURE — 2580000003 HC RX 258: Performed by: INTERNAL MEDICINE

## 2022-05-07 PROCEDURE — 80053 COMPREHEN METABOLIC PANEL: CPT

## 2022-05-07 PROCEDURE — 99233 SBSQ HOSP IP/OBS HIGH 50: CPT | Performed by: INTERNAL MEDICINE

## 2022-05-07 PROCEDURE — 85025 COMPLETE CBC W/AUTO DIFF WBC: CPT

## 2022-05-07 PROCEDURE — 6370000000 HC RX 637 (ALT 250 FOR IP): Performed by: INTERNAL MEDICINE

## 2022-05-07 PROCEDURE — 36415 COLL VENOUS BLD VENIPUNCTURE: CPT

## 2022-05-07 PROCEDURE — 6360000002 HC RX W HCPCS: Performed by: INTERNAL MEDICINE

## 2022-05-07 PROCEDURE — 87040 BLOOD CULTURE FOR BACTERIA: CPT

## 2022-05-07 PROCEDURE — 90632 HEPA VACCINE ADULT IM: CPT | Performed by: INTERNAL MEDICINE

## 2022-05-07 PROCEDURE — 2700000000 HC OXYGEN THERAPY PER DAY

## 2022-05-07 PROCEDURE — P9047 ALBUMIN (HUMAN), 25%, 50ML: HCPCS | Performed by: INTERNAL MEDICINE

## 2022-05-07 PROCEDURE — 83735 ASSAY OF MAGNESIUM: CPT

## 2022-05-07 PROCEDURE — 1200000000 HC SEMI PRIVATE

## 2022-05-07 PROCEDURE — 90472 IMMUNIZATION ADMIN EACH ADD: CPT

## 2022-05-07 PROCEDURE — 90471 IMMUNIZATION ADMIN: CPT | Performed by: INTERNAL MEDICINE

## 2022-05-07 PROCEDURE — G0010 ADMIN HEPATITIS B VACCINE: HCPCS | Performed by: INTERNAL MEDICINE

## 2022-05-07 PROCEDURE — 90740 HEPB VACC 3 DOSE IMMUNSUP IM: CPT | Performed by: INTERNAL MEDICINE

## 2022-05-07 PROCEDURE — C9113 INJ PANTOPRAZOLE SODIUM, VIA: HCPCS | Performed by: INTERNAL MEDICINE

## 2022-05-07 PROCEDURE — 94761 N-INVAS EAR/PLS OXIMETRY MLT: CPT

## 2022-05-07 RX ORDER — LACTULOSE 10 G/15ML
10 SOLUTION ORAL DAILY
Status: DISCONTINUED | OUTPATIENT
Start: 2022-05-08 | End: 2022-05-17 | Stop reason: HOSPADM

## 2022-05-07 RX ORDER — OXYCODONE HYDROCHLORIDE 5 MG/1
5 TABLET ORAL EVERY 4 HOURS PRN
Status: DISCONTINUED | OUTPATIENT
Start: 2022-05-07 | End: 2022-05-08

## 2022-05-07 RX ORDER — ALBUMIN (HUMAN) 12.5 G/50ML
12.5 SOLUTION INTRAVENOUS EVERY 6 HOURS
Status: COMPLETED | OUTPATIENT
Start: 2022-05-07 | End: 2022-05-08

## 2022-05-07 RX ADMIN — ONDANSETRON 4 MG: 2 INJECTION INTRAMUSCULAR; INTRAVENOUS at 08:49

## 2022-05-07 RX ADMIN — PANTOPRAZOLE SODIUM 40 MG: 40 INJECTION, POWDER, FOR SOLUTION INTRAVENOUS at 20:07

## 2022-05-07 RX ADMIN — MIDODRINE HYDROCHLORIDE 5 MG: 5 TABLET ORAL at 17:17

## 2022-05-07 RX ADMIN — INSULIN LISPRO 1 UNITS: 100 INJECTION, SOLUTION INTRAVENOUS; SUBCUTANEOUS at 20:10

## 2022-05-07 RX ADMIN — MICONAZOLE NITRATE: 20 CREAM TOPICAL at 20:08

## 2022-05-07 RX ADMIN — OXYCODONE 5 MG: 5 TABLET ORAL at 17:17

## 2022-05-07 RX ADMIN — PANTOPRAZOLE SODIUM 40 MG: 40 INJECTION, POWDER, FOR SOLUTION INTRAVENOUS at 08:31

## 2022-05-07 RX ADMIN — METOPROLOL SUCCINATE 25 MG: 25 TABLET, FILM COATED, EXTENDED RELEASE ORAL at 08:32

## 2022-05-07 RX ADMIN — HEPATITIS B VACCINE (RECOMBINANT) 1 ML: 20 INJECTION, SUSPENSION INTRAMUSCULAR at 14:07

## 2022-05-07 RX ADMIN — OCTREOTIDE ACETATE 150 MCG: 100 INJECTION, SOLUTION INTRAVENOUS; SUBCUTANEOUS at 08:31

## 2022-05-07 RX ADMIN — OCTREOTIDE ACETATE 150 MCG: 100 INJECTION, SOLUTION INTRAVENOUS; SUBCUTANEOUS at 17:21

## 2022-05-07 RX ADMIN — RIFAXIMIN 550 MG: 550 TABLET ORAL at 20:07

## 2022-05-07 RX ADMIN — OXYCODONE 5 MG: 5 TABLET ORAL at 23:04

## 2022-05-07 RX ADMIN — MIDODRINE HYDROCHLORIDE 5 MG: 5 TABLET ORAL at 08:32

## 2022-05-07 RX ADMIN — HYDROCODONE BITARTRATE AND ACETAMINOPHEN 1 TABLET: 5; 325 TABLET ORAL at 07:15

## 2022-05-07 RX ADMIN — SODIUM CHLORIDE, PRESERVATIVE FREE 10 ML: 5 INJECTION INTRAVENOUS at 20:08

## 2022-05-07 RX ADMIN — SODIUM CHLORIDE, PRESERVATIVE FREE 10 ML: 5 INJECTION INTRAVENOUS at 09:23

## 2022-05-07 RX ADMIN — Medication 5000 UNITS: at 08:32

## 2022-05-07 RX ADMIN — PIPERACILLIN AND TAZOBACTAM 3375 MG: 3; .375 INJECTION, POWDER, LYOPHILIZED, FOR SOLUTION INTRAVENOUS at 22:01

## 2022-05-07 RX ADMIN — Medication 15 ML: at 08:56

## 2022-05-07 RX ADMIN — HEPATITIS A VACCINE 1440 UNITS: 1440 INJECTION, SUSPENSION INTRAMUSCULAR at 14:05

## 2022-05-07 RX ADMIN — ALBUMIN (HUMAN) 12.5 G: 0.25 INJECTION, SOLUTION INTRAVENOUS at 20:19

## 2022-05-07 RX ADMIN — MIDODRINE HYDROCHLORIDE 5 MG: 5 TABLET ORAL at 14:00

## 2022-05-07 RX ADMIN — RIFAXIMIN 550 MG: 550 TABLET ORAL at 09:00

## 2022-05-07 RX ADMIN — INSULIN LISPRO 2 UNITS: 100 INJECTION, SOLUTION INTRAVENOUS; SUBCUTANEOUS at 08:50

## 2022-05-07 RX ADMIN — PIPERACILLIN AND TAZOBACTAM 3375 MG: 3; .375 INJECTION, POWDER, LYOPHILIZED, FOR SOLUTION INTRAVENOUS at 14:20

## 2022-05-07 RX ADMIN — MICONAZOLE NITRATE: 20 CREAM TOPICAL at 09:24

## 2022-05-07 RX ADMIN — OCTREOTIDE ACETATE 150 MCG: 100 INJECTION, SOLUTION INTRAVENOUS; SUBCUTANEOUS at 01:14

## 2022-05-07 RX ADMIN — PIPERACILLIN AND TAZOBACTAM 3375 MG: 3; .375 INJECTION, POWDER, LYOPHILIZED, FOR SOLUTION INTRAVENOUS at 05:28

## 2022-05-07 RX ADMIN — ENOXAPARIN SODIUM 40 MG: 100 INJECTION SUBCUTANEOUS at 08:31

## 2022-05-07 RX ADMIN — ONDANSETRON 4 MG: 2 INJECTION INTRAMUSCULAR; INTRAVENOUS at 17:21

## 2022-05-07 ASSESSMENT — PAIN SCALES - GENERAL
PAINLEVEL_OUTOF10: 8

## 2022-05-07 ASSESSMENT — PAIN DESCRIPTION - DESCRIPTORS
DESCRIPTORS: SHARP
DESCRIPTORS: THROBBING

## 2022-05-07 ASSESSMENT — PAIN DESCRIPTION - LOCATION
LOCATION: BACK
LOCATION: BACK

## 2022-05-07 NOTE — PROGRESS NOTES
Nephrology  Note                                                                                                                                                                                                                                                                                                                                                               Office : [phone]     Fax :[phone]              Patient's Name: NITA Etienne    Good uO   Cr better   No N/V/D     Past Medical History:   Diagnosis Date    CHF (congestive heart failure) ([address]     Hyperlipidemia     Obesity     Tracheostomy in place [facility])        Past Surgical History:   Procedure Laterality Date    UPPER GASTROINTESTINAL ENDOSCOPY N/A 3/31/2022    EGD BIOPSY performed by Edil Haywood MD at [facility] reviewed. No pertinent family history. reports that she has quit smoking. She has never used smokeless tobacco. She reports previous alcohol use. She reports that she does not use drugs. Allergies:  Patient has no known allergies.     Current Medications:    rifAXIMin (XIFAXAN) tablet 550 mg, BID  HYDROcodone-acetaminophen (NORCO) 5-325 MG per tablet 1 tablet, Q6H PRN  lactulose (CHRONULAC) 10 GM/15ML solution 10 g, BID  piperacillin-tazobactam (ZOSYN) 3,375 mg in dextrose 5 % 50 mL IVPB extended infusion (mini-bag), Q8H  miconazole (MICOTIN) 2 % cream, BID  lidocaine 4 % external patch 1 patch, Daily  ipratropium-albuterol (DUONEB) nebulizer solution 3 mL, Q4H PRN  metoprolol succinate (TOPROL XL) extended release tablet 25 mg, Daily  CENTRUM/CERTA-MARJAN with minerals oral solution 15 mL, Daily  vitamin D (CHOLECALCIFEROL) capsule 5,000 Units, Daily  sodium chloride flush 0.9 % injection 5-40 mL, 2 times per day  sodium chloride flush 0.9 % injection 5-40 mL, PRN  0.9 % sodium chloride infusion, PRN  enoxaparin (LOVENOX) injection 40 mg, Daily  ondansetron (ZOFRAN-ODT) disintegrating tablet 4 mg, Q8H PRN   Or  ondansetron (ZOFRAN) injection 4 mg, Q6H PRN  polyethylene glycol (GLYCOLAX) packet 17 g, Daily PRN  pantoprazole (PROTONIX) injection 40 mg, BID  insulin lispro (1 Unit Dial) 0-12 Units, TID WC  insulin lispro (1 Unit Dial) 0-6 Units, Nightly  glucagon (rDNA) injection 1 mg, PRN  dextrose 5 % solution, PRN  dextrose bolus 10% 250 mL, PRN  glucose chewable tablet 16 g, PRN  midodrine (PROAMATINE) tablet 5 mg, TID WC  octreotide (SANDOSTATIN) injection 150 mcg, Q8H        Review of Systems:   14 point ROS obtained but were negative except mentioned in HPI      Physical exam:     Vitals:  /77   Pulse 58   Temp 97.1 °F (36.2 °C) (Oral)   Resp 17   Ht 5' 6\" (1.676 m)   Wt 199 lb 8 oz (90.5 kg)   LMP 03/31/1982   SpO2 97%   BMI 32.20 kg/m²   Constitutional:  OAA X3 NAD  Skin: no rash, turgor wnl  Heent:  eomi, mmm  Neck: no bruits or jvd noted  Cardiovascular:  S1, S2 without m/r/g  Respiratory: dec BS   Abdomen:  +bs, soft, nt, nd  Ext: ++ lower extremity edema  Psychiatric: mood and affect flat   Musculoskeletal:  Rom, muscular strength intact    Data:   Labs:  CBC:   Recent Labs     05/05/22  1143 05/06/22  0756   WBC 11.0 3.6*   HGB 13.2 11.3*    139     BMP:    Recent Labs     05/05/22  1143 05/05/22  1227 05/06/22  0756   * 129* 137   K 9.0* 4.4 4.0   CL 89* 91* 98*   CO2 16* 18* 25   BUN 61* 61* 54*   CREATININE 2.1* 2.1* 1.5*   GLUCOSE 129* 139* 103*     Ca/Mg/Phos:   Recent Labs     05/05/22  1143 05/05/22  1227 05/06/22  0756   CALCIUM 9.5 9.8 9.6     Hepatic:   Recent Labs     05/05/22  1143 05/06/22  0756   AST 70* 21   ALT 12 10   BILITOT 1.5* 1.6*   ALKPHOS 157* 139*     Troponin: No results for input(s): TROPONINI in the last 72 hours. BNP: No results for input(s): BNP in the last 72 hours. Lipids: No results for input(s): CHOL, TRIG, HDL, LDLCALC, LABVLDL in the last 72 hours. ABGs: No results for input(s): PHART, PO2ART, IBW4RZD in the last 72 hours.   INR:   Recent Labs     05/05/22  1354   INR 2.01*     UA:  Recent Labs     05/05/22  1207 05/05/22  1207 05/05/22  1331   COLORU Yellow  --   --    CLARITYU Clear  --   --    [place] [address] Negative  --   --    BILIRUBINUR Negative  --   --    KETUA Negative  --   --    SPECGRAV 1.010  --   --    BLOODU Negative  --   --    PHUR 6.0   < > 5.0   PROTEINU Negative  --   --    UROBILINOGEN 0.2  --   --    NITRU POSITIVE*  --   --    LEUKOCYTESUR SMALL*  --   --    LABMICR YES  --   --    URINETYPE NotGiven  --   --     < > = values in this interval not displayed. Urine Microscopic:   Recent Labs     05/05/22  1207   BACTERIA 4+*   HYALCAST 0-2   WBCUA 3-5   RBCUA None seen   EPIU 6-10*     Urine Culture:   Recent Labs     05/05/22  1331   LABURIN >100,000 CFU/ml  Sensitivity to follow    >100,000 CFU/ml   @Sensitivity to follow       Urine Chemistry: No results for input(s): CLUR, LABCREA, PROTEINUR, NAUR in the last 72 hours. IMAGING:  US ABDOMEN LIMITED   Final Result      No ascites identified               CT HEAD WO CONTRAST   Final Result      No acute intracranial pathology                 XR CHEST PORTABLE   Final Result      No acute cardiopulmonary disease. Assessment/Plan   1. AMS     2. Alc cirrhosis     3. Anemia    4. Acid- base/ Electrolyte imbalance     5.  MALENA     Plan   - octreotide + Midodrine + albumin  - Hold diuretics    - Ur studies - reviewed   - Monitor UO and renal function   - labs in am   - Chronic hypoxic resp 2/2 COVID PNA s/p trach    Recommend to dose adjust all medications  based on renal functions  Maintain SBP> 90 mmHg   Daily weights   AVOID NSAIDs  Avoid Nephrotoxins  Monitor Intake/Output  Call if significant decrease in urine output                 Thank you for allowing us to participate in care of Jorge Townsend MD  Feel free to contact me   [facility] [address]  Office : [phone]  Fax :[phone]

## 2022-05-07 NOTE — PROGRESS NOTES
Hospitalist Progress Note      PCP: No primary care provider on file. Date of Admission: 5/5/2022    Chief Complaint: [address] Course: 59 y.o. female with a past medical history of alcoholic cirrhosis, COVID-pneumonia complicated by respiratory failure status post tracheostomy (no vent requirement), diabetes, who presents from her living facility with altered mental status and possible ingestion.  Patient currently resides in a nursing home [facility]) and was noted to be confused this morning upon awakening.  Squad also states that patient's nursing home reported a visitor around 3 AM, with concern for administering Tylenol PM.  Patient admits to taking 2 these, but denies any other ingestions today.  Patient reporting pain in her back as well as in her abdomen, she has no shortness of breath or chest pain.  No fevers that she is reporting.     Nursing facility reports she has some visitor that frequently visits her and everytime after the visitor comes she would become very lethargic and drowsy. It was reported that she was visited by the visitors again and was given narcan afterwards. But prior UDS neg for opioids. Subjective: less confused today still not to base line.  Still has bad back pain       Medications:  Reviewed    Infusion Medications    sodium chloride      dextrose       Scheduled Medications    hepatitis B vaccine (ADULT)  1 mL IntraMUSCular Once    hepatitis A vaccine  1 each IntraMUSCular Once    [START ON 5/8/2022] lactulose  10 g Oral Daily    rifAXIMin  550 mg Oral BID    piperacillin-tazobactam  3,375 mg IntraVENous Q8H    miconazole   Topical BID    lidocaine  1 patch TransDERmal Daily    metoprolol succinate  25 mg Oral Daily    CENTRUM/CERTA-MARJAN with minerals oral  15 mL Oral Daily    vitamin D  5,000 Units Oral Daily    sodium chloride flush  5-40 mL IntraVENous 2 times per day    enoxaparin  40 mg SubCUTAneous Daily    pantoprazole  40 mg IntraVENous BID    insulin lispro  0-12 Units SubCUTAneous TID WC    insulin lispro  0-6 Units SubCUTAneous Nightly    midodrine  5 mg Oral TID WC    octreotide  150 mcg SubCUTAneous Q8H     PRN Meds: oxyCODONE, ipratropium-albuterol, sodium chloride flush, sodium chloride, ondansetron **OR** ondansetron, polyethylene glycol, glucagon (rDNA), dextrose, dextrose bolus, glucose    No intake or output data in the 24 hours ending 05/07/22 1320    Physical Exam Performed:    /77   Pulse 68   Temp 97.5 °F (36.4 °C) (Oral)   Resp 16   Ht 5' 6\" (1.676 m)   Wt 199 lb 8 oz (90.5 kg)   LMP 03/31/1982   SpO2 94%   BMI 32.20 kg/m²     General appearance: chronically ill appearing, appears stated age and cooperative. HEENT: Pupils equal, round, and reactive to light. Conjunctivae/corneas clear. Neck: Supple, with full range of motion. No jugular venous distention. Trachea midline. Respiratory:  Normal respiratory effort. Clear to auscultation, bilaterally without Rales/Wheezes/Rhonchi. Cardiovascular: Regular rate and rhythm with normal S1/S2 without murmurs, rubs or gallops. Abdomen: Soft, non-tender, non-distended with normal bowel sounds. Musculoskeletal: No clubbing, cyanosis or edema bilaterally. Full range of motion without deformity. Skin: Skin color, texture, turgor normal.  No rashes or lesions. Neurologic:  Neurovascularly intact without any focal sensory/motor deficits.  Cranial nerves: II-XII intact, grossly non-focal.  Psychiatric: less confused today  Capillary Refill: Brisk,3 seconds, normal   Peripheral Pulses: +2 palpable, equal bilaterally       Labs:   Recent Labs     05/05/22  1143 05/06/22  0756 05/07/22  0919   WBC 11.0 3.6* 5.5   HGB 13.2 11.3* 12.4   HCT 39.8 34.0* 37.2    139 150     Recent Labs     05/05/22  1227 05/06/22  0756 05/07/22  0919   * 137 138   K 4.4 4.0 3.4*   CL 91* 98* 99   CO2 18* 25 24   BUN 61* 54* 35*   CREATININE 2.1* 1.5* 1.4*   CALCIUM 9.8 9.6 10.2     Recent Labs     05/05/22  1143 05/06/22  0756 05/07/22  0919   AST 70* 21 22   ALT 12 10 12   BILIDIR 0.3  --   --    BILITOT 1.5* 1.6* 1.4*   ALKPHOS 157* 139* 140*     Recent Labs     05/05/22  1354   INR 2.01*     No results for input(s): CKTOTAL, TROPONINI in the last 72 hours. Urinalysis:      Lab Results   Component Value Date    NITRU POSITIVE 05/05/2022    WBCUA 3-5 05/05/2022    BACTERIA 4+ 05/05/2022    RBCUA None seen 05/05/2022    BLOODU Negative 05/05/2022    SPECGRAV 1.010 05/05/2022    GLUCOSEU Negative 05/05/2022       Radiology:  US ABDOMEN LIMITED   Final Result      No ascites identified               CT HEAD WO CONTRAST   Final Result      No acute intracranial pathology                 XR CHEST PORTABLE   Final Result      No acute cardiopulmonary disease. Assessment/Plan:    Active Hospital Problems    Diagnosis     Acute metabolic encephalopathy [N10.78]      Priority: Medium    Hyperammonemia ([facility] [address] [E72.20]      Priority: Medium    Hyponatremia [E87.1]      Priority: Medium    Azotemia [R79.89]      Priority: Medium    MALENA (acute kidney injury) ([facility] [address] [N17.9]      Priority: Medium    Altered mental status [R41.82]      Priority: Medium     Acute metabolic encephalopathy likely 2/2 hepatic encephalopathy  Drug induced encephalopathy is another possibility  Alcoholic cirrhosis  - nursing home report there is a visitor that frequently sees her and every time that visitor left she becomes more confused and lethargic however UDS neg for opioids. But she was given narcan prior to transfer to hospital  - HCV abx negative (1/13/22), HBV negative (not vaccinated)  - ammonia is elevated to 93  - IVF  - lactulose and rifaximin  - consulted neurology  - consulted GI  - stopped gabapentin and seroquel    MSSA bacteremia  UTI (klebsiella and e.  Coli 5/5/22)  - Rocephin dose increased to 2 G  - consulted ID  - ECHO neg  - IV zosyn  - repeat blood culture pending     MALENA improving  Hyponatremia  - likely pre-renal  - holding spironolactone and torsemide  - IVF/albumin  - consulted nephrology    PUD  - March 2022 EGD showed 3 clean based antral ulcers and duodenal ulcers  - IV PPI  - will need follow up with Dr. Jeremias Zhao     UTI  - was treated for UTI during last admission, previous urine culture showed klebsialla and proteus both sensitive to rocephin  - will start IV rocephin for now  - UA positive again, culture showed klebsiella and e.coli  - rocephin dose increased due to bacteremia  - switched to IV zosyn per ID     DMII with neuropathy  - sliding scale  - gabapentin reduced dose     Chronic hypoxic resp 2/2 COVID PNA s/p trach  Chronic COPD not in exacerbation  - Hx of Staph aureus, Serratia, Streno per bronchs in 11/2021 - 12/2021  - has chronic trach  - COPD not in exacerbation  - duoneb PRN     Chronic Diastolic CHF  - hold diuretics  - TTE 1/2022 showed normal EF     Obesity  - recommend weight loss     Bipolar  - seroquel reduced dose               DVT Prophylaxis: heparin  Diet: ADULT DIET;  Regular  Code Status: Full Code    PT/OT Eval Status: pending    Dispo - wards    Ilan Palumbo DO

## 2022-05-07 NOTE — PROGRESS NOTES
Nephrology  Note                                                                                                                                                                                                                                                                                                                                                               Office : [phone]     Fax :[phone]              Patient's Name: NITA Grahames    Good uO   Cr better   No N/V/D     Past Medical History:   Diagnosis Date    CHF (congestive heart failure) ([address]     Hyperlipidemia     Obesity     Tracheostomy in place [facility])        Past Surgical History:   Procedure Laterality Date    UPPER GASTROINTESTINAL ENDOSCOPY N/A 3/31/2022    EGD BIOPSY performed by Megan Bowser MD at [facility] reviewed. No pertinent family history. reports that she has quit smoking. She has never used smokeless tobacco. She reports previous alcohol use. She reports that she does not use drugs. Allergies:  Patient has no known allergies.     Current Medications:    [START ON 5/8/2022] lactulose (CHRONULAC) 10 GM/15ML solution 10 g, Daily  oxyCODONE (ROXICODONE) immediate release tablet 5 mg, Q4H PRN  rifAXIMin (XIFAXAN) tablet 550 mg, BID  piperacillin-tazobactam (ZOSYN) 3,375 mg in dextrose 5 % 50 mL IVPB extended infusion (mini-bag), Q8H  miconazole (MICOTIN) 2 % cream, BID  lidocaine 4 % external patch 1 patch, Daily  ipratropium-albuterol (DUONEB) nebulizer solution 3 mL, Q4H PRN  metoprolol succinate (TOPROL XL) extended release tablet 25 mg, Daily  CENTRUM/CERTA-MARJAN with minerals oral solution 15 mL, Daily  vitamin D (CHOLECALCIFEROL) capsule 5,000 Units, Daily  sodium chloride flush 0.9 % injection 5-40 mL, 2 times per day  sodium chloride flush 0.9 % injection 5-40 mL, PRN  0.9 % sodium chloride infusion, PRN  enoxaparin (LOVENOX) injection 40 mg, Daily  ondansetron (ZOFRAN-ODT) disintegrating tablet 4 mg, Q8H PRN   Or  ondansetron (ZOFRAN) injection 4 mg, Q6H PRN  polyethylene glycol (GLYCOLAX) packet 17 g, Daily PRN  pantoprazole (PROTONIX) injection 40 mg, BID  insulin lispro (1 Unit Dial) 0-12 Units, TID WC  insulin lispro (1 Unit Dial) 0-6 Units, Nightly  glucagon (rDNA) injection 1 mg, PRN  dextrose 5 % solution, PRN  dextrose bolus 10% 250 mL, PRN  glucose chewable tablet 16 g, PRN  midodrine (PROAMATINE) tablet 5 mg, TID WC  octreotide (SANDOSTATIN) injection 150 mcg, Q8H        Review of Systems:   14 point ROS obtained but were negative except mentioned in HPI      Physical exam:     Vitals:  /77   Pulse 54   Temp 97.2 °F (36.2 °C) (Oral)   Resp 16   Ht 5' 6\" (1.676 m)   Wt 199 lb 8 oz (90.5 kg)   LMP 03/31/1982   SpO2 98%   BMI 32.20 kg/m²   Constitutional:  OAA X3 NAD  Skin: no rash, turgor wnl  Heent:  eomi, mmm  Neck: no bruits or jvd noted  Cardiovascular:  S1, S2 without m/r/g  Respiratory: dec BS   Abdomen:  +bs, soft, nt, nd  Ext: ++ lower extremity edema  Psychiatric: mood and affect flat   Musculoskeletal:  Rom, muscular strength intact    Data:   Labs:  CBC:   Recent Labs     05/05/22  1143 05/06/22  0756 05/07/22  0919   WBC 11.0 3.6* 5.5   HGB 13.2 11.3* 12.4    139 150     BMP:    Recent Labs     05/05/22  1227 05/06/22  0756 05/07/22  0919   * 137 138   K 4.4 4.0 3.4*   CL 91* 98* 99   CO2 18* 25 24   BUN 61* 54* 35*   CREATININE 2.1* 1.5* 1.4*   GLUCOSE 139* 103* 153*     Ca/Mg/Phos:   Recent Labs     05/05/22  1227 05/06/22  0756 05/07/22  0919   CALCIUM 9.8 9.6 10.2   MG  --   --  1.60*     Hepatic:   Recent Labs     05/05/22  1143 05/06/22  0756 05/07/22  0919   AST 70* 21 22   ALT 12 10 12   BILITOT 1.5* 1.6* 1.4*   ALKPHOS 157* 139* 140*     Troponin: No results for input(s): TROPONINI in the last 72 hours. BNP: No results for input(s): BNP in the last 72 hours.   Lipids: No results for input(s): CHOL, TRIG, HDL, LDLCALC, LABVLDL in the last 72 hours.  ABGs: No results for input(s): PHART, PO2ART, MQV8MIG in the last 72 hours. INR:   Recent Labs     05/05/22  1354   INR 2.01*     UA:  Recent Labs     05/05/22  1207 05/05/22  1207 05/05/22  1331   COLORU Yellow  --   --    CLARITYU Clear  --   --    [place] [address] Negative  --   --    BILIRUBINUR Negative  --   --    KETUA Negative  --   --    SPECGRAV 1.010  --   --    BLOODU Negative  --   --    PHUR 6.0   < > 5.0   PROTEINU Negative  --   --    UROBILINOGEN 0.2  --   --    NITRU POSITIVE*  --   --    LEUKOCYTESUR SMALL*  --   --    LABMICR YES  --   --    URINETYPE NotGiven  --   --     < > = values in this interval not displayed. Urine Microscopic:   Recent Labs     05/05/22  1207   BACTERIA 4+*   HYALCAST 0-2   WBCUA 3-5   RBCUA None seen   EPIU 6-10*     Urine Culture:   Recent Labs     05/05/22  1331   LABURIN >100,000 CFU/ml  >100,000 CFU/ml  Sensitivity to follow  Repeating sensitivity       Urine Chemistry: No results for input(s): CLUR, LABCREA, PROTEINUR, NAUR in the last 72 hours. IMAGING:  US ABDOMEN LIMITED   Final Result      No ascites identified               CT HEAD WO CONTRAST   Final Result      No acute intracranial pathology                 XR CHEST PORTABLE   Final Result      No acute cardiopulmonary disease. Assessment/Plan   1. AMS     2. Alc cirrhosis     3. Anemia    4. Acid- base/ Electrolyte imbalance     5.  MALENA     Plan   - octreotide + Midodrine + albumin  - Hold diuretics    - Ur studies - reviewed   - Monitor UO and renal function   - labs in am   - Chronic hypoxic resp 2/2 COVID PNA s/p trach    Recommend to dose adjust all medications  based on renal functions  Maintain SBP> 90 mmHg   Daily weights   AVOID NSAIDs  Avoid Nephrotoxins  Monitor Intake/Output  Call if significant decrease in urine output                 Thank you for allowing us to participate in care of Carole Goodell, MD  Feel free to contact me   Nephrology [facility] [address]  Office : [phone]  Fax :[phone]

## 2022-05-07 NOTE — PROGRESS NOTES
GI Progress Note      NITA Etienne is a 59 y.o. female patient. 1. Altered mental status, unspecified altered mental status type    2. MALENA (acute kidney injury) ([facility] [address]    3. Urinary tract infection without hematuria, site unspecified        Admit Date: 5/5/2022    Subjective:       Back pain. No melena or hematochezia. No n/v.  No abdominal pain      ROS:  As per above    Scheduled Meds:   hepatitis B vaccine (ADULT)  1 mL IntraMUSCular Once    hepatitis A vaccine  1 each IntraMUSCular Once    rifAXIMin  550 mg Oral BID    lactulose  10 g Oral BID    piperacillin-tazobactam  3,375 mg IntraVENous Q8H    miconazole   Topical BID    lidocaine  1 patch TransDERmal Daily    metoprolol succinate  25 mg Oral Daily    CENTRUM/CERTA-MARJAN with minerals oral  15 mL Oral Daily    vitamin D  5,000 Units Oral Daily    sodium chloride flush  5-40 mL IntraVENous 2 times per day    enoxaparin  40 mg SubCUTAneous Daily    pantoprazole  40 mg IntraVENous BID    insulin lispro  0-12 Units SubCUTAneous TID WC    insulin lispro  0-6 Units SubCUTAneous Nightly    midodrine  5 mg Oral TID WC    octreotide  150 mcg SubCUTAneous Q8H       Continuous Infusions:   sodium chloride      dextrose         PRN Meds:  HYDROcodone 5 mg - acetaminophen, ipratropium-albuterol, sodium chloride flush, sodium chloride, ondansetron **OR** ondansetron, polyethylene glycol, glucagon (rDNA), dextrose, dextrose bolus, glucose      Objective:       Patient Vitals for the past 24 hrs:   BP Temp Temp src Pulse Resp SpO2   05/07/22 0809 136/77 97.5 °F (36.4 °C) Oral 68 16 94 %   05/07/22 0609 129/77 98.2 °F (36.8 °C) Oral 74 19 98 %   05/06/22 2142 -- -- -- -- 17 --   05/06/22 2104 119/77 97.1 °F (36.2 °C) Oral 58 18 97 %   05/06/22 1457 (!) 142/77 97.4 °F (36.3 °C) Oral 56 18 94 %       Exam:  VITALS:  /77   Pulse 68   Temp 97.5 °F (36.4 °C) (Oral)   Resp 16   Ht 5' 6\" (1.676 m)   Wt 199 lb 8 oz (90.5 kg)   LMP 03/31/1982   SpO2 94%   BMI 32.20 kg/m²   TEMPERATURE:  Current - Temp: 97.5 °F (36.4 °C); Max - Temp  Av.6 °F (36.4 °C)  Min: 97.1 °F (36.2 °C)  Max: 98.2 °F (36.8 °C)      General appearance: alert, appears stated age, cooperative and no distress  Head: Normocephalic, without obvious abnormality, atraumatic  Neck: supple, symmetrical, trachea midline and thyroid not enlarged, symmetric, no tenderness/mass/nodules  CVS:  RRR, Nl s1s2  Lungs CTA Bilaterally, normal effort  Abdomen: soft, non-tender; bowel sounds normal; no masses,  no organomegaly  AAOx3, No asterixis or encephalopathy  Extremities: No edema. Recent Labs     22  1143 22  0756 22  0919   WBC 11.0 3.6* 5.5   HGB 13.2 11.3* 12.4   HCT 39.8 34.0* 37.2   MCV 86.7 85.8 86.3    139 150     Recent Labs     22  1143 22  1227 22  0756   * 129* 137   K 9.0* 4.4 4.0   CL 89* 91* 98*   CO2 16* 18* 25   BUN 61* 61* 54*   CREATININE 2.1* 2.1* 1.5*     Recent Labs     22  1143 22  0756   AST 70* 21   ALT 12 10   BILIDIR 0.3  --    BILITOT 1.5* 1.6*   ALKPHOS 157* 139*     No results for input(s): LIPASE, AMYLASE in the last 72 hours. Recent Labs     22  1143 22  1354 22  0756   PROT 9.7*  --  8.5*   INR  --  2.01*  --      No results for input(s): PTT in the last 72 hours. No results for input(s): OCCULTBLD in the last 72 hours. Assessment:       Cirrhosis  HE  Drug abuse  Portal htn  Ulcer  jesenia    Recommendations:       BID PPI  Lactulose/xifaxan but decrease lactulose further  Don't use drugs  Creat improved with hydration. Less likely HRS; does not need midodrine and octreotide. Lack of ascites lends against HRS as well  2 gram Na+ diet  AFP/u/s need repeated in October  Give twinrix today; hep B #2 in 1 month and twinrix again in October  Call back with questions/concerns.  Follow up with Dr. Lake Burns MD  2022  10:35 AM

## 2022-05-08 ENCOUNTER — APPOINTMENT (OUTPATIENT)
Dept: MRI IMAGING | Age: 57
DRG: 441 | End: 2022-05-08
Payer: MEDICARE

## 2022-05-08 LAB
A/G RATIO: 0.9 (ref 1.1–2.2)
ALBUMIN SERPL-MCNC: 3.9 G/DL (ref 3.4–5)
ALP BLD-CCNC: 124 U/L (ref 40–129)
ALT SERPL-CCNC: 11 U/L (ref 10–40)
ANION GAP SERPL CALCULATED.3IONS-SCNC: 10 MMOL/L (ref 3–16)
AST SERPL-CCNC: 17 U/L (ref 15–37)
BASOPHILS ABSOLUTE: 0 K/UL (ref 0–0.2)
BASOPHILS RELATIVE PERCENT: 0.3 %
BILIRUB SERPL-MCNC: 1.4 MG/DL (ref 0–1)
BLOOD CULTURE, ROUTINE: ABNORMAL
BLOOD CULTURE, ROUTINE: ABNORMAL
BUN BLDV-MCNC: 22 MG/DL (ref 7–20)
CALCIUM SERPL-MCNC: 10 MG/DL (ref 8.3–10.6)
CHLORIDE BLD-SCNC: 100 MMOL/L (ref 99–110)
CO2: 27 MMOL/L (ref 21–32)
CREAT SERPL-MCNC: 1.2 MG/DL (ref 0.6–1.1)
EOSINOPHILS ABSOLUTE: 0.2 K/UL (ref 0–0.6)
EOSINOPHILS RELATIVE PERCENT: 4.7 %
GFR AFRICAN AMERICAN: 56
GFR NON-AFRICAN AMERICAN: 46
GLUCOSE BLD-MCNC: 128 MG/DL (ref 70–99)
GLUCOSE BLD-MCNC: 130 MG/DL (ref 70–99)
GLUCOSE BLD-MCNC: 132 MG/DL (ref 70–99)
GLUCOSE BLD-MCNC: 145 MG/DL (ref 70–99)
GLUCOSE BLD-MCNC: 158 MG/DL (ref 70–99)
HCT VFR BLD CALC: 34.9 % (ref 36–48)
HEMOGLOBIN: 11.5 G/DL (ref 12–16)
LYMPHOCYTES ABSOLUTE: 0.9 K/UL (ref 1–5.1)
LYMPHOCYTES RELATIVE PERCENT: 21.8 %
MAGNESIUM: 1.5 MG/DL (ref 1.8–2.4)
MCH RBC QN AUTO: 28.8 PG (ref 26–34)
MCHC RBC AUTO-ENTMCNC: 32.8 G/DL (ref 31–36)
MCV RBC AUTO: 87.9 FL (ref 80–100)
MONOCYTES ABSOLUTE: 0.4 K/UL (ref 0–1.3)
MONOCYTES RELATIVE PERCENT: 9.8 %
NEUTROPHILS ABSOLUTE: 2.6 K/UL (ref 1.7–7.7)
NEUTROPHILS RELATIVE PERCENT: 63.4 %
ORGANISM: ABNORMAL
PDW BLD-RTO: 14.7 % (ref 12.4–15.4)
PERFORMED ON: ABNORMAL
PLATELET # BLD: 132 K/UL (ref 135–450)
PMV BLD AUTO: 7.6 FL (ref 5–10.5)
POTASSIUM REFLEX MAGNESIUM: 3.4 MMOL/L (ref 3.5–5.1)
RBC # BLD: 3.98 M/UL (ref 4–5.2)
SODIUM BLD-SCNC: 137 MMOL/L (ref 136–145)
TOTAL PROTEIN: 8.2 G/DL (ref 6.4–8.2)
URINE CULTURE, ROUTINE: ABNORMAL
URINE CULTURE, ROUTINE: ABNORMAL
WBC # BLD: 4.1 K/UL (ref 4–11)

## 2022-05-08 PROCEDURE — 6370000000 HC RX 637 (ALT 250 FOR IP): Performed by: INTERNAL MEDICINE

## 2022-05-08 PROCEDURE — 6360000002 HC RX W HCPCS: Performed by: INTERNAL MEDICINE

## 2022-05-08 PROCEDURE — 6360000004 HC RX CONTRAST MEDICATION: Performed by: INTERNAL MEDICINE

## 2022-05-08 PROCEDURE — 2580000003 HC RX 258: Performed by: INTERNAL MEDICINE

## 2022-05-08 PROCEDURE — 85025 COMPLETE CBC W/AUTO DIFF WBC: CPT

## 2022-05-08 PROCEDURE — 1200000000 HC SEMI PRIVATE

## 2022-05-08 PROCEDURE — 72157 MRI CHEST SPINE W/O & W/DYE: CPT

## 2022-05-08 PROCEDURE — 99233 SBSQ HOSP IP/OBS HIGH 50: CPT | Performed by: INTERNAL MEDICINE

## 2022-05-08 PROCEDURE — C9113 INJ PANTOPRAZOLE SODIUM, VIA: HCPCS | Performed by: INTERNAL MEDICINE

## 2022-05-08 PROCEDURE — 2700000000 HC OXYGEN THERAPY PER DAY

## 2022-05-08 PROCEDURE — P9047 ALBUMIN (HUMAN), 25%, 50ML: HCPCS | Performed by: INTERNAL MEDICINE

## 2022-05-08 PROCEDURE — 83735 ASSAY OF MAGNESIUM: CPT

## 2022-05-08 PROCEDURE — 94761 N-INVAS EAR/PLS OXIMETRY MLT: CPT

## 2022-05-08 PROCEDURE — 36415 COLL VENOUS BLD VENIPUNCTURE: CPT

## 2022-05-08 PROCEDURE — 80053 COMPREHEN METABOLIC PANEL: CPT

## 2022-05-08 PROCEDURE — 72158 MRI LUMBAR SPINE W/O & W/DYE: CPT

## 2022-05-08 PROCEDURE — A9579 GAD-BASE MR CONTRAST NOS,1ML: HCPCS | Performed by: INTERNAL MEDICINE

## 2022-05-08 RX ORDER — CALCIUM CARBONATE 200(500)MG
500 TABLET,CHEWABLE ORAL 3 TIMES DAILY PRN
Status: DISCONTINUED | OUTPATIENT
Start: 2022-05-08 | End: 2022-05-17 | Stop reason: HOSPADM

## 2022-05-08 RX ORDER — OXYCODONE HYDROCHLORIDE 5 MG/1
10 TABLET ORAL EVERY 4 HOURS PRN
Status: DISCONTINUED | OUTPATIENT
Start: 2022-05-08 | End: 2022-05-17 | Stop reason: HOSPADM

## 2022-05-08 RX ORDER — MAGNESIUM SULFATE IN WATER 40 MG/ML
2000 INJECTION, SOLUTION INTRAVENOUS ONCE
Status: COMPLETED | OUTPATIENT
Start: 2022-05-08 | End: 2022-05-08

## 2022-05-08 RX ORDER — LORAZEPAM 0.5 MG/1
0.5 TABLET ORAL ONCE
Status: COMPLETED | OUTPATIENT
Start: 2022-05-08 | End: 2022-05-08

## 2022-05-08 RX ADMIN — MIDODRINE HYDROCHLORIDE 5 MG: 5 TABLET ORAL at 16:42

## 2022-05-08 RX ADMIN — MICONAZOLE NITRATE: 20 CREAM TOPICAL at 10:43

## 2022-05-08 RX ADMIN — MICONAZOLE NITRATE: 20 CREAM TOPICAL at 20:57

## 2022-05-08 RX ADMIN — RIFAXIMIN 550 MG: 550 TABLET ORAL at 10:42

## 2022-05-08 RX ADMIN — Medication 15 ML: at 10:43

## 2022-05-08 RX ADMIN — OXYCODONE 5 MG: 5 TABLET ORAL at 06:43

## 2022-05-08 RX ADMIN — PIPERACILLIN AND TAZOBACTAM 3375 MG: 3; .375 INJECTION, POWDER, LYOPHILIZED, FOR SOLUTION INTRAVENOUS at 14:47

## 2022-05-08 RX ADMIN — PANTOPRAZOLE SODIUM 40 MG: 40 INJECTION, POWDER, FOR SOLUTION INTRAVENOUS at 20:51

## 2022-05-08 RX ADMIN — OCTREOTIDE ACETATE 150 MCG: 100 INJECTION, SOLUTION INTRAVENOUS; SUBCUTANEOUS at 01:01

## 2022-05-08 RX ADMIN — ENOXAPARIN SODIUM 40 MG: 100 INJECTION SUBCUTANEOUS at 10:40

## 2022-05-08 RX ADMIN — OXYCODONE 10 MG: 5 TABLET ORAL at 23:30

## 2022-05-08 RX ADMIN — MIDODRINE HYDROCHLORIDE 5 MG: 5 TABLET ORAL at 12:36

## 2022-05-08 RX ADMIN — Medication 5000 UNITS: at 10:42

## 2022-05-08 RX ADMIN — PANTOPRAZOLE SODIUM 40 MG: 40 INJECTION, POWDER, FOR SOLUTION INTRAVENOUS at 10:42

## 2022-05-08 RX ADMIN — PIPERACILLIN AND TAZOBACTAM 3375 MG: 3; .375 INJECTION, POWDER, LYOPHILIZED, FOR SOLUTION INTRAVENOUS at 23:29

## 2022-05-08 RX ADMIN — ALBUMIN (HUMAN) 12.5 G: 0.25 INJECTION, SOLUTION INTRAVENOUS at 02:02

## 2022-05-08 RX ADMIN — ONDANSETRON 4 MG: 2 INJECTION INTRAMUSCULAR; INTRAVENOUS at 10:56

## 2022-05-08 RX ADMIN — RIFAXIMIN 550 MG: 550 TABLET ORAL at 20:51

## 2022-05-08 RX ADMIN — LORAZEPAM 0.5 MG: 0.5 TABLET ORAL at 21:48

## 2022-05-08 RX ADMIN — OCTREOTIDE ACETATE 150 MCG: 100 INJECTION, SOLUTION INTRAVENOUS; SUBCUTANEOUS at 16:42

## 2022-05-08 RX ADMIN — LACTULOSE 10 G: 20 SOLUTION ORAL at 10:42

## 2022-05-08 RX ADMIN — SODIUM CHLORIDE, PRESERVATIVE FREE 10 ML: 5 INJECTION INTRAVENOUS at 10:46

## 2022-05-08 RX ADMIN — SODIUM CHLORIDE, PRESERVATIVE FREE 10 ML: 5 INJECTION INTRAVENOUS at 20:57

## 2022-05-08 RX ADMIN — OXYCODONE 10 MG: 5 TABLET ORAL at 10:43

## 2022-05-08 RX ADMIN — OCTREOTIDE ACETATE 150 MCG: 100 INJECTION, SOLUTION INTRAVENOUS; SUBCUTANEOUS at 08:22

## 2022-05-08 RX ADMIN — PIPERACILLIN AND TAZOBACTAM 3375 MG: 3; .375 INJECTION, POWDER, LYOPHILIZED, FOR SOLUTION INTRAVENOUS at 06:28

## 2022-05-08 RX ADMIN — MIDODRINE HYDROCHLORIDE 5 MG: 5 TABLET ORAL at 08:22

## 2022-05-08 RX ADMIN — ALBUMIN (HUMAN) 12.5 G: 0.25 INJECTION, SOLUTION INTRAVENOUS at 08:12

## 2022-05-08 RX ADMIN — SODIUM CHLORIDE: 9 INJECTION, SOLUTION INTRAVENOUS at 23:28

## 2022-05-08 RX ADMIN — GADOTERIDOL 19 ML: 279.3 INJECTION, SOLUTION INTRAVENOUS at 22:38

## 2022-05-08 RX ADMIN — MAGNESIUM SULFATE HEPTAHYDRATE 2000 MG: 2 INJECTION, SOLUTION INTRAVENOUS at 12:44

## 2022-05-08 RX ADMIN — ALBUMIN (HUMAN) 12.5 G: 0.25 INJECTION, SOLUTION INTRAVENOUS at 12:50

## 2022-05-08 RX ADMIN — ANTACID TABLETS 500 MG: 500 TABLET, CHEWABLE ORAL at 10:42

## 2022-05-08 RX ADMIN — SODIUM CHLORIDE: 9 INJECTION, SOLUTION INTRAVENOUS at 08:08

## 2022-05-08 ASSESSMENT — PAIN SCALES - GENERAL
PAINLEVEL_OUTOF10: 10
PAINLEVEL_OUTOF10: 7
PAINLEVEL_OUTOF10: 7

## 2022-05-08 ASSESSMENT — PAIN DESCRIPTION - DESCRIPTORS
DESCRIPTORS: ACHING
DESCRIPTORS: THROBBING
DESCRIPTORS: SHARP;STABBING

## 2022-05-08 ASSESSMENT — PAIN DESCRIPTION - ORIENTATION: ORIENTATION: LOWER

## 2022-05-08 ASSESSMENT — PAIN DESCRIPTION - FREQUENCY: FREQUENCY: CONTINUOUS

## 2022-05-08 ASSESSMENT — PAIN DESCRIPTION - LOCATION
LOCATION: BACK
LOCATION: BACK

## 2022-05-08 ASSESSMENT — PAIN - FUNCTIONAL ASSESSMENT: PAIN_FUNCTIONAL_ASSESSMENT: ACTIVITIES ARE NOT PREVENTED

## 2022-05-08 NOTE — PROGRESS NOTES
Hospitalist Progress Note      PCP: No primary care provider on file. Date of Admission: 5/5/2022    Chief Complaint: [address] Course: 59 y.o. female with a past medical history of alcoholic cirrhosis, COVID-pneumonia complicated by respiratory failure status post tracheostomy (no vent requirement), diabetes, who presents from her living facility with altered mental status and possible ingestion.  Patient currently resides in a nursing home [facility]) and was noted to be confused this morning upon awakening.  Squad also states that patient's nursing home reported a visitor around 3 AM, with concern for administering Tylenol PM.  Patient admits to taking 2 these, but denies any other ingestions today.  Patient reporting pain in her back as well as in her abdomen, she has no shortness of breath or chest pain.  No fevers that she is reporting.     Nursing facility reports she has some visitor that frequently visits her and everytime after the visitor comes she would become very lethargic and drowsy. It was reported that she was visited by the visitors again and was given narcan afterwards. But prior UDS neg for opioids.      Subjective: BACK PAIN SEEM TO BE PRETTY BAD      Medications:  Reviewed    Infusion Medications    sodium chloride 5 mL/hr at 05/08/22 0808    dextrose       Scheduled Medications    lactulose  10 g Oral Daily    albumin human  12.5 g IntraVENous Q6H    rifAXIMin  550 mg Oral BID    piperacillin-tazobactam  3,375 mg IntraVENous Q8H    miconazole   Topical BID    lidocaine  1 patch TransDERmal Daily    metoprolol succinate  25 mg Oral Daily    CENTRUM/CERTA-MARJAN with minerals oral  15 mL Oral Daily    vitamin D  5,000 Units Oral Daily    sodium chloride flush  5-40 mL IntraVENous 2 times per day    enoxaparin  40 mg SubCUTAneous Daily    pantoprazole  40 mg IntraVENous BID    insulin lispro  0-12 Units SubCUTAneous TID     insulin lispro  0-6 Units SubCUTAneous Nightly    midodrine  5 mg Oral TID     octreotide  150 mcg SubCUTAneous Q8H     PRN Meds: calcium carbonate, oxyCODONE, ipratropium-albuterol, sodium chloride flush, sodium chloride, ondansetron **OR** ondansetron, polyethylene glycol, glucagon (rDNA), dextrose, dextrose bolus, glucose      Intake/Output Summary (Last 24 hours) at 5/8/2022 1122  Last data filed at 5/8/2022 1109  Gross per 24 hour   Intake 360 ml   Output 1050 ml   Net -690 ml       Physical Exam Performed:    /74   Pulse 53   Temp 98.5 °F (36.9 °C) (Oral)   Resp 18   Ht 5' 6\" (1.676 m)   Wt 199 lb 8 oz (90.5 kg)   LMP 03/31/1982   SpO2 95%   BMI 32.20 kg/m²     General appearance: chronically ill appearing, appears stated age and cooperative. HEENT: Pupils equal, round, and reactive to light. Conjunctivae/corneas clear. Neck: Supple, with full range of motion. No jugular venous distention. Trachea midline. Respiratory:  Normal respiratory effort. Clear to auscultation, bilaterally without Rales/Wheezes/Rhonchi. Cardiovascular: Regular rate and rhythm with normal S1/S2 without murmurs, rubs or gallops. Abdomen: Soft, non-tender, non-distended with normal bowel sounds. Musculoskeletal: No clubbing, cyanosis or edema bilaterally. Full range of motion without deformity. Skin: Skin color, texture, turgor normal.  No rashes or lesions. Neurologic:  Neurovascularly intact without any focal sensory/motor deficits.  Cranial nerves: II-XII intact, grossly non-focal.  Psychiatric: less confused today  Capillary Refill: Brisk,3 seconds, normal   Peripheral Pulses: +2 palpable, equal bilaterally       Labs:   Recent Labs     05/06/22  0756 05/07/22  0919 05/08/22  0818   WBC 3.6* 5.5 4.1   HGB 11.3* 12.4 11.5*   HCT 34.0* 37.2 34.9*    150 132*     Recent Labs     05/06/22  0756 05/07/22  0919 05/08/22  0818    138 137   K 4.0 3.4* 3.4*   CL 98* 99 100   CO2 25 24 27   BUN 54* 35* 22*   CREATININE 1.5* 1.4* 1.2*   CALCIUM 9.6 10.2 10.0     Recent Labs     05/05/22  1143 05/05/22  1143 05/06/22  0756 05/07/22  0919 05/08/22  0818   AST 70*   < > 21 22 17   ALT 12   < > 10 12 11   BILIDIR 0.3  --   --   --   --    BILITOT 1.5*   < > 1.6* 1.4* 1.4*   ALKPHOS 157*   < > 139* 140* 124    < > = values in this interval not displayed. Recent Labs     05/05/22  1354   INR 2.01*     No results for input(s): Danni Brian in the last 72 hours. Urinalysis:      Lab Results   Component Value Date    NITRU POSITIVE 05/05/2022    WBCUA 3-5 05/05/2022    BACTERIA 4+ 05/05/2022    RBCUA None seen 05/05/2022    BLOODU Negative 05/05/2022    SPECGRAV 1.010 05/05/2022    GLUCOSEU Negative 05/05/2022       Radiology:  US ABDOMEN LIMITED   Final Result      No ascites identified               CT HEAD WO CONTRAST   Final Result      No acute intracranial pathology                 XR CHEST PORTABLE   Final Result      No acute cardiopulmonary disease. MRI LUMBAR SPINE W WO CONTRAST    (Results Pending)   MRI THORACIC SPINE W WO CONTRAST    (Results Pending)           Assessment/Plan:    Active Hospital Problems    Diagnosis     Acute metabolic encephalopathy [W08.79]      Priority: Medium    Hyperammonemia ([facility] [address] [E72.20]      Priority: Medium    Hyponatremia [E87.1]      Priority: Medium    Azotemia [R79.89]      Priority: Medium    MALENA (acute kidney injury) ([facility] [address] [N17.9]      Priority: Medium    Altered mental status [R41.82]      Priority: Medium     Acute metabolic encephalopathy likely 2/2 hepatic encephalopathy  Drug induced encephalopathy is another possibility  Alcoholic cirrhosis  - nursing home report there is a visitor that frequently sees her and every time that visitor left she becomes more confused and lethargic however UDS neg for opioids.  But she was given narcan prior to transfer to hospital  - HCV abx negative (1/13/22), HBV negative (not vaccinated)  - ammonia is elevated to 93  - IVF  - lactulose and rifaximin  - consulted neurology  - consulted GI  - stopped gabapentin and seroquel    BACK PAIN  - SEEM TO BE SEVERE  - WILL DO MRI TO RULE OUT OM OR DISCITIS  - OXYCODONE PRN    MSSA bacteremia  UTI (klebsiella and e. Coli 5/5/22)  - Rocephin dose increased to 2 G  - consulted ID  - ECHO neg  - IV zosyn  - repeat blood culture pending  - ID TO FINALIZE ABX     MALENA improving  Hyponatremia  - likely pre-renal  - holding spironolactone and torsemide  - IVF/albumin  - consulted nephrology    PUD  - March 2022 EGD showed 3 clean based antral ulcers and duodenal ulcers  - IV PPI  - will need follow up with Dr. Marina Amado     UTI  - was treated for UTI during last admission, previous urine culture showed klebsialla and proteus both sensitive to rocephin  - will start IV rocephin for now  - UA positive again, culture showed klebsiella and e.coli  - rocephin dose increased due to bacteremia  - switched to IV zosyn per ID     DMII with neuropathy  - sliding scale  - gabapentin reduced dose     Chronic hypoxic resp 2/2 COVID PNA s/p trach  Chronic COPD not in exacerbation  - Hx of Staph aureus, Serratia, Streno per bronchs in 11/2021 - 12/2021  - has chronic trach  - COPD not in exacerbation  - duoneb PRN     Chronic Diastolic CHF  - hold diuretics  - TTE 1/2022 showed normal EF     Obesity  - recommend weight loss     Bipolar  - seroquel reduced dose               DVT Prophylaxis: heparin  Diet: ADULT DIET;  Regular  Code Status: Full Code    PT/OT Eval Status: pending    Dispo - wards    Bharat Ryan DO

## 2022-05-08 NOTE — PROGRESS NOTES
Infectious Disease Follow up Notes  Admit Date: 5/5/2022  Hospital Day: 4    Antibiotics :  IV Zosyn     CHIEF COMPLAINT:     Sepsis  Hepatic encephalopathy  Staph aureus bacteremia]  UTI    Subjective interval History :  59 y.o. woman with history of alcohol cirrhosis, history of COVID-19 pneumonia in the past requiring tracheostomy currently not requiring ventilatory support previous Trach in place resides  in the nursing home admitted to the hospital secondary to worsening confusion and fevers she was also complaining of some lower abdominal pain. Admission labs indicate creatinine elevation to 2.1 sodium at 129 lactic acid 3.7, ammonia elevated at 93, LFT elevation secondary to cirrhosis of liver, INR of 2 blood culture positive for Staph aureus MSSA from admit,   urine culture positive for E. coli and Klebsiella.   Given the ongoing sepsis we are consulted for recommendation patient is admitted to prevent any history secondary to change in mental status and encephalopathy       Interval History :  More awake, no abd pain  Tolerating IV abx ok and Trach in place and Blood cx in follow up pending , Creat trend down        Past Medical History:    Past Medical History:   Diagnosis Date    [facility]F (congestive heart failure) ([facility] [address]     Hyperlipidemia     Obesity     Trache[facility] in place [facility])        Past Surgical History:    Past Surgical History:   Procedure Laterality Date    UPPER GASTROINTESTINAL ENDOSCOPY N/A 3/31/2022    EGD BIOPSY performed by Lesa Whiteside MD at [facility] ENDOSCOPY       Current Medications:    Outpatient Medications Marked as Taking for the 5/5/22 encounter [facility] Encounter)   Medication Sig Dispense Refill    metFORMIN (GLUCOPHAGE) 500 MG tablet Take 1,000 mg by mouth 2 times daily (with meals)      metoprolol tartrate (LOPRESSOR) 25 MG tablet Take 25 mg by mouth 2 times daily      traZODone (DESYREL) 100 MG tablet Take 100 mg by mouth nightly      tiZANidine (ZANAFLEX) 4 MG tablet Take 2 mg by mouth daily      tiZANidine (ZANAFLEX) 4 MG tablet Take 4 mg by mouth at bedtime         Allergies:  Patient has no known allergies.     Immunizations :   Immunization History   Administered Date(s) Administered    RIGOBERTOID-19, Perfecto Desir, Primary or Immunocompromised, PF, 100mcg/0.5mL 01/13/2022    Hepatitis A Adult (Havrix, Vaqta) 05/07/2022    Hepatitis B Adult (Engerix-B) 05/07/2022       Social History:     Social History     Tobacco Use    Smoking status: Former Smoker    Smokeless tobacco: Never Used   Substance Use Topics    Alcohol use: Not Currently    Drug use: Never     Social History     Tobacco Use   Smoking Status Former Smoker   Smokeless Tobacco Never Used      Family History : no DVT no copd       REVIEW OF SYSTEMS:      Not possible due to change in mentation                PHYSICAL EXAM:      Vitals:    /74   Pulse 53   Temp 98.5 °F (36.9 °C)   Resp 18   Ht 5' 6\" (1.676 m)   Wt 199 lb 8 oz (90.5 kg)   LMP 03/31/1982   SpO2 98%   BMI 32.20 kg/m²     General Appearance: more awake and in some  acute distress, ++  pallor, no icterus Trach++   Skin: warm and dry, no rash or erythema  Head: normocephalic and atraumatic  Eyes: pupils equal, round, and reactive to light, conjunctivae normal  ENT: tympanic membrane, external ear and ear canal normal bilaterally, nose without deformity, nasal mucosa and turbinates normal without polyps  Neck: supple and non-tender without mass, no thyromegaly  no cervical lymphadenopathy  Pulmonary/Chest: Bi basal crepts+ wheezes, rales or rhonchi, normal air movement, in some   respiratory distress  Cardiovascular:   S1 and S2, no murmurs, rubs, clicks, or gallops, no carotid bruits  Abdomen: soft, + -tender, non-distended, normal bowel sounds, no masses or organomegaly  Extremities: no cyanosis, clubbing or edema  Musculoskeletal: normal range of motion, no joint swelling, deformity or tenderness  Integumentary: No rashes, no abnormal skin lesions, no petechiae  Neurologic: reflexes normal and symmetric, no cranial nerve deficit  Lines: IV    Data Review:    CBC:   Lab Results   Component Value Date    WBC 4.1 05/08/2022    HGB 11.5 (L) 05/08/2022    HCT 34.9 (L) 05/08/2022    MCV 87.9 05/08/2022     (L) 05/08/2022     RENAL:   Lab Results   Component Value Date    CREATININE 1.2 (H) 05/08/2022    BUN 22 (H) 05/08/2022     05/08/2022    K 3.4 (L) 05/08/2022     05/08/2022    CO2 27 05/08/2022     SED RATE: No results found for: SEDRATE  CK:   Lab Results   Component Value Date    CKTOTAL 35 03/28/2022     CRP: No results found for: CRP  Hepatic Function Panel:   Lab Results   Component Value Date    ALKPHOS 124 05/08/2022    ALT 11 05/08/2022    AST 17 05/08/2022    PROT 8.2 05/08/2022    BILITOT 1.4 05/08/2022    BILIDIR 0.3 05/05/2022    IBILI 1.2 05/05/2022    LABALBU 3.9 05/08/2022     UA:  Lab Results   Component Value Date    COLORU Yellow 05/05/2022    CLARITYU Clear 05/05/2022    GLUCOSEU Negative 05/05/2022    BILIRUBINUR Negative 05/05/2022    KETUA Negative 05/05/2022    SPECGRAV 1.010 05/05/2022    BLOODU Negative 05/05/2022    PHUR 5.0 05/05/2022    PROTEINU Negative 05/05/2022    UROBILINOGEN 0.2 05/05/2022    NITRU POSITIVE 05/05/2022    LEUKOCYTESUR SMALL 05/05/2022    LABMICR YES 05/05/2022    URINETYPE NotGiven 05/05/2022      Urine Microscopic:   Lab Results   Component Value Date    BACTERIA 4+ 05/05/2022    HYALCAST 0-2 05/05/2022    WBCUA 3-5 05/05/2022    RBCUA None seen 05/05/2022    EPIU 6-10 05/05/2022     Urine Reflex to Culture:   Lab Results   Component Value Date    URRFLXCULT Not Indicated 03/28/2022         MICRO: cultures reviewed and updated by me   Blood Culture:   Lab Results   Component Value Date    [facility]  05/07/2022     No Growth to date. Any change in status will be called.     BLOODCULT2  05/07/2022     No Growth to date.  Any change in status will be called. Respiratory Culture:  No results found for: [address]  AFB:No results found for: AFBSMEAR  Viral Culture:  Lab Results   Component Value Date    COVID19 Not Detected 03/29/2022     Urine Culture:   Recent Labs     05/05/22  1331   LABURIN >100,000 CFU/ml  >100,000 CFU/ml     Conclusions      Summary   Normal left ventricle size, wall thickness, and systolic function with an   estimated ejection fraction of 60-65%. No regional wall motion abnormalities   are seen. Diastolic filling parameters suggests normal diastolic function. No clear evidence of endocarditis      Signature      ------------------------------------------------------------------   Electronically signed by Eddie Vargas MD (Interpreting   physician) on 05/06/2022 at 03:06 PM        Procedure Component Value Units Date/Time   Culture, Blood 2 [[id number]] Collected: 05/07/22 0919   Order Status: Completed Specimen: Blood Updated: 05/08/22 1015    Culture, Blood 2 No Growth to date.  Any change in status will be called. Narrative:     ORDER#: [id number]                          ORDERED BY: Aron Pradhan   SOURCE: Blood                              COLLECTED:  05/07/22 09:19   [facility] [place] :                      RECEIVED :  05/07/22 15:28   If child <=2 yrs old please draw pediatric bottle. ~Blood Culture #2   Culture, Blood 1 [[id number]] Collected: 05/07/22 0919   Order Status: Completed Specimen: Blood Updated: 05/08/22 1015    Blood Culture, Routine No Growth to date.  Any change in status will be called. Narrative:     ORDER#: [id number]                          ORDERED BY: Aron Pradhan   SOURCE: Blood                              COLLECTED:  05/07/22 09:19   [facility] [place] :                      RECEIVED :  05/07/22 15:28   If child <=2 yrs old please draw pediatric bottle. ~Blood Culture 1   Culture, Blood 1 [[id number]] (Abnormal)  Collected: 05/05/22 8954 Order Status: Completed Specimen: Blood Updated: 05/08/22 0625    Organism Staph aureus DNA Detected Abnormal     Blood Culture, Routine --    mecA gene not detected   See additional report for complete BCID panel. Organism Staphylococcus aureus Abnormal     Blood Culture, Routine --    POSITIVE for   Isolated one of two sets    Narrative:     ORDER#: [id number]                          ORDERED BY: Black Rodriguez   SOURCE: Blood left ac                      COLLECTED:  05/05/22 13:54   [facility] [place] :                      RECEIVED :  05/05/22 22:35   CALL  [facility]  [id number] tel. [phone],   Microbiology results called to and read back by Pooja Lau,   05/06/2022 11:40, by [address]   Microbiology results called to and read back by ROBIN Holcomb, 05/06/2022   11:38, by [address] [place], [place] [address]   If child <=2 yrs old please draw pediatric bottle. ~Blood Culture 1   Culture, Urine [[id number]] (Abnormal)  Collected: 05/05/22 1331   Order Status: Completed Specimen: Urine, clean catch Updated: 05/08/22 0621    Organism Klebsiella pneumoniae Abnormal     Urine Culture, Routine >100,000 CFU/ml    Organism Escherichia coli Abnormal     Urine Culture, Routine >100,000 CFU/ml   Narrative:     ORDER#: [id number]                          ORDERED BY: Black Rodriguez   SOURCE: Urine Clean Catch                  COLLECTED:  05/05/22 13:31   [facility] [place] :                      RECEIVED :  05/05/22 15:47   Culture, Blood 2 [[id number]] Collected: 05/05/22 2042   Order Status: Completed Specimen: Blood Updated: 05/06/22 2215    Culture, Blood 2 No Growth to date.  Any change in status will be called. Narrative:     ORDER#: [id number]                          ORDERED BY: Blcak Rodriguez   SOURCE: Blood                              COLLECTED:  05/05/22 20:42   [facility] [place] :                      RECEIVED :  05/06/22 02:28   If child <=2 yrs old please draw pediatric bottle. ~Blood Culture #2   Culture, Blood, PCR ID Panel Results Report [[id number]] Collected: 05/05/22 1354   Order Status: Completed Updated: 05/06/22 1143    Report SEE IMAGE   Narrative:     CALL  [facility]  [id number] tel. [phone],   Microbiology results called to and read back by Alfredo Rhodes,   05/06/2022 11:40, by [facility]   Microbiology results called to and read back by ROBIN Holcomb, 05/06/2022   11:38, by GUY   Culture, Urine [[id number]]    Order Status: Canceled Specimen: Urine, clean catch       Result Notes    Component 5/5/22 1331   Organism Klebsiella pneumoniae Abnormal     Urine Culture, Routine >100,000 CFU/ml    Organism Escherichia coli Abnormal     Urine Culture, Routine >100,000 CFU/ml    Resulting Agency [address] [facility]          Susceptibility      Klebsiella pneumoniae (2)    Antibiotic Interpretation Microscan  Method Status    amoxicillin-clavulanate Sensitive <=8/4 mcg/mL BACTERIAL SUSCEPTIBILITY PANEL BY TRIPP     ampicillin Resistant >16 mcg/mL BACTERIAL SUSCEPTIBILITY PANEL BY TRIPP     ampicillin-sulbactam Sensitive <=8/4 mcg/mL BACTERIAL SUSCEPTIBILITY PANEL BY TRIPP     ceFAZolin Sensitive <=2 mcg/mL BACTERIAL SUSCEPTIBILITY PANEL BY TRIPP      NOTE: Cefazolin should only be used for uncomplicated UTI         for E.coli or Klebsiella pneumoniae.        cefepime Sensitive <=2 mcg/mL BACTERIAL SUSCEPTIBILITY PANEL BY TRIPP     cefTRIAXone Sensitive <=1 mcg/mL BACTERIAL SUSCEPTIBILITY PANEL BY TRIPP     cefuroxime Intermediate 16 mcg/mL BACTERIAL SUSCEPTIBILITY PANEL BY TRIPP     ciprofloxacin Sensitive <=1 mcg/mL BACTERIAL SUSCEPTIBILITY PANEL BY TRIPP     ertapenem Sensitive <=0.5 mcg/mL BACTERIAL SUSCEPTIBILITY PANEL BY TRIPP     gentamicin Sensitive <=4 mcg/mL BACTERIAL SUSCEPTIBILITY PANEL BY TRIPP     meropenem Sensitive <=1 mcg/mL BACTERIAL SUSCEPTIBILITY PANEL BY TRIPP     nitrofurantoin Resistant >64 mcg/mL BACTERIAL SUSCEPTIBILITY PANEL BY TRIPP     piperacillin-tazobactam Sensitive <=16 mcg/mL BACTERIAL SUSCEPTIBILITY PANEL BY TRIPP trimethoprim-sulfamethoxazole Sensitive <=2/38 mcg/mL BACTERIAL SUSCEPTIBILITY PANEL BY TRIPP       Escherichia coli (1)    Antibiotic Interpretation Microscan  Method Status    ampicillin Sensitive <=8 mcg/mL BACTERIAL SUSCEPTIBILITY PANEL BY TRIPP     ampicillin-sulbactam Sensitive <=8/4 mcg/mL BACTERIAL SUSCEPTIBILITY PANEL BY TRIPP     ceFAZolin Sensitive <=2 mcg/mL BACTERIAL SUSCEPTIBILITY PANEL BY TRIPP      NOTE: Cefazolin should only be used for uncomplicated UTI         for E.coli or Klebsiella pneumoniae. cefepime Sensitive <=2 mcg/mL BACTERIAL SUSCEPTIBILITY PANEL BY TRIPP     cefTRIAXone Sensitive <=1 mcg/mL BACTERIAL SUSCEPTIBILITY PANEL BY TRIPP     cefuroxime Sensitive 8 mcg/mL BACTERIAL SUSCEPTIBILITY PANEL BY TRIPP     ciprofloxacin Sensitive <=1 mcg/mL BACTERIAL SUSCEPTIBILITY PANEL BY TRIPP     ertapenem Sensitive <=0.5 mcg/mL BACTERIAL SUSCEPTIBILITY PANEL BY TRIPP     gentamicin Sensitive <=4 mcg/mL BACTERIAL SUSCEPTIBILITY PANEL BY TRIPP     meropenem Sensitive <=1 mcg/mL BACTERIAL SUSCEPTIBILITY PANEL BY TRIPP     nitrofurantoin Sensitive <=32 mcg/mL BACTERIAL SUSCEPTIBILITY PANEL BY TRIPP     piperacillin-tazobactam Sensitive <=16 mcg/mL BACTERIAL SUSCEPTIBILITY PANEL BY TRIPP     trimethoprim-sulfamethoxazole Sensitive <=2/38 mcg/mL BACTERIAL SUSCEPTIBILITY PANEL BY TRIPP      Condensed View        Narrative  Performed by: [address] [facility]  ORDER#: [id number]                          ORDERED BY: Tom Nuñez   SOURCE: Urine Clean Catch                  COLLECTED:  05/05/22 13:31   [facility] [place] :                      RECEIVED :  05/05/22 15:47               IMAGING:    US ABDOMEN LIMITED   Final Result      No ascites identified               CT HEAD WO CONTRAST   Final Result      No acute intracranial pathology                 XR CHEST PORTABLE   Final Result      No acute cardiopulmonary disease.          MRI LUMBAR SPINE W WO CONTRAST    (Results Pending)   MRI THORACIC SPINE W WO CONTRAST (Results Pending)         All the pertinent images and reports for the current Hospitalization were reviewed by me     Scheduled Meds:   magnesium sulfate  2,000 mg IntraVENous Once    lactulose  10 g Oral Daily    albumin human  12.5 g IntraVENous Q6H    rifAXIMin  550 mg Oral BID    piperacillin-tazobactam  3,375 mg IntraVENous Q8H    miconazole   Topical BID    lidocaine  1 patch TransDERmal Daily    metoprolol succinate  25 mg Oral Daily    CENTRUM/CERTA-MARJAN with minerals oral  15 mL Oral Daily    vitamin D  5,000 Units Oral Daily    sodium chloride flush  5-40 mL IntraVENous 2 times per day    enoxaparin  40 mg SubCUTAneous Daily    pantoprazole  40 mg IntraVENous BID    insulin lispro  0-12 Units SubCUTAneous TID WC    insulin lispro  0-6 Units SubCUTAneous Nightly    midodrine  5 mg Oral TID WC    octreotide  150 mcg SubCUTAneous Q8H       Continuous Infusions:   sodium chloride 5 mL/hr at 05/08/22 0808    dextrose         PRN Meds:  calcium carbonate, oxyCODONE, ipratropium-albuterol, sodium chloride flush, sodium chloride, ondansetron **OR** ondansetron, polyethylene glycol, glucagon (rDNA), dextrose, dextrose bolus, glucose      Assessment:     Patient Active Problem List   Diagnosis    Intractable abdominal pain    Acute metabolic encephalopathy    Hyperammonemia (HCC)    Hyponatremia    Azotemia    MALENA [facility]acute kidney [facility]) ([facility] [address]    Altered mental status     Sepsis  Staph aureus bacteremia  UTI  Hepatic encephalopathy  Ammonia elevation   Cirrhosis of liver  MALENA  Lactic acidosis  Lft elevation from cirrhosis of liver  INR elevation  USG liver no ascites to check for peritonitis   Tracheostomy in place  NH resident        Labs, Microbiology, Radiology and all the pertinent results from current hospitalization and  care every where were reviewed  by me as a part of the evaluation   Plan:   1. Cont IV Zosyn x 3.375 mg x q 8 HRS  2. Will cover staph aureus and UTI   3.  Creat trend down   4. Blood cx from 5/7 NGTD  5. If Renal would allow can arrange MID LINE for IV abx  6. Can choose IV Ceftriaxone once a day or IV Ertapenem once a day for d/c planning for x 14 days ? 7. MRI T and L spine per primary    8. TTE  -VE       Discussed with patient/Family and Nursing staff   Risk of Complications/Morbidity: High      · Illness(es)/ Infection present that pose threat to bodily function. · There is potential for severe exacerbation of infection/side effects of treatment. · Therapy requires intensive monitoring for antimicrobial agent toxicity. Thanks for allowing me to participate in your patient's care and please call me with any questions or concerns.     Stephanie Oh MD  Infectious Disease  [facility] ([facility]) Physician  Phone: [phone]   Fax : [phone]

## 2022-05-08 NOTE — PROGRESS NOTES
Patient is alert this shift, continues with IV antibiotic therapy, still with complaints of pain to back area. Area cleaned under trach collar. Patient resting with eyes closed, bed in lowest position, bed alarm on and functioning.

## 2022-05-09 LAB
CULTURE, BLOOD 2: NORMAL
GLUCOSE BLD-MCNC: 104 MG/DL (ref 70–99)
GLUCOSE BLD-MCNC: 116 MG/DL (ref 70–99)
GLUCOSE BLD-MCNC: 127 MG/DL (ref 70–99)
GLUCOSE BLD-MCNC: 140 MG/DL (ref 70–99)
GLUCOSE BLD-MCNC: 147 MG/DL (ref 70–99)
PERFORMED ON: ABNORMAL

## 2022-05-09 PROCEDURE — 6370000000 HC RX 637 (ALT 250 FOR IP): Performed by: INTERNAL MEDICINE

## 2022-05-09 PROCEDURE — 2580000003 HC RX 258: Performed by: INTERNAL MEDICINE

## 2022-05-09 PROCEDURE — 6360000002 HC RX W HCPCS: Performed by: INTERNAL MEDICINE

## 2022-05-09 PROCEDURE — 1200000000 HC SEMI PRIVATE

## 2022-05-09 PROCEDURE — C9113 INJ PANTOPRAZOLE SODIUM, VIA: HCPCS | Performed by: INTERNAL MEDICINE

## 2022-05-09 PROCEDURE — 99233 SBSQ HOSP IP/OBS HIGH 50: CPT | Performed by: INTERNAL MEDICINE

## 2022-05-09 RX ADMIN — MICONAZOLE NITRATE: 20 CREAM TOPICAL at 20:42

## 2022-05-09 RX ADMIN — PANTOPRAZOLE SODIUM 40 MG: 40 INJECTION, POWDER, FOR SOLUTION INTRAVENOUS at 08:42

## 2022-05-09 RX ADMIN — INSULIN LISPRO 2 UNITS: 100 INJECTION, SOLUTION INTRAVENOUS; SUBCUTANEOUS at 12:59

## 2022-05-09 RX ADMIN — Medication 5000 UNITS: at 08:40

## 2022-05-09 RX ADMIN — PIPERACILLIN AND TAZOBACTAM 3375 MG: 3; .375 INJECTION, POWDER, LYOPHILIZED, FOR SOLUTION INTRAVENOUS at 07:42

## 2022-05-09 RX ADMIN — RIFAXIMIN 550 MG: 550 TABLET ORAL at 20:41

## 2022-05-09 RX ADMIN — MIDODRINE HYDROCHLORIDE 5 MG: 5 TABLET ORAL at 08:39

## 2022-05-09 RX ADMIN — OXYCODONE 10 MG: 5 TABLET ORAL at 06:31

## 2022-05-09 RX ADMIN — SODIUM CHLORIDE 30 ML: 9 INJECTION, SOLUTION INTRAVENOUS at 07:40

## 2022-05-09 RX ADMIN — OCTREOTIDE ACETATE 150 MCG: 100 INJECTION, SOLUTION INTRAVENOUS; SUBCUTANEOUS at 01:00

## 2022-05-09 RX ADMIN — RIFAXIMIN 550 MG: 550 TABLET ORAL at 08:41

## 2022-05-09 RX ADMIN — OXYCODONE 10 MG: 5 TABLET ORAL at 20:41

## 2022-05-09 RX ADMIN — CEFAZOLIN SODIUM 2000 MG: 10 INJECTION, POWDER, FOR SOLUTION INTRAVENOUS at 23:27

## 2022-05-09 RX ADMIN — SODIUM CHLORIDE 30 ML: 9 INJECTION, SOLUTION INTRAVENOUS at 15:01

## 2022-05-09 RX ADMIN — OXYCODONE 10 MG: 5 TABLET ORAL at 10:40

## 2022-05-09 RX ADMIN — SODIUM CHLORIDE, PRESERVATIVE FREE 10 ML: 5 INJECTION INTRAVENOUS at 20:42

## 2022-05-09 RX ADMIN — OXYCODONE 10 MG: 5 TABLET ORAL at 15:16

## 2022-05-09 RX ADMIN — LACTULOSE 10 G: 20 SOLUTION ORAL at 08:41

## 2022-05-09 RX ADMIN — MICONAZOLE NITRATE: 20 CREAM TOPICAL at 09:30

## 2022-05-09 RX ADMIN — PANTOPRAZOLE SODIUM 40 MG: 40 INJECTION, POWDER, FOR SOLUTION INTRAVENOUS at 20:42

## 2022-05-09 RX ADMIN — CEFAZOLIN SODIUM 2000 MG: 10 INJECTION, POWDER, FOR SOLUTION INTRAVENOUS at 15:02

## 2022-05-09 RX ADMIN — Medication 15 ML: at 09:29

## 2022-05-09 ASSESSMENT — PAIN DESCRIPTION - LOCATION
LOCATION: BACK

## 2022-05-09 ASSESSMENT — PAIN SCALES - GENERAL
PAINLEVEL_OUTOF10: 8
PAINLEVEL_OUTOF10: 9
PAINLEVEL_OUTOF10: 8
PAINLEVEL_OUTOF10: 7
PAINLEVEL_OUTOF10: 8
PAINLEVEL_OUTOF10: 0
PAINLEVEL_OUTOF10: 0

## 2022-05-09 NOTE — CARE COORDINATION
CM / SW cont to follow for  D/c planning:     [facility]) at [phone]. in admissions:  She said they will  NOT take the patient back. No pre cert  Needed;    [address] [facility] [id number]       Phone: [phone]       Fax: [phone]          CM  Will d/w  Pt  And  Request  Additional referrals  To be  Faxed . Electronically signed by Gutierrez Mcneill RN on 5/9/2022 at 9:21 AM        Gutierrez Mcneill RN Case Manager  [facility]  [address] [place] [address].   [facility] [address] [phone]  Fax [phone]

## 2022-05-09 NOTE — PROGRESS NOTES
Nephrology  Note                                                                                                                                                                                                                                                                                                                                                               Office : [phone]     Fax :[phone]              Patient's Name: NITA Dolanfues    Good uO   Cr better   No N/V/D   C/o pain     No labs today     Past Medical History:   Diagnosis Date    CHF (congestive heart failure) ([address]     Hyperlipidemia     Obesity     Tracheostomy in place [facility])        Past Surgical History:   Procedure Laterality Date    UPPER GASTROINTESTINAL ENDOSCOPY N/A 3/31/2022    EGD BIOPSY performed by Russell Jay MD at [address] [place] [address] reviewed. No pertinent family history. reports that she has quit smoking. She has never used smokeless tobacco. She reports previous alcohol use. She reports that she does not use drugs. Allergies:  Patient has no known allergies.     Current Medications:    calcium carbonate (TUMS) chewable tablet 500 mg, TID PRN  oxyCODONE (ROXICODONE) immediate release tablet 10 mg, Q4H PRN  lactulose (CHRONULAC) 10 GM/15ML solution 10 g, Daily  rifAXIMin (XIFAXAN) tablet 550 mg, BID  piperacillin-tazobactam (ZOSYN) 3,375 mg in dextrose 5 % 50 mL IVPB extended infusion (mini-bag), Q8H  miconazole (MICOTIN) 2 % cream, BID  lidocaine 4 % external patch 1 patch, Daily  ipratropium-albuterol (DUONEB) nebulizer solution 3 mL, Q4H PRN  metoprolol succinate (TOPROL XL) extended release tablet 25 mg, Daily  CENTRUM/CERTA-MARJAN with minerals oral solution 15 mL, Daily  vitamin D (CHOLECALCIFEROL) capsule 5,000 Units, Daily  sodium chloride flush 0.9 % injection 5-40 mL, 2 times per day  sodium chloride flush 0.9 % injection 5-40 mL, PRN  0.9 % sodium chloride infusion, PRN  enoxaparin (LOVENOX) injection 40 mg, Daily  ondansetron (ZOFRAN-ODT) disintegrating tablet 4 mg, Q8H PRN   Or  ondansetron (ZOFRAN) injection 4 mg, Q6H PRN  polyethylene glycol (GLYCOLAX) packet 17 g, Daily PRN  pantoprazole (PROTONIX) injection 40 mg, BID  insulin lispro (1 Unit Dial) 0-12 Units, TID WC  insulin lispro (1 Unit Dial) 0-6 Units, Nightly  glucagon (rDNA) injection 1 mg, PRN  dextrose 5 % solution, PRN  dextrose bolus 10% 250 mL, PRN  glucose chewable tablet 16 g, PRN  midodrine (PROAMATINE) tablet 5 mg, TID WC  octreotide (SANDOSTATIN) injection 150 mcg, Q8H        Review of Systems:   14 point ROS obtained but were negative except mentioned in HPI      Physical exam:     Vitals:  /67   Pulse 60   Temp 98.1 °F (36.7 °C) (Oral)   Resp 16   Ht 5' 6\" (1.676 m)   Wt 199 lb 8 oz (90.5 kg)   LMP 03/31/1982   SpO2 96%   BMI 32.20 kg/m²   Constitutional:  OAA X3 NAD  Skin: no rash, turgor wnl  Heent:  eomi, mmm  Neck: no bruits or jvd noted  Cardiovascular:  S1, S2 without m/r/g  Respiratory: dec BS   Abdomen:  +bs, soft, nt, nd  Ext: ++ lower extremity edema  Psychiatric: mood and affect flat   Musculoskeletal:  Rom, muscular strength intact    Data:   Labs:  CBC:   Recent Labs     05/07/22 0919 05/08/22 0818   WBC 5.5 4.1   HGB 12.4 11.5*    132*     BMP:    Recent Labs     05/07/22 0919 05/08/22 0818    137   K 3.4* 3.4*   CL 99 100   CO2 24 27   BUN 35* 22*   CREATININE 1.4* 1.2*   GLUCOSE 153* 158*     Ca/Mg/Phos:   Recent Labs     05/07/22 0919 05/08/22 0818   CALCIUM 10.2 10.0   MG 1.60* 1.50*     Hepatic:   Recent Labs     05/07/22 0919 05/08/22 0818   AST 22 17   ALT 12 11   BILITOT 1.4* 1.4*   ALKPHOS 140* 124     Troponin: No results for input(s): TROPONINI in the last 72 hours. BNP: No results for input(s): BNP in the last 72 hours. Lipids: No results for input(s): CHOL, TRIG, HDL, LDLCALC, LABVLDL in the last 72 hours.   ABGs: No results for input(s): PHART, PO2ART, OOD6TWY in the last 72 hours. INR:   No results for input(s): INR in the last 72 hours. UA:  No results for input(s): Eliazar [employer], GLUCOSEU, BILIRUBINUR, KETUA, SPECGRAV, BLOODU, PHUR, PROTEINU, UROBILINOGEN, NITRU, LEUKOCYTESUR, Ki Alderman in the last 72 hours. Urine Microscopic:   No results for input(s): LABCAST, BACTERIA, COMU, HYALCAST, WBCUA, RBCUA, EPIU in the last 72 hours. Urine Culture:   No results for input(s): LABURIN in the last 72 hours. Urine Chemistry: No results for input(s): Amy Concha, PROTEINUR, NAUR in the last 72 hours. IMAGING:  MRI THORACIC SPINE W WO CONTRAST   Final Result      Discitis and osteomyelitis at T9-10 along with a small epidural abscess/phlegmon and paraspinal inflammatory change. MRI LUMBAR SPINE W WO CONTRAST   Final Result      Discitis and osteomyelitis at T9-10 along with a small epidural abscess/phlegmon and paraspinal inflammatory change. US ABDOMEN LIMITED   Final Result      No ascites identified               CT HEAD WO CONTRAST   Final Result      No acute intracranial pathology                 XR CHEST PORTABLE   Final Result      No acute cardiopulmonary disease. Assessment/Plan   1. AMS     2. Alc cirrhosis     3. Anemia    4. Acid- base/ Electrolyte imbalance     5.  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

## 2022-05-09 NOTE — PROGRESS NOTES
Hospitalist Progress Note      PCP: No primary care provider on file. Date of Admission: 5/5/2022    Chief Complaint: [address] Course: 59 y.o. female with a past medical history of alcoholic cirrhosis, COVID-pneumonia complicated by respiratory failure status post tracheostomy (no vent requirement), diabetes, who presents from her living facility with altered mental status and possible ingestion.  Patient currently resides in a nursing home [facility]) and was noted to be confused this morning upon awakening.  Squad also states that patient's nursing home reported a visitor around 3 AM, with concern for administering Tylenol PM.  Patient admits to taking 2 these, but denies any other ingestions today.  Patient reporting pain in her back as well as in her abdomen, she has no shortness of breath or chest pain.  No fevers that she is reporting.     Nursing facility reports she has some visitor that frequently visits her and everytime after the visitor comes she would become very lethargic and drowsy. It was reported that she was visited by the visitors again and was given narcan afterwards. But prior UDS neg for opioids.      Subjective: BACK PAIN SEEM TO BE PRETTY BAD, MRI showed OM/Discitis      Medications:  Reviewed    Infusion Medications    sodium chloride 30 mL (05/09/22 0740)    dextrose       Scheduled Medications    lactulose  10 g Oral Daily    rifAXIMin  550 mg Oral BID    piperacillin-tazobactam  3,375 mg IntraVENous Q8H    miconazole   Topical BID    lidocaine  1 patch TransDERmal Daily    metoprolol succinate  25 mg Oral Daily    CENTRUM/CERTA-MARJAN with minerals oral  15 mL Oral Daily    vitamin D  5,000 Units Oral Daily    sodium chloride flush  5-40 mL IntraVENous 2 times per day    enoxaparin  40 mg SubCUTAneous Daily    pantoprazole  40 mg IntraVENous BID    insulin lispro  0-12 Units SubCUTAneous TID     insulin lispro  0-6 Units SubCUTAneous Nightly     PRN Meds: calcium carbonate, oxyCODONE, ipratropium-albuterol, sodium chloride flush, sodium chloride, ondansetron **OR** ondansetron, polyethylene glycol, glucagon (rDNA), dextrose, dextrose bolus, glucose      Intake/Output Summary (Last 24 hours) at 5/9/2022 1049  Last data filed at 5/9/2022 4763  Gross per 24 hour   Intake 1017.27 ml   Output 350 ml   Net 667.27 ml       Physical Exam Performed:    /67   Pulse 60   Temp 98.1 °F (36.7 °C) (Oral)   Resp 16   Ht 5' 6\" (1.676 m)   Wt 199 lb 8 oz (90.5 kg)   LMP 03/31/1982   SpO2 96%   BMI 32.20 kg/m²     General appearance: chronically ill appearing, appears stated age and cooperative. HEENT: Pupils equal, round, and reactive to light. Conjunctivae/corneas clear. Neck: Supple, with full range of motion. No jugular venous distention. Trachea midline. Respiratory:  Normal respiratory effort. Clear to auscultation, bilaterally without Rales/Wheezes/Rhonchi. Cardiovascular: Regular rate and rhythm with normal S1/S2 without murmurs, rubs or gallops. Abdomen: Soft, non-tender, non-distended with normal bowel sounds. Musculoskeletal: No clubbing, cyanosis or edema bilaterally. Full range of motion without deformity. Skin: Skin color, texture, turgor normal.  No rashes or lesions. Neurologic:  Neurovascularly intact without any focal sensory/motor deficits.  Cranial nerves: II-XII intact, grossly non-focal.  Psychiatric: less confused today  Capillary Refill: Brisk,3 seconds, normal   Peripheral Pulses: +2 palpable, equal bilaterally       Labs:   Recent Labs     05/07/22 0919 05/08/22 0818   WBC 5.5 4.1   HGB 12.4 11.5*   HCT 37.2 34.9*    132*     Recent Labs     05/07/22  0919 05/08/22 0818    137   K 3.4* 3.4*   CL 99 100   CO2 24 27   BUN 35* 22*   CREATININE 1.4* 1.2*   CALCIUM 10.2 10.0     Recent Labs     05/07/22 0919 05/08/22 0818   AST 22 17   ALT 12 11   BILITOT 1.4* 1.4*   ALKPHOS 140* 124     No results for input(s): INR in the last 72 hours. No results for input(s): Catracho Quintero in the last 72 hours. Urinalysis:      Lab Results   Component Value Date    NITRU POSITIVE 05/05/2022    WBCUA 3-5 05/05/2022    BACTERIA 4+ 05/05/2022    RBCUA None seen 05/05/2022    BLOODU Negative 05/05/2022    SPECGRAV 1.010 05/05/2022    GLUCOSEU Negative 05/05/2022       Radiology:  MRI THORACIC SPINE W WO CONTRAST   Final Result      Discitis and osteomyelitis at T9-10 along with a small epidural abscess/phlegmon and paraspinal inflammatory change. MRI LUMBAR SPINE W WO CONTRAST   Final Result      Discitis and osteomyelitis at T9-10 along with a small epidural abscess/phlegmon and paraspinal inflammatory change. US ABDOMEN LIMITED   Final Result      No ascites identified               CT HEAD WO CONTRAST   Final Result      No acute intracranial pathology                 XR CHEST PORTABLE   Final Result      No acute cardiopulmonary disease. Assessment/Plan:    Active Hospital Problems    Diagnosis     Acute metabolic encephalopathy [C62.73]      Priority: Medium    Hyperammonemia ([address] [E72.20]      Priority: Medium    Hyponatremia [E87.1]      Priority: Medium    Azotemia [R79.89]      Priority: Medium    MALENA (acute kidney injury) ([facility] [address] [N17.9]      Priority: Medium    Altered mental status [R41.82]      Priority: Medium     Acute metabolic encephalopathy likely 2/2 hepatic encephalopathy  Drug induced encephalopathy is another possibility  Alcoholic cirrhosis  - nursing home report there is a visitor that frequently sees her and every time that visitor left she becomes more confused and lethargic however UDS neg for opioids.  But she was given narcan prior to transfer to hospital  - HCV abx negative (1/13/22), HBV negative (not vaccinated)  - ammonia is elevated to 93  - IVF  - lactulose and rifaximin  - consulted neurology  - consulted GI  - stopped gabapentin and seroquel    BACK PAIN due to OM/Discitis T9-T10 with epidural abscess  - SEEM TO BE SEVERE  - WILL DO MRI TO RULE OUT OM OR DISCITIS  - OXYCODONE PRN  - Discitis and osteomyelitis at T9-10 along with a small epidural abscess/phlegmon and paraspinal inflammatory change. MSSA bacteremia  UTI (klebsiella and e. Coli 5/5/22)  OM/Discitis of T9-T10 with epidural abscess  - Rocephin dose increased to 2 G  - consulted ID  - ECHO neg  - IV zosyn  - repeat blood culture pending  - MRI of T spine showed Discitis and osteomyelitis at T9-10 along with a small epidural abscess/phlegmon and paraspinal inflammatory change. - ID TO FINALIZE ABX     MALENA improving  Hyponatremia  - likely pre-renal  - holding spironolactone and torsemide  - IVF/albumin  - consulted nephrology    PUD  - March 2022 EGD showed 3 clean based antral ulcers and duodenal ulcers  - IV PPI  - will need follow up with Dr. Michelle Peterson     UTI  - was treated for UTI during last admission, previous urine culture showed klebsialla and proteus both sensitive to rocephin  - will start IV rocephin for now  - UA positive again, culture showed klebsiella and e.coli  - rocephin dose increased due to bacteremia  - switched to IV zosyn per ID     DMII with neuropathy  - sliding scale  - gabapentin reduced dose     Chronic hypoxic resp 2/2 COVID PNA s/p trach  Chronic COPD not in exacerbation  - Hx of Staph aureus, Serratia, Streno per bronchs in 11/2021 - 12/2021  - has chronic trach  - COPD not in exacerbation  - duoneb PRN     Chronic Diastolic CHF  - hold diuretics  - TTE 1/2022 showed normal EF     Obesity  - recommend weight loss     Bipolar  - seroquel reduced dose               DVT Prophylaxis: heparin  Diet: ADULT DIET;  Regular  Code Status: Full Code    PT/OT Eval Status: pending    Dispo - wards    Jackie Street,

## 2022-05-09 NOTE — PROGRESS NOTES
ID Follow-up NOTE    CC:   Staphylococcus aureus bacteremia, Thoracic osteo-diskitis  Antibiotics Zosyn    Admit Date: 5/5/2022  Hospital Day: 5    Subjective:     Patient c/o LBP, 'middle' of back, no radiation  No urinary complaints      Objective:     Patient Vitals for the past 8 hrs:   BP Temp Temp src Pulse Resp SpO2   05/09/22 1254 -- -- -- 62 -- 97 %   05/09/22 1130 (!) 112/49 98.2 °F (36.8 °C) Oral 61 18 96 %   05/09/22 0858 -- -- -- 60 -- --   05/09/22 0749 104/67 98.1 °F (36.7 °C) Oral 59 16 96 %   05/09/22 0631 -- -- -- -- 18 --   05/09/22 0621 136/69 -- -- -- -- --     I/O last 3 completed shifts: In: 1137.3 [P.O.:780; I.V.:22; IV Piggyback:335.2]  Out: 1400 [Urine:1400]  No intake/output data recorded. EXAM:  GENERAL: No apparent distress.   RA  HEENT: Membranes moist, no oral lesion  NECK:  Supple, no lymphadenopathy  LUNGS: Clear b/l, no rales, no dullness  CARDIAC: RRR, no murmur appreciated  ABD:  + BS, soft / NT  EXT:  No rash, no edema, no lesions  NEURO: No focal neurologic findings  PSYCH: Orientation, sensorium, mood normal  LINES:  Peripheral iv       Data Review:  Lab Results   Component Value Date    WBC 4.1 05/08/2022    HGB 11.5 (L) 05/08/2022    HCT 34.9 (L) 05/08/2022    MCV 87.9 05/08/2022     (L) 05/08/2022     Lab Results   Component Value Date    CREATININE 1.2 (H) 05/08/2022    BUN 22 (H) 05/08/2022     05/08/2022    K 3.4 (L) 05/08/2022     05/08/2022    CO2 27 05/08/2022       Hepatic Function Panel:   Lab Results   Component Value Date    ALKPHOS 124 05/08/2022    ALT 11 05/08/2022    AST 17 05/08/2022    PROT 8.2 05/08/2022    BILITOT 1.4 05/08/2022    BILIDIR 0.3 05/05/2022    IBILI 1.2 05/05/2022    LABALBU 3.9 05/08/2022       MICRO:  5/5 BC x 1 S aureus   Antibiotic Interpretation Microscan    clindamycin Sensitive <=0.25 mcg/mL   erythromycin Resistant >=8 mcg/mL   oxacillin Sensitive <=0.25 mcg/mL   tetracycline Sensitive <=1 mcg/mL trimethoprim-sulfamethoxazole Sensitive <=10 mcg/mL      Urine cult >100k E coli, K pneumoniae  Klebsiella pneumoniae (2)  Antibiotic Interpretation Microscan    amoxicillin-clavulanate Sensitive <=8/4 mcg/mL   ampicillin Resistant >16 mcg/mL   ampicillin-sulbactam Sensitive <=8/4 mcg/mL   ceFAZolin Sensitive <=2 mcg/mL   cefepime Sensitive <=2 mcg/mL   cefTRIAXone Sensitive <=1 mcg/mL   cefuroxime Intermediate 16 mcg/mL   ciprofloxacin Sensitive <=1 mcg/mL   ertapenem Sensitive <=0.5 mcg/mL   gentamicin Sensitive <=4 mcg/mL   meropenem Sensitive <=1 mcg/mL   nitrofurantoin Resistant >64 mcg/mL   piperacillin-tazobactam Sensitive <=16 mcg/mL   trimethoprim-sulfamethoxazole Sensitive <=2/38 mcg/mL     Escherichia coli (1)  Antibiotic Interpretation Microscan    ampicillin Sensitive <=8 mcg/mL   ampicillin-sulbactam Sensitive <=8/4 mcg/mL   ceFAZolin Sensitive <=2 mcg/mL   cefepime Sensitive <=2 mcg/mL   cefTRIAXone Sensitive <=1 mcg/mL   cefuroxime Sensitive 8 mcg/mL   ciprofloxacin Sensitive <=1 mcg/mL   ertapenem Sensitive <=0.5 mcg/mL   gentamicin Sensitive <=4 mcg/mL   meropenem Sensitive <=1 mcg/mL   nitrofurantoin Sensitive <=32 mcg/mL   piperacillin-tazobactam Sensitive <=16 mcg/mL   trimethoprim-sulfamethoxazole Sensitive <=2/38 mcg/mL      BC x 2 no growth to date    IMAGIN/9 Lumbar MRI w/wo contrast  Discitis and osteomyelitis at T9-10 along with a small epidural abscess/phlegmon and paraspinal inflammatory change     Thoracic MRI w/wo contrast  Discitis and osteomyelitis at T9-10 along with a small epidural abscess/phlegmon and paraspinal inflammatory       Scheduled Meds:   lactulose  10 g Oral Daily    rifAXIMin  550 mg Oral BID    piperacillin-tazobactam  3,375 mg IntraVENous Q8H    miconazole   Topical BID    lidocaine  1 patch TransDERmal Daily    metoprolol succinate  25 mg Oral Daily    CENTRUM/CERTA-MARJAN with minerals oral  15 mL Oral Daily    vitamin D  5,000 Units Oral Daily    sodium chloride flush  5-40 mL IntraVENous 2 times per day    enoxaparin  40 mg SubCUTAneous Daily    pantoprazole  40 mg IntraVENous BID    insulin lispro  0-12 Units SubCUTAneous TID WC    insulin lispro  0-6 Units SubCUTAneous Nightly       Continuous Infusions:   sodium chloride 30 mL (05/09/22 0740)    dextrose         PRN Meds:  calcium carbonate, oxyCODONE, ipratropium-albuterol, sodium chloride flush, sodium chloride, ondansetron **OR** ondansetron, polyethylene glycol, glucagon (rDNA), dextrose, dextrose bolus, glucose      Assessment:     Hx DM, cirrhosis  Hx severe COVID-19 pneumonia with resp failure, trach  Lives at nursing facility    Encephalopathy   MSSA bacteremia  T9/10 osteo-diskitis    UTI vs bacteriuria - urine cult - E coli, Klebsiella (both S cefazolin)    Plan:     Change to ancef  F/u BC - no growth to date  Will review MRI     PICC - ok to place   See SAVANNAH    Medical Decision Making:   The following items were considered in medical decision making:  Discussion of patient care with other providers  Reviewed clinical lab tests  Reviewed radiology tests  Reviewed other diagnostic tests/interventions  Independent review of radiologic images - will review  Microbiology cultures and other micro tests reviewed      Discussed with pt  Octavio Lewis MD    INFUSION ORDERS:  Ancef 2 gm iv q 8 hr through 6/22  - Diagnosis - S aureus bacteremia, thoracic diskitis / vertebral osrteomyelitis  - First dose given in hospital  - PICC   - Disposition / date discharge  - Check CBC w diff, CMP, ESR, CRP every Mon or Tue - FAX result to [phone]  - Call with antibiotic / infusion issues, [phone]  - Call with any change in status, transfer in or out of a facility or to hospital - [phone]  - No f/u in outpatient ID office necessary

## 2022-05-09 NOTE — PROGRESS NOTES
Nephrology  Note                                                                                                                                                                                                                                                                                                                                                               Office : [phone]     Fax :[phone]              Patient's Name: NITA Etienne    Good uO   Cr better   No N/V/D     Past Medical History:   Diagnosis Date    CHF (congestive heart failure) ([address]     Hyperlipidemia     Obesity     Tracheostomy in place [facility])        Past Surgical History:   Procedure Laterality Date    UPPER GASTROINTESTINAL ENDOSCOPY N/A 3/31/2022    EGD BIOPSY performed by Sindy Espinoza MD at [facility] reviewed. No pertinent family history. reports that she has quit smoking. She has never used smokeless tobacco. She reports previous alcohol use. She reports that she does not use drugs. Allergies:  Patient has no known allergies.     Current Medications:    calcium carbonate (TUMS) chewable tablet 500 mg, TID PRN  oxyCODONE (ROXICODONE) immediate release tablet 10 mg, Q4H PRN  lactulose (CHRONULAC) 10 GM/15ML solution 10 g, Daily  rifAXIMin (XIFAXAN) tablet 550 mg, BID  piperacillin-tazobactam (ZOSYN) 3,375 mg in dextrose 5 % 50 mL IVPB extended infusion (mini-bag), Q8H  miconazole (MICOTIN) 2 % cream, BID  lidocaine 4 % external patch 1 patch, Daily  ipratropium-albuterol (DUONEB) nebulizer solution 3 mL, Q4H PRN  metoprolol succinate (TOPROL XL) extended release tablet 25 mg, Daily  CENTRUM/CERTA-MARJAN with minerals oral solution 15 mL, Daily  vitamin D (CHOLECALCIFEROL) capsule 5,000 Units, Daily  sodium chloride flush 0.9 % injection 5-40 mL, 2 times per day  sodium chloride flush 0.9 % injection 5-40 mL, PRN  0.9 % sodium chloride infusion, PRN  enoxaparin (LOVENOX) injection 40 mg, Daily  ondansetron (ZOFRAN-ODT) disintegrating tablet 4 mg, Q8H PRN   Or  ondansetron (ZOFRAN) injection 4 mg, Q6H PRN  polyethylene glycol (GLYCOLAX) packet 17 g, Daily PRN  pantoprazole (PROTONIX) injection 40 mg, BID  insulin lispro (1 Unit Dial) 0-12 Units, TID WC  insulin lispro (1 Unit Dial) 0-6 Units, Nightly  glucagon (rDNA) injection 1 mg, PRN  dextrose 5 % solution, PRN  dextrose bolus 10% 250 mL, PRN  glucose chewable tablet 16 g, PRN  midodrine (PROAMATINE) tablet 5 mg, TID WC  octreotide (SANDOSTATIN) injection 150 mcg, Q8H        Review of Systems:   14 point ROS obtained but were negative except mentioned in HPI      Physical exam:     Vitals:  /67   Pulse 60   Temp 97.9 °F (36.6 °C) (Oral)   Resp 18   Ht 5' 6\" (1.676 m)   Wt 199 lb 8 oz (90.5 kg)   LMP 03/31/1982   SpO2 96%   BMI 32.20 kg/m²   Constitutional:  OAA X3 NAD  Skin: no rash, turgor wnl  Heent:  eomi, mmm  Neck: no bruits or jvd noted  Cardiovascular:  S1, S2 without m/r/g  Respiratory: dec BS   Abdomen:  +bs, soft, nt, nd  Ext: ++ lower extremity edema  Psychiatric: mood and affect flat   Musculoskeletal:  Rom, muscular strength intact    Data:   Labs:  CBC:   Recent Labs     05/06/22  0756 05/07/22  0919 05/08/22  0818   WBC 3.6* 5.5 4.1   HGB 11.3* 12.4 11.5*    150 132*     BMP:    Recent Labs     05/06/22  0756 05/07/22  0919 05/08/22  0818    138 137   K 4.0 3.4* 3.4*   CL 98* 99 100   CO2 25 24 27   BUN 54* 35* 22*   CREATININE 1.5* 1.4* 1.2*   GLUCOSE 103* 153* 158*     Ca/Mg/Phos:   Recent Labs     05/06/22  0756 05/07/22  0919 05/08/22  0818   CALCIUM 9.6 10.2 10.0   MG  --  1.60* 1.50*     Hepatic:   Recent Labs     05/06/22  0756 05/07/22  0919 05/08/22  0818   AST 21 22 17   ALT 10 12 11   BILITOT 1.6* 1.4* 1.4*   ALKPHOS 139* 140* 124     Troponin: No results for input(s): TROPONINI in the last 72 hours. BNP: No results for input(s): BNP in the last 72 hours.   Lipids: No results for input(s): CHOL, TRIG, HDL, LDLCALC, LABVLDL in the last 72 hours. ABGs: No results for input(s): PHART, PO2ART, XQN4DBY in the last 72 hours. INR:   No results for input(s): INR in the last 72 hours. UA:  No results for input(s): Lucia Spatz, GLUCOSEU, BILIRUBINUR, KETUA, SPECGRAV, BLOODU, PHUR, PROTEINU, UROBILINOGEN, NITRU, LEUKOCYTESUR, Othelia Humberto in the last 72 hours. Urine Microscopic:   No results for input(s): LABCAST, BACTERIA, COMU, HYALCAST, WBCUA, RBCUA, EPIU in the last 72 hours. Urine Culture:   No results for input(s): LABURIN in the last 72 hours. Urine Chemistry: No results for input(s): Kirk Milad, PROTEINUR, NAUR in the last 72 hours. IMAGING:  US ABDOMEN LIMITED   Final Result      No ascites identified               CT HEAD WO CONTRAST   Final Result      No acute intracranial pathology                 XR CHEST PORTABLE   Final Result      No acute cardiopulmonary disease. MRI LUMBAR SPINE W WO CONTRAST    (Results Pending)   MRI THORACIC SPINE W WO CONTRAST    (Results Pending)       Assessment/Plan   1. AMS     2. Alc cirrhosis     3. Anemia    4. Acid- base/ Electrolyte imbalance     5.  MALENA     Plan   - octreotide + Midodrine + albumin  - Hold diuretics    - Ur studies - reviewed   - Monitor UO and renal function   - labs in am   - Chronic hypoxic resp 2/2 COVID PNA s/p trach    Recommend to dose adjust all medications  based on renal functions  Maintain SBP> 90 mmHg   Daily weights   AVOID NSAIDs  Avoid Nephrotoxins  Monitor Intake/Output  Call if significant decrease in urine output                 Thank you for allowing us to participate in care of Carmen Guzman MD  Feel free to contact me   [facility] [address]  Office : [phone]  Fax :[phone]

## 2022-05-10 LAB
6-ACETYLMORPHINE: NOT DETECTED
7-AMINOCLONAZEPAM: NOT DETECTED
A/G RATIO: 0.9 (ref 1.1–2.2)
ALBUMIN SERPL-MCNC: 3.9 G/DL (ref 3.4–5)
ALP BLD-CCNC: 129 U/L (ref 40–129)
ALPHA-OH-ALPRAZOLAM: NOT DETECTED
ALPHA-OH-MIDAZOLAM, URINE: NOT DETECTED
ALPRAZOLAM: NOT DETECTED
ALT SERPL-CCNC: 9 U/L (ref 10–40)
AMPHETAMINE: NOT DETECTED
ANION GAP SERPL CALCULATED.3IONS-SCNC: 13 MMOL/L (ref 3–16)
AST SERPL-CCNC: 19 U/L (ref 15–37)
BARBITURATES: NOT DETECTED
BASOPHILS ABSOLUTE: 0 K/UL (ref 0–0.2)
BASOPHILS RELATIVE PERCENT: 0.5 %
BENZOYLECGONINE: NOT DETECTED
BILIRUB SERPL-MCNC: 1.6 MG/DL (ref 0–1)
BUN BLDV-MCNC: 9 MG/DL (ref 7–20)
BUPRENORPHINE: NOT DETECTED
CALCIUM SERPL-MCNC: 10.1 MG/DL (ref 8.3–10.6)
CARISOPRODOL: NOT DETECTED
CHLORIDE BLD-SCNC: 100 MMOL/L (ref 99–110)
CLONAZEPAM: NOT DETECTED
CO2: 24 MMOL/L (ref 21–32)
CODEINE: NOT DETECTED
CREAT SERPL-MCNC: 0.7 MG/DL (ref 0.6–1.1)
CREATININE URINE: 111.4 MG/DL (ref 20–400)
DIAZEPAM: NOT DETECTED
DRUGS EXPECTED: 0
EER PAIN MGT DRUG PANEL, HIGH RES/EMIT U: NORMAL
EOSINOPHILS ABSOLUTE: 0.2 K/UL (ref 0–0.6)
EOSINOPHILS RELATIVE PERCENT: 4.1 %
ETHYL GLUCURONIDE: NOT DETECTED
FENTANYL: PRESENT
GABAPENTIN: NOT DETECTED
GFR AFRICAN AMERICAN: >60
GFR NON-AFRICAN AMERICAN: >60
GLUCOSE BLD-MCNC: 101 MG/DL (ref 70–99)
GLUCOSE BLD-MCNC: 106 MG/DL (ref 70–99)
GLUCOSE BLD-MCNC: 108 MG/DL (ref 70–99)
GLUCOSE BLD-MCNC: 134 MG/DL (ref 70–99)
HCT VFR BLD CALC: 40.2 % (ref 36–48)
HEMOGLOBIN: 12.6 G/DL (ref 12–16)
HYDROCODONE: NOT DETECTED
HYDROMORPHONE: NOT DETECTED
LORAZEPAM: NOT DETECTED
LYMPHOCYTES ABSOLUTE: 1.1 K/UL (ref 1–5.1)
LYMPHOCYTES RELATIVE PERCENT: 24.7 %
MAGNESIUM: 1.3 MG/DL (ref 1.8–2.4)
MARIJUANA METABOLITE: PRESENT
MCH RBC QN AUTO: 29.2 PG (ref 26–34)
MCHC RBC AUTO-ENTMCNC: 31.3 G/DL (ref 31–36)
MCV RBC AUTO: 93.1 FL (ref 80–100)
MDA: NOT DETECTED
MDEA: NOT DETECTED
MDMA URINE: NOT DETECTED
MEPERIDINE: NOT DETECTED
METHADONE: NOT DETECTED
METHAMPHETAMINE: NOT DETECTED
METHYLPHENIDATE: NOT DETECTED
MIDAZOLAM: NOT DETECTED
MONOCYTES ABSOLUTE: 0.4 K/UL (ref 0–1.3)
MONOCYTES RELATIVE PERCENT: 9.5 %
MORPHINE: NOT DETECTED
NALOXONE: NOT DETECTED
NEUTROPHILS ABSOLUTE: 2.6 K/UL (ref 1.7–7.7)
NEUTROPHILS RELATIVE PERCENT: 61.2 %
NORBUPRENORPHINE, FREE: NOT DETECTED
NORDIAZEPAM: NOT DETECTED
NORFENTANYL: PRESENT
NORHYDROCODONE, URINE: NOT DETECTED
NOROXYCODONE: NOT DETECTED
NOROXYMORPHONE, URINE: NOT DETECTED
OXAZEPAM: NOT DETECTED
OXYCODONE: NOT DETECTED
OXYMORPHONE: NOT DETECTED
PAIN MANAGEMENT DRUG PANEL: NORMAL
PAIN MANAGEMENT DRUG PANEL: NORMAL
PCP: NOT DETECTED
PDW BLD-RTO: 15.4 % (ref 12.4–15.4)
PERFORMED ON: ABNORMAL
PHENTERMINE: NOT DETECTED
PLATELET # BLD: 94 K/UL (ref 135–450)
PMV BLD AUTO: 7.6 FL (ref 5–10.5)
POTASSIUM REFLEX MAGNESIUM: 3.3 MMOL/L (ref 3.5–5.1)
PREGABALIN: NOT DETECTED
RBC # BLD: 4.31 M/UL (ref 4–5.2)
SODIUM BLD-SCNC: 137 MMOL/L (ref 136–145)
TAPENTADOL, URINE: NOT DETECTED
TAPENTADOL-O-SULFATE, URINE: NOT DETECTED
TEMAZEPAM: NOT DETECTED
TOTAL PROTEIN: 8.2 G/DL (ref 6.4–8.2)
TRAMADOL: NOT DETECTED
WBC # BLD: 4.3 K/UL (ref 4–11)
ZOLPIDEM: NOT DETECTED

## 2022-05-10 PROCEDURE — 6360000002 HC RX W HCPCS: Performed by: INTERNAL MEDICINE

## 2022-05-10 PROCEDURE — 6370000000 HC RX 637 (ALT 250 FOR IP): Performed by: INTERNAL MEDICINE

## 2022-05-10 PROCEDURE — 97162 PT EVAL MOD COMPLEX 30 MIN: CPT

## 2022-05-10 PROCEDURE — 99232 SBSQ HOSP IP/OBS MODERATE 35: CPT | Performed by: INTERNAL MEDICINE

## 2022-05-10 PROCEDURE — 36569 INSJ PICC 5 YR+ W/O IMAGING: CPT

## 2022-05-10 PROCEDURE — C1751 CATH, INF, PER/CENT/MIDLINE: HCPCS

## 2022-05-10 PROCEDURE — 97167 OT EVAL HIGH COMPLEX 60 MIN: CPT

## 2022-05-10 PROCEDURE — 97530 THERAPEUTIC ACTIVITIES: CPT

## 2022-05-10 PROCEDURE — C1769 GUIDE WIRE: HCPCS

## 2022-05-10 PROCEDURE — 1200000000 HC SEMI PRIVATE

## 2022-05-10 PROCEDURE — 99233 SBSQ HOSP IP/OBS HIGH 50: CPT | Performed by: INTERNAL MEDICINE

## 2022-05-10 PROCEDURE — 2580000003 HC RX 258: Performed by: INTERNAL MEDICINE

## 2022-05-10 PROCEDURE — 02HV33Z INSERTION OF INFUSION DEVICE INTO SUPERIOR VENA CAVA, PERCUTANEOUS APPROACH: ICD-10-PCS | Performed by: INTERNAL MEDICINE

## 2022-05-10 PROCEDURE — 83735 ASSAY OF MAGNESIUM: CPT

## 2022-05-10 PROCEDURE — 2500000003 HC RX 250 WO HCPCS: Performed by: INTERNAL MEDICINE

## 2022-05-10 PROCEDURE — 85025 COMPLETE CBC W/AUTO DIFF WBC: CPT

## 2022-05-10 PROCEDURE — 36415 COLL VENOUS BLD VENIPUNCTURE: CPT

## 2022-05-10 PROCEDURE — 80053 COMPREHEN METABOLIC PANEL: CPT

## 2022-05-10 PROCEDURE — C9113 INJ PANTOPRAZOLE SODIUM, VIA: HCPCS | Performed by: INTERNAL MEDICINE

## 2022-05-10 RX ORDER — SODIUM CHLORIDE 9 MG/ML
25 INJECTION, SOLUTION INTRAVENOUS PRN
Status: DISCONTINUED | OUTPATIENT
Start: 2022-05-10 | End: 2022-05-17 | Stop reason: HOSPADM

## 2022-05-10 RX ORDER — LIDOCAINE HYDROCHLORIDE 10 MG/ML
5 INJECTION, SOLUTION EPIDURAL; INFILTRATION; INTRACAUDAL; PERINEURAL ONCE
Status: COMPLETED | OUTPATIENT
Start: 2022-05-10 | End: 2022-05-10

## 2022-05-10 RX ORDER — LACTULOSE 10 G/15ML
10 SOLUTION ORAL DAILY
Qty: 450 ML | Refills: 0
Start: 2022-05-10 | End: 2022-06-09

## 2022-05-10 RX ORDER — PANTOPRAZOLE SODIUM 40 MG/1
40 TABLET, DELAYED RELEASE ORAL
Qty: 180 TABLET | Refills: 1
Start: 2022-05-10

## 2022-05-10 RX ORDER — OXYCODONE HYDROCHLORIDE 10 MG/1
10 TABLET ORAL EVERY 4 HOURS PRN
Qty: 8 TABLET | Refills: 0 | Status: SHIPPED | OUTPATIENT
Start: 2022-05-10 | End: 2022-05-13

## 2022-05-10 RX ORDER — METOPROLOL SUCCINATE 25 MG/1
25 TABLET, EXTENDED RELEASE ORAL DAILY
Qty: 30 TABLET | Refills: 3
Start: 2022-05-10

## 2022-05-10 RX ORDER — MAGNESIUM SULFATE IN WATER 40 MG/ML
2000 INJECTION, SOLUTION INTRAVENOUS ONCE
Status: COMPLETED | OUTPATIENT
Start: 2022-05-10 | End: 2022-05-10

## 2022-05-10 RX ORDER — SODIUM CHLORIDE 0.9 % (FLUSH) 0.9 %
5-40 SYRINGE (ML) INJECTION PRN
Status: DISCONTINUED | OUTPATIENT
Start: 2022-05-10 | End: 2022-05-17 | Stop reason: HOSPADM

## 2022-05-10 RX ORDER — POTASSIUM CHLORIDE 20 MEQ/1
40 TABLET, EXTENDED RELEASE ORAL ONCE
Status: COMPLETED | OUTPATIENT
Start: 2022-05-10 | End: 2022-05-10

## 2022-05-10 RX ORDER — MAGNESIUM SULFATE IN WATER 40 MG/ML
4000 INJECTION, SOLUTION INTRAVENOUS ONCE
Status: COMPLETED | OUTPATIENT
Start: 2022-05-10 | End: 2022-05-10

## 2022-05-10 RX ORDER — SODIUM CHLORIDE 0.9 % (FLUSH) 0.9 %
5-40 SYRINGE (ML) INJECTION EVERY 12 HOURS SCHEDULED
Status: DISCONTINUED | OUTPATIENT
Start: 2022-05-10 | End: 2022-05-17 | Stop reason: HOSPADM

## 2022-05-10 RX ADMIN — PANTOPRAZOLE SODIUM 40 MG: 40 INJECTION, POWDER, FOR SOLUTION INTRAVENOUS at 21:38

## 2022-05-10 RX ADMIN — OXYCODONE 10 MG: 5 TABLET ORAL at 09:16

## 2022-05-10 RX ADMIN — MAGNESIUM SULFATE HEPTAHYDRATE 4000 MG: 40 INJECTION, SOLUTION INTRAVENOUS at 13:42

## 2022-05-10 RX ADMIN — MICONAZOLE NITRATE: 20 CREAM TOPICAL at 21:39

## 2022-05-10 RX ADMIN — METOPROLOL SUCCINATE 25 MG: 25 TABLET, FILM COATED, EXTENDED RELEASE ORAL at 12:46

## 2022-05-10 RX ADMIN — CEFAZOLIN SODIUM 2000 MG: 10 INJECTION, POWDER, FOR SOLUTION INTRAVENOUS at 16:26

## 2022-05-10 RX ADMIN — PANTOPRAZOLE SODIUM 40 MG: 40 INJECTION, POWDER, FOR SOLUTION INTRAVENOUS at 12:45

## 2022-05-10 RX ADMIN — OXYCODONE 10 MG: 5 TABLET ORAL at 21:38

## 2022-05-10 RX ADMIN — Medication 15 ML: at 16:40

## 2022-05-10 RX ADMIN — OXYCODONE 10 MG: 5 TABLET ORAL at 12:45

## 2022-05-10 RX ADMIN — SODIUM CHLORIDE, PRESERVATIVE FREE 10 ML: 5 INJECTION INTRAVENOUS at 21:38

## 2022-05-10 RX ADMIN — SODIUM CHLORIDE, PRESERVATIVE FREE 10 ML: 5 INJECTION INTRAVENOUS at 21:39

## 2022-05-10 RX ADMIN — SODIUM CHLORIDE, PRESERVATIVE FREE 10 ML: 5 INJECTION INTRAVENOUS at 12:44

## 2022-05-10 RX ADMIN — SODIUM CHLORIDE 100 ML/HR: 9 INJECTION, SOLUTION INTRAVENOUS at 16:24

## 2022-05-10 RX ADMIN — LIDOCAINE HYDROCHLORIDE 5 ML: 10 INJECTION, SOLUTION EPIDURAL; INFILTRATION; INTRACAUDAL; PERINEURAL at 15:07

## 2022-05-10 RX ADMIN — OXYCODONE 10 MG: 5 TABLET ORAL at 16:43

## 2022-05-10 RX ADMIN — RIFAXIMIN 550 MG: 550 TABLET ORAL at 21:38

## 2022-05-10 RX ADMIN — Medication 5000 UNITS: at 12:46

## 2022-05-10 RX ADMIN — SODIUM CHLORIDE 25 ML: 9 INJECTION, SOLUTION INTRAVENOUS at 13:39

## 2022-05-10 RX ADMIN — MAGNESIUM SULFATE HEPTAHYDRATE 2000 MG: 2 INJECTION, SOLUTION INTRAVENOUS at 18:06

## 2022-05-10 RX ADMIN — MICONAZOLE NITRATE: 20 CREAM TOPICAL at 13:33

## 2022-05-10 RX ADMIN — CEFAZOLIN SODIUM 2000 MG: 10 INJECTION, POWDER, FOR SOLUTION INTRAVENOUS at 06:04

## 2022-05-10 RX ADMIN — RIFAXIMIN 550 MG: 550 TABLET ORAL at 12:46

## 2022-05-10 RX ADMIN — POTASSIUM CHLORIDE 40 MEQ: 1500 TABLET, EXTENDED RELEASE ORAL at 12:46

## 2022-05-10 RX ADMIN — CEFAZOLIN SODIUM 2000 MG: 10 INJECTION, POWDER, FOR SOLUTION INTRAVENOUS at 23:05

## 2022-05-10 RX ADMIN — OXYCODONE 10 MG: 5 TABLET ORAL at 02:31

## 2022-05-10 ASSESSMENT — PAIN SCALES - GENERAL
PAINLEVEL_OUTOF10: 8

## 2022-05-10 ASSESSMENT — PAIN DESCRIPTION - PAIN TYPE: TYPE: ACUTE PAIN

## 2022-05-10 ASSESSMENT — PAIN DESCRIPTION - ORIENTATION
ORIENTATION: LOWER

## 2022-05-10 ASSESSMENT — PAIN DESCRIPTION - DESCRIPTORS
DESCRIPTORS: SHARP
DESCRIPTORS: STABBING

## 2022-05-10 ASSESSMENT — PAIN - FUNCTIONAL ASSESSMENT: PAIN_FUNCTIONAL_ASSESSMENT: PREVENTS OR INTERFERES SOME ACTIVE ACTIVITIES AND ADLS

## 2022-05-10 ASSESSMENT — PAIN DESCRIPTION - FREQUENCY: FREQUENCY: CONTINUOUS

## 2022-05-10 ASSESSMENT — PAIN DESCRIPTION - LOCATION
LOCATION: BACK

## 2022-05-10 NOTE — PROGRESS NOTES
Physical Therapy  Facility/Department: [address]  Physical Therapy Initial Assessment and Treatment    Name: Taylor To  : 1965  MRN: [id number]  Date of Service: 5/10/2022    Discharge Recommendations:  NITA ALLEN Rehfues scored a 12/24 on the AM-PAC short mobility form. Current research shows that an AM-PAC score of 17 or less is typically not associated with a discharge to the patient's home setting. Based on the patient's AM-PAC score and their current functional mobility deficits, it is recommended that the patient have 3-5 sessions per week of Physical Therapy at d/c to increase the patient's independence. Please see assessment section for further patient specific details. PT Equipment Recommendations  Equipment Needed: No        Assessment   Assessment: Pt from SNF with AMS and work-up (+) for thoracic spine osteomyelitis. Pt is a poor historian about timeline of events and details about SNF. Pt wrything in pain, limiting participation and activity in PT. Pt unable to tolerate EOB sitting activity or OOB due to pain. Will progress activity as pt tolerates. Pt will need continued skilled PT at d/c to increase strength and maximize mobility. Will follow. Treatment Diagnosis: Decreased mobility associated with MALENA. Decision Making: Medium Complexity  Requires PT Follow-Up: Yes     Plan   Plan:  (2-5)  Current Treatment Recommendations: Transfer training,Functional mobility training,Gait training,Strengthening    Safety Devices  Type of Devices: Left in bed,Bed alarm in place,Call light within reach,Nurse notified     Restrictions  Position Activity Restriction  Other position/activity restrictions: Up with assist     Subjective   Chart Reviewed: Yes  Additional Pertinent Hx: Pt to ED  with AMS and possible ingestion. Pt admitted for MALENA. CXR (-). Head CT (-). Abd US (-). MRI spine: Discitis and osteomyelitis at T9-10 along with a small epidural abscess.   PMH:  alcoholic cirrhosis, COVID-pneumonia complicated by respiratory failure s/p trach (no vent requirement), DM  Diagnosis: MALENA    Subjective  Subjective: Pt found supine in bed. Pt restless and wrything in pain. \"It's so bad. I don't think I am going to make it out of here. \"  RN notified of pain and meds given. Social/Functional History  Type of Home: Facility ([address])  Additional Comments: Pt poor historian and reports not getting out of bed at facility due to pain. Pt does say she was using a walker when she could participate in therapy. Pt's ultimate goal is to return home. Pt lives alone. Vision/Hearing  Hearing: Within functional limits    Vision:  Within functional limits    Cognition   Orientation  Orientation Level: Disoriented to time;Disoriented to situation;Oriented to place;Oriented to person     Objective   Gross Assessment  AROM: Within functional limits  Strength: Generally decreased, functional     Bed mobility  Bridging: Stand by assistance (barely able to clear buttocks up off bed)  Rolling to Left: Stand by assistance (verbal cues, reaching for rail, pt rolled multiple times)  Rolling to Right: Stand by assistance (verbal cues, reaching for rail, pt rolled multiple times)  Supine to Sit: Minimal assistance (log roll with use of rail, verbal cues and encouragement)  Sit to Supine: Stand by assistance     Transfers  Sit to Stand: Unable to assess  Stand to sit: Unable to assess  Bed to Chair: Unable to assess  Comment: Pt refusing due to increasd pain     Balance  Sitting - Static: Poor (Min A at EOB.   Pt sat x2-3 seconds before returning self to supine due to increased pain)      AM-PAC Score  AM-PAC Inpatient Mobility Raw Score : 12 (05/10/22 0954)  AM-PAC Inpatient T-Scale Score : 35.33 (05/10/22 0954)  Mobility Inpatient CMS 0-100% Score: 68.66 (05/10/22 0954)  Mobility Inpatient CMS G-Code Modifier : CL (05/10/22 0667)     Goals  Short Term Goals  Time Frame for Short term goals: Discharge  Short term goal 1: supine <> sit supervision  Short term goal 2: Tolerate sitting activity x5 min with supervision for balance  Patient Goals   Patient goals : Ultimate goal is to return home    Therapy Time   Individual Concurrent Group Co-treatment   Time In 0848         Time Out 0931         Minutes 43         Timed Code Treatment Minutes:  30  Total Treatment Minutes:  [address], PT

## 2022-05-10 NOTE — PROGRESS NOTES
ID Follow-up NOTE    CC:   Staphylococcus aureus bacteremia, Thoracic osteo-diskitis  Antibiotics Ancef    Admit Date: 5/5/2022  Hospital Day: 6    Subjective:     Patient c/o LBP, 'middle' of back, no radiation - only wants to lie flat due to pain  No urinary complaints    Objective:     Patient Vitals for the past 8 hrs:   BP Temp Temp src Pulse Resp SpO2   05/10/22 0959 128/68 98.5 °F (36.9 °C) Oral 68 18 97 %   05/10/22 0400 133/72 98.4 °F (36.9 °C) Oral 68 18 95 %     I/O last 3 completed shifts: In: 657.3 [P.O.:300; I.V.:22; IV Piggyback:335.2]  Out: 250 [Urine:250]  No intake/output data recorded. EXAM:  GENERAL: No apparent distress.   RA  HEENT: Membranes moist, no oral lesion  NECK:  Supple, no lymphadenopathy  LUNGS: Clear b/l, no rales, no dullness  CARDIAC: RRR, no murmur appreciated  ABD:  + BS, soft / NT  EXT:  No rash, no edema, no lesions  NEURO: No focal neurologic findings  PSYCH: Orientation, sensorium, mood normal  LINES:  Peripheral iv       Data Review:  Lab Results   Component Value Date    WBC 4.3 05/10/2022    HGB 12.6 05/10/2022    HCT 40.2 05/10/2022    MCV 93.1 05/10/2022    PLT 94 (L) 05/10/2022     Lab Results   Component Value Date    CREATININE 0.7 05/10/2022    BUN 9 05/10/2022     05/10/2022    K 3.3 (L) 05/10/2022     05/10/2022    CO2 24 05/10/2022       Hepatic Function Panel:   Lab Results   Component Value Date    ALKPHOS 129 05/10/2022    ALT 9 05/10/2022    AST 19 05/10/2022    PROT 8.2 05/10/2022    BILITOT 1.6 05/10/2022    BILIDIR 0.3 05/05/2022    IBILI 1.2 05/05/2022    LABALBU 3.9 05/10/2022       MICRO:  5/5 BC x 1 S aureus   Antibiotic Interpretation Microscan    clindamycin Sensitive <=0.25 mcg/mL   erythromycin Resistant >=8 mcg/mL   oxacillin Sensitive <=0.25 mcg/mL   tetracycline Sensitive <=1 mcg/mL   trimethoprim-sulfamethoxazole Sensitive <=10 mcg/mL     5/5 Urine cult >100k E coli, K pneumoniae  Klebsiella pneumoniae (2)  Antibiotic Interpretation Microscan    amoxicillin-clavulanate Sensitive <=8/4 mcg/mL   ampicillin Resistant >16 mcg/mL   ampicillin-sulbactam Sensitive <=8/4 mcg/mL   ceFAZolin Sensitive <=2 mcg/mL   cefepime Sensitive <=2 mcg/mL   cefTRIAXone Sensitive <=1 mcg/mL   cefuroxime Intermediate 16 mcg/mL   ciprofloxacin Sensitive <=1 mcg/mL   ertapenem Sensitive <=0.5 mcg/mL   gentamicin Sensitive <=4 mcg/mL   meropenem Sensitive <=1 mcg/mL   nitrofurantoin Resistant >64 mcg/mL   piperacillin-tazobactam Sensitive <=16 mcg/mL   trimethoprim-sulfamethoxazole Sensitive <=2/38 mcg/mL     Escherichia coli (1)  Antibiotic Interpretation Microscan    ampicillin Sensitive <=8 mcg/mL   ampicillin-sulbactam Sensitive <=8/4 mcg/mL   ceFAZolin Sensitive <=2 mcg/mL   cefepime Sensitive <=2 mcg/mL   cefTRIAXone Sensitive <=1 mcg/mL   cefuroxime Sensitive 8 mcg/mL   ciprofloxacin Sensitive <=1 mcg/mL   ertapenem Sensitive <=0.5 mcg/mL   gentamicin Sensitive <=4 mcg/mL   meropenem Sensitive <=1 mcg/mL   nitrofurantoin Sensitive <=32 mcg/mL   piperacillin-tazobactam Sensitive <=16 mcg/mL   trimethoprim-sulfamethoxazole Sensitive <=2/38 mcg/mL      BC x 2 no growth to date    IMAGIN/9 Lumbar MRI w/wo contrast  Discitis and osteomyelitis at T9-10 along with a small epidural abscess/phlegmon and paraspinal inflammatory change     Thoracic MRI w/wo contrast  Discitis and osteomyelitis at T9-10 along with a small epidural abscess/phlegmon and paraspinal inflammatory       Scheduled Meds:   ceFAZolin  2,000 mg IntraVENous Q8H    lactulose  10 g Oral Daily    rifAXIMin  550 mg Oral BID    miconazole   Topical BID    lidocaine  1 patch TransDERmal Daily    metoprolol succinate  25 mg Oral Daily    CENTRUM/CERTA-MARJAN with minerals oral  15 mL Oral Daily    vitamin D  5,000 Units Oral Daily    sodium chloride flush  5-40 mL IntraVENous 2 times per day    enoxaparin  40 mg SubCUTAneous Daily    pantoprazole  40 mg IntraVENous BID    insulin lispro  0-12 Units SubCUTAneous TID     insulin lispro  0-6 Units SubCUTAneous Nightly       Continuous Infusions:   sodium chloride 30 mL (05/09/22 1501)    dextrose         PRN Meds:  calcium carbonate, oxyCODONE, ipratropium-albuterol, sodium chloride flush, sodium chloride, ondansetron **OR** ondansetron, polyethylene glycol, glucagon (rDNA), dextrose, dextrose bolus, glucose      Assessment:     Hx DM, cirrhosis  Hx severe COVID-19 pneumonia with resp failure, trach  Lives at nursing facility    Encephalopathy     MSSA bacteremia  T9/10 osteo-diskitis - reviewed MRI 5/9, clear evidence of infection    UTI vs bacteriuria - urine cult - E coli, Klebsiella (both S cefazolin)    Plan:     Change to ancef  F/u BC - no growth to date    PICC - ok to place   See SAVANNAH    Medical Decision Making:   The following items were considered in medical decision making:  Discussion of patient care with other providers  Reviewed clinical lab tests  Reviewed radiology tests  Reviewed other diagnostic tests/interventions  Independent review of radiologic images - will reviewed MRI with Radiologist 5/9  Microbiology cultures and other micro tests reviewed      Discussed with pt  Prosper Mi MD    INFUSION ORDERS:  Ancef 2 gm iv q 8 hr through 6/22  - Diagnosis - S aureus bacteremia, thoracic diskitis / vertebral osrteomyelitis  - First dose given in hospital  - PICC   - Disposition / date discharge  - Check CBC w diff, CMP, ESR, CRP every Mon or [address] result to [phone]  - Call with antibiotic / infusion issues, [phone]  - Call with any change in status, transfer in or out of a facility or to hospital - [phone]  - No f/u in outpatient ID office necessary

## 2022-05-10 NOTE — PLAN OF CARE
Problem: Discharge Planning  Goal: Discharge to home or other facility with appropriate resources  Outcome: Progressing     Problem: Skin/Tissue Integrity  Goal: Absence of new skin breakdown  Description: 1. Monitor for areas of redness and/or skin breakdown  2. Assess vascular access sites hourly  3. Every 4-6 hours minimum:  Change oxygen saturation probe site  4. Every 4-6 hours:  If on nasal continuous positive airway pressure, respiratory therapy assess nares and determine need for appliance change or resting period.   Outcome: Progressing     Problem: Safety - Adult  Goal: Free from fall injury  Outcome: Progressing     Problem: ABCDS Injury Assessment  Goal: Absence of physical injury  Outcome: Progressing     Problem: Chronic Conditions and Co-morbidities  Goal: Patient's chronic conditions and co-morbidity symptoms are monitored and maintained or improved  Outcome: Progressing     Problem: Pain  Goal: Verbalizes/displays adequate comfort level or baseline comfort level  Outcome: Progressing

## 2022-05-10 NOTE — CARE COORDINATION
CM cont to follow for  D/C planning:      Patient  Needs  SNF  Placement  And  LT IV atbx : She cannot return to [facility]  At this time. Attempted to met with  Pt  To follow but  In  Procedure  Having PICC line  Placed at bedside  . CM will make  Local referrals  As this  CM  Worked  With her  On previous  admission and  Pt  preference  Was  Something in the  [place] area. Pending - Request Sent      [address] [facility] Pending        Yana Dickerson with  [facility] admissions  Ran  Benefits  And she is  In her  Co pay days :  She will run  Check to see if  eligible  For  Medicaid  , may need  To  Contact  Public  [place]  If  Medicaid is needed/  And  If Cannot  Pay  copay to return to SNF  . No Secondary insurnace  Listed. Electronically signed by Boyd Severe, RN on 5/10/2022 at 2:58 PM          Boyd Severe  RN Case Manager  [facility]  [address] [place] [address].   [place] [address]  [phone]  Fax [phone]

## 2022-05-10 NOTE — PROGRESS NOTES
Hospitalist Progress Note      PCP: No primary care provider on file. Date of Admission: 5/5/2022    Chief Complaint: [address] Course: 59 y.o. female with a past medical history of alcoholic cirrhosis, COVID-pneumonia complicated by respiratory failure status post tracheostomy (no vent requirement), diabetes, who presents from her living facility with altered mental status and possible ingestion.  Patient currently resides in a nursing home [facility]) and was noted to be confused this morning upon awakening.  Squad also states that patient's nursing home reported a visitor around 3 AM, with concern for administering Tylenol PM.  Patient admits to taking 2 these, but denies any other ingestions today.  Patient reporting pain in her back as well as in her abdomen, she has no shortness of breath or chest pain.  No fevers that she is reporting.     Nursing facility reports she has some visitor that frequently visits her and everytime after the visitor comes she would become very lethargic and drowsy. It was reported that she was visited by the visitors again and was given narcan afterwards. But prior UDS neg for opioids. Subjective: No acute events ported overnight. Patient continues to complain of back pain but otherwise denies any other new complaints.     Medications:  Reviewed    Infusion Medications    sodium chloride      sodium chloride 30 mL (05/09/22 1501)    dextrose       Scheduled Medications    lidocaine 1 % injection  5 mL IntraDERmal Once    sodium chloride flush  5-40 mL IntraVENous 2 times per day    magnesium sulfate  6,000 mg IntraVENous Once    potassium chloride  40 mEq Oral Once    ceFAZolin  2,000 mg IntraVENous Q8H    lactulose  10 g Oral Daily    rifAXIMin  550 mg Oral BID    miconazole   Topical BID    lidocaine  1 patch TransDERmal Daily    metoprolol succinate  25 mg Oral Daily    CENTRUM/CERTA-MARJAN with minerals oral  15 mL Oral Daily    vitamin D 5,000 Units Oral Daily    sodium chloride flush  5-40 mL IntraVENous 2 times per day    enoxaparin  40 mg SubCUTAneous Daily    pantoprazole  40 mg IntraVENous BID    insulin lispro  0-12 Units SubCUTAneous TID WC    insulin lispro  0-6 Units SubCUTAneous Nightly     PRN Meds: sodium chloride flush, sodium chloride, calcium carbonate, oxyCODONE, ipratropium-albuterol, sodium chloride flush, sodium chloride, ondansetron **OR** ondansetron, polyethylene glycol, glucagon (rDNA), dextrose, dextrose bolus, glucose    No intake or output data in the 24 hours ending 05/10/22 1204    Physical Exam Performed:    /68   Pulse 68   Temp 98.5 °F (36.9 °C) (Oral)   Resp 18   Ht 5' 6\" (1.676 m)   Wt 199 lb 8 oz (90.5 kg)   LMP 03/31/1982   SpO2 97%   BMI 32.20 kg/m²     General appearance: Alert and awake, mild distress due to back pain  HEENT: Pupils equal, round, and reactive to light. Conjunctivae/corneas clear. Neck: Supple, with full range of motion. No jugular venous distention. Trachea midline. Respiratory:  Normal respiratory effort. Clear to auscultation, bilaterally without Rales/Wheezes/Rhonchi. Cardiovascular: Regular rate and rhythm with normal S1/S2 without murmurs, rubs or gallops. Abdomen: Soft, non-tender, non-distended with normal bowel sounds. Musculoskeletal: No clubbing, cyanosis or edema bilaterally. Full range of motion without deformity. Skin: Skin color, texture, turgor normal.  No rashes or lesions. Neurologic:  Neurovascularly intact without any focal sensory/motor deficits.  Cranial nerves: II-XII intact, grossly non-focal.  Psychiatric: less confused today  Capillary Refill: Brisk,3 seconds, normal   Peripheral Pulses: +2 palpable, equal bilaterally       Labs:   Recent Labs     05/08/22  0818 05/10/22  1000   WBC 4.1 4.3   HGB 11.5* 12.6   HCT 34.9* 40.2   * 94*     Recent Labs     05/08/22  0818 05/10/22  1000    137   K 3.4* 3.3*    100   CO2 27 24   BUN 22* 9   CREATININE 1.2* 0.7   CALCIUM 10.0 10.1     Recent Labs     05/08/22  0818 05/10/22  1000   AST 17 19   ALT 11 9*   BILITOT 1.4* 1.6*   ALKPHOS 124 129     No results for input(s): INR in the last 72 hours. No results for input(s): [place] in the last 72 hours. Urinalysis:      Lab Results   Component Value Date    NITRU POSITIVE 05/05/2022    WBCUA 3-5 05/05/2022    BACTERIA 4+ 05/05/2022    RBCUA None seen 05/05/2022    BLOODU Negative 05/05/2022    SPECGRAV 1.010 05/05/2022    GLUCOSEU Negative 05/05/2022       Radiology:  MRI THORACIC SPINE W WO CONTRAST   Final Result      Discitis and osteomyelitis at T9-10 along with a small epidural abscess/phlegmon and paraspinal inflammatory change. MRI LUMBAR SPINE W WO CONTRAST   Final Result      Discitis and osteomyelitis at T9-10 along with a small epidural abscess/phlegmon and paraspinal inflammatory change. US ABDOMEN LIMITED   Final Result      No ascites identified               CT HEAD WO CONTRAST   Final Result      No acute intracranial pathology                 XR CHEST PORTABLE   Final Result      No acute cardiopulmonary disease.                  Assessment/Plan:    Active Hospital Problems    Diagnosis     Acute metabolic encephalopathy [H06.78]      Priority: Medium    Hyperammonemia ([address] [E72.20]      Priority: Medium    Hyponatremia [E87.1]      Priority: Medium    Azotemia [R79.89]      Priority: Medium    MALENA (acute kidney injury) ([address] [N17.9]      Priority: Medium    Altered mental status [R41.82]      Priority: Medium     Acute metabolic encephalopathy likely 2/2 hepatic encephalopathy  Alcoholic cirrhosis  - HCV abx negative (1/13/22), HBV negative (not vaccinated)  - Continue lactulose and rifaximin     Back pain due to OM/Discitis T9-T10 with epidural abscess  MSSA bacteremia  - ID following  - Echo was negative for vegetations  - Continue Ancef 2 gm iv q 8 hr through 6/22  - Orders placed for PICC line placement    Hypomagnesemia/hypokalemia  - Replace and recheck    Acute kidney injury  - Likely prerenal, improved  -Continue to hold spironolactone and torsemide at discharge as per nephrology    PUD  - March 2022 EGD showed 3 clean based antral ulcers and duodenal ulcers  -Continue Protonix twice daily on discharge     DMII with neuropathy  - sliding scale     Chronic hypoxic resp 2/2 COVID PNA s/p trach  Chronic COPD not in exacerbation  - Hx of Staph aureus, Serratia, Streno per bronchs in 11/2021 - 12/2021  - has chronic trach  - COPD not in exacerbation  - duoneb PRN     Chronic Diastolic CHF  -Compensated  - TTE 1/2022 showed normal EF     Bipolar  -On Seroquel     DVT Prophylaxis: heparin  Diet: ADULT DIET; Regular  Code Status: Full Code    PT/OT Eval Status: Ongoing    Dispo -stable for discharge.   Await PICC line placement    Jose Eduardo Chaves MD

## 2022-05-10 NOTE — PROGRESS NOTES
Occupational Therapy  Facility/Department: [address]  Occupational Therapy Initial Assessment/tx    Name: Yehuda Agarwal  : 1965  MRN: [id number]  Date of Service: 5/10/2022    Discharge Recommendations:  NITA ALLEN Rehfuabbi scored a 13/24 on the AM-PAC ADL Inpatient form. Current research shows that an AM-PAC score of 17 or less is typically not associated with a discharge to the patient's home setting. Based on the patient's AM-PAC score and their current ADL deficits, it is recommended that the patient have 3-5 sessions per week of Occupational Therapy at d/c to increase the patient's independence. Please see assessment section for further patient specific details. If patient discharges prior to next session this note will serve as a discharge summary. Please see below for the latest assessment towards goals. OT Equipment Recommendations  Other: defer to next level of care       Patient Diagnosis(es): The primary encounter diagnosis was Altered mental status, unspecified altered mental status type. Diagnoses of MALENA (acute kidney injury) ([facility] and Urinary tract infection without hematuria, site unspecified were also pertinent to this visit. Past Medical History:  has a past medical history of CHF (congestive heart failure) ([facility] Hyperlipidemia, Obesity, and Tracheostomy in place [facility]). Past Surgical History:  has a past surgical history that includes Upper gastrointestinal endoscopy (N/A, 3/31/2022). Treatment Diagnosis: impaired ADLs and mobility      Assessment   Performance deficits / Impairments: Decreased functional mobility ; Decreased ADL status; Decreased endurance;Decreased cognition  Assessment: Pt admitted from SNF with acute metabolic encephalopathy. Because of pain in her trunk, she was only able to participate in bed tasks. Currently, pt needs SBA with rolling, Jemima supine to sit, dep with toileting. Pt has cognitive deficits with impaired memory and attention to tasks. Toward end of session, pt tearful and stated she did not know if she was going to get out of hospital.  OT provided emotional suppport and informed RN. Recommend ongoing inpatient OT at discharge to maximize functional status. Treatment Diagnosis: impaired ADLs and mobility  Prognosis: Good  Decision Making: High Complexity  REQUIRES OT FOLLOW-UP: Yes  Activity Tolerance  Activity Tolerance: Patient limited by pain        Plan   Plan  Times per Week: 2-5  Current Treatment Recommendations: Endurance training,Functional mobility training,Self-Care / ADL,Safety education & training     Restrictions  Position Activity Restriction  Other position/activity restrictions: Up with assist    Subjective   General  Chart Reviewed: Yes  Additional Pertinent Hx: PMH:  alcoholic cirrhosis, COVID pneumonia, respiratory failure s/p trach, diabetes, bipolar  Family / Caregiver Present: No  Referring Practitioner: Dr. Antonella Ramey  Diagnosis: Pt admitted with AMS, dx of acute metabolic encephalopathy secondary to hepatic encephalopathy, MALENA, UTI-CXR=neg, head CT=neg, abd US=no ascities, MRI thoracic and lumbar spine=discitis and osteomyelitis T9-10, small epidural abscess, diffuse disc buldge L3-4  Subjective  Subjective: Pt in bed, reporting she cannot move, has pain around trunk, and needs to use the bathroom. Social/Functional History  Social/Functional History  Type of Home: Facility ([address])  Additional Comments: Pt poor historian and reports not getting out of bed at facility due to pain. Pt does say she was using a walker when she could participate in therapy. Pt's ultimate goal is to return home. Pt lives alone. Objective                Safety Devices  Type of Devices: Left in bed;Bed alarm in place;Call light within reach;Nurse notified           ADL  Feeding: Independent; Beverage management  Toileting: Dependent/Total (use of bed pan, dep with toileting hygiene)        Bed mobility  Bridging: Stand by assistance (minimal clearance of buttocks)  Rolling to Left: Stand by assistance (cues, use of bed rail, increased time/effort)  Rolling to Right: Stand by assistance (cues, use of bed rail, increased time/effort)  Supine to Sit:  (head of bed elevated, use of bed rail, increased time/effort-once patient, immediately layed back down secondary to pain)  Sit to Supine: Stand by assistance  Transfers  Transfer Comments: unable to assess secondary to trunk pain     Cognition  Overall Cognitive Status: Exceptions  Following Commands: Follows one step commands with increased time  Attention Span: Difficulty attending to directions; Attends with cues to redirect  Memory: Decreased short term memory  Safety Judgement: Decreased awareness of need for assistance  Problem Solving: Assistance required to generate solutions;Decreased awareness of errors  Initiation: Requires cues for all  Sequencing: Requires cues for some  Cognition Comment: Pt in alot of pain, difficulty concentrating on tasks at hand.                      LUE AROM (degrees)  LUE AROM : WFL  LUE General AROM: in supine  Left Hand AROM (degrees)  Left Hand AROM: WFL  RUE AROM (degrees)  RUE General AROM: in supine  Right Hand AROM (degrees)  Right Hand AROM: WFL        Hand Dominance  Hand Dominance: Right            G-Code     OutComes Score                                                  -PAC Score        AM-[facility] Inpatient Daily Activity Raw Score: 13 (05/10/22 0955)  AM-PAC Inpatient ADL T-Scale Score : 32.03 (05/10/22 0955)  ADL Inpatient CMS 0-100% Score: 63.03 (05/10/22 0955)  ADL Inpatient CMS G-Code Modifier : CL (05/10/22 1536)    Goals  Short Term Goals  Time Frame for Short term goals: discharge  Short Term Goal 1: pt to do grooming/hygiene tasks indep, after set up  Short Term Goal 2: pt to roll indep  Short Term Goal 3: pt to move supine><sit with SBA/S  Short Term Goal 4: pt to tolerate sitting on side of be for 1-2 minutes with SBA, to increase ADLs  Short Term Goal 5: pt to participate in transfer assessment to bedside commode  Patient Goals   Patient goals : to get out of bed, walk       Therapy Time   Individual Concurrent Group Co-treatment   Time In 0848         Time Out 0931         Minutes 43              Timed Code Treatment Minutes:   28    Total Treatment Minutes:  [address] [facility], OTR/L [phone]

## 2022-05-11 LAB
A/G RATIO: 0.9 (ref 1.1–2.2)
AFP: 1.5 UG/L
ALBUMIN SERPL-MCNC: 3.7 G/DL (ref 3.4–5)
ALP BLD-CCNC: 125 U/L (ref 40–129)
ALT SERPL-CCNC: 7 U/L (ref 10–40)
ANION GAP SERPL CALCULATED.3IONS-SCNC: 11 MMOL/L (ref 3–16)
AST SERPL-CCNC: 18 U/L (ref 15–37)
BASOPHILS ABSOLUTE: 0 K/UL (ref 0–0.2)
BASOPHILS RELATIVE PERCENT: 0.3 %
BILIRUB SERPL-MCNC: 1.4 MG/DL (ref 0–1)
BLOOD CULTURE, ROUTINE: NORMAL
BUN BLDV-MCNC: 7 MG/DL (ref 7–20)
CALCIUM SERPL-MCNC: 9.7 MG/DL (ref 8.3–10.6)
CHLORIDE BLD-SCNC: 100 MMOL/L (ref 99–110)
CO2: 25 MMOL/L (ref 21–32)
CREAT SERPL-MCNC: 0.6 MG/DL (ref 0.6–1.1)
CULTURE, BLOOD 2: NORMAL
EOSINOPHILS ABSOLUTE: 0.2 K/UL (ref 0–0.6)
EOSINOPHILS RELATIVE PERCENT: 4.7 %
GFR AFRICAN AMERICAN: >60
GFR NON-AFRICAN AMERICAN: >60
GLUCOSE BLD-MCNC: 101 MG/DL (ref 70–99)
GLUCOSE BLD-MCNC: 103 MG/DL (ref 70–99)
GLUCOSE BLD-MCNC: 111 MG/DL (ref 70–99)
GLUCOSE BLD-MCNC: 128 MG/DL (ref 70–99)
HCT VFR BLD CALC: 33.5 % (ref 36–48)
HEMOGLOBIN: 11.1 G/DL (ref 12–16)
LYMPHOCYTES ABSOLUTE: 1 K/UL (ref 1–5.1)
LYMPHOCYTES RELATIVE PERCENT: 23.8 %
MAGNESIUM: 2.1 MG/DL (ref 1.8–2.4)
MCH RBC QN AUTO: 28.4 PG (ref 26–34)
MCHC RBC AUTO-ENTMCNC: 33 G/DL (ref 31–36)
MCV RBC AUTO: 86.1 FL (ref 80–100)
MONOCYTES ABSOLUTE: 0.4 K/UL (ref 0–1.3)
MONOCYTES RELATIVE PERCENT: 9.8 %
NEUTROPHILS ABSOLUTE: 2.6 K/UL (ref 1.7–7.7)
NEUTROPHILS RELATIVE PERCENT: 61.4 %
PDW BLD-RTO: 15 % (ref 12.4–15.4)
PERFORMED ON: ABNORMAL
PLATELET # BLD: 112 K/UL (ref 135–450)
PMV BLD AUTO: 7.3 FL (ref 5–10.5)
POTASSIUM REFLEX MAGNESIUM: 3.3 MMOL/L (ref 3.5–5.1)
RBC # BLD: 3.89 M/UL (ref 4–5.2)
SODIUM BLD-SCNC: 136 MMOL/L (ref 136–145)
TOTAL PROTEIN: 7.6 G/DL (ref 6.4–8.2)
WBC # BLD: 4.2 K/UL (ref 4–11)

## 2022-05-11 PROCEDURE — 99233 SBSQ HOSP IP/OBS HIGH 50: CPT | Performed by: INTERNAL MEDICINE

## 2022-05-11 PROCEDURE — 99232 SBSQ HOSP IP/OBS MODERATE 35: CPT | Performed by: INTERNAL MEDICINE

## 2022-05-11 PROCEDURE — 83735 ASSAY OF MAGNESIUM: CPT

## 2022-05-11 PROCEDURE — 6360000002 HC RX W HCPCS: Performed by: INTERNAL MEDICINE

## 2022-05-11 PROCEDURE — 1200000000 HC SEMI PRIVATE

## 2022-05-11 PROCEDURE — 6370000000 HC RX 637 (ALT 250 FOR IP): Performed by: INTERNAL MEDICINE

## 2022-05-11 PROCEDURE — 85025 COMPLETE CBC W/AUTO DIFF WBC: CPT

## 2022-05-11 PROCEDURE — 80053 COMPREHEN METABOLIC PANEL: CPT

## 2022-05-11 PROCEDURE — 2580000003 HC RX 258: Performed by: INTERNAL MEDICINE

## 2022-05-11 PROCEDURE — C9113 INJ PANTOPRAZOLE SODIUM, VIA: HCPCS | Performed by: INTERNAL MEDICINE

## 2022-05-11 RX ORDER — POTASSIUM CHLORIDE 20 MEQ/1
40 TABLET, EXTENDED RELEASE ORAL ONCE
Status: COMPLETED | OUTPATIENT
Start: 2022-05-11 | End: 2022-05-11

## 2022-05-11 RX ADMIN — OXYCODONE 10 MG: 5 TABLET ORAL at 01:49

## 2022-05-11 RX ADMIN — CEFAZOLIN SODIUM 2000 MG: 10 INJECTION, POWDER, FOR SOLUTION INTRAVENOUS at 23:37

## 2022-05-11 RX ADMIN — CEFAZOLIN SODIUM 2000 MG: 10 INJECTION, POWDER, FOR SOLUTION INTRAVENOUS at 15:06

## 2022-05-11 RX ADMIN — Medication 5000 UNITS: at 09:28

## 2022-05-11 RX ADMIN — MICONAZOLE NITRATE: 20 CREAM TOPICAL at 11:19

## 2022-05-11 RX ADMIN — SODIUM CHLORIDE, PRESERVATIVE FREE 10 ML: 5 INJECTION INTRAVENOUS at 09:47

## 2022-05-11 RX ADMIN — PANTOPRAZOLE SODIUM 40 MG: 40 INJECTION, POWDER, FOR SOLUTION INTRAVENOUS at 20:23

## 2022-05-11 RX ADMIN — Medication 15 ML: at 10:01

## 2022-05-11 RX ADMIN — OXYCODONE 10 MG: 5 TABLET ORAL at 23:23

## 2022-05-11 RX ADMIN — POTASSIUM CHLORIDE 40 MEQ: 1500 TABLET, EXTENDED RELEASE ORAL at 23:24

## 2022-05-11 RX ADMIN — LACTULOSE 10 G: 20 SOLUTION ORAL at 09:32

## 2022-05-11 RX ADMIN — OXYCODONE 10 MG: 5 TABLET ORAL at 09:33

## 2022-05-11 RX ADMIN — ONDANSETRON 4 MG: 2 INJECTION INTRAMUSCULAR; INTRAVENOUS at 15:05

## 2022-05-11 RX ADMIN — PANTOPRAZOLE SODIUM 40 MG: 40 INJECTION, POWDER, FOR SOLUTION INTRAVENOUS at 09:34

## 2022-05-11 RX ADMIN — RIFAXIMIN 550 MG: 550 TABLET ORAL at 20:23

## 2022-05-11 RX ADMIN — OXYCODONE 10 MG: 5 TABLET ORAL at 19:02

## 2022-05-11 RX ADMIN — MICONAZOLE NITRATE: 20 CREAM TOPICAL at 20:24

## 2022-05-11 RX ADMIN — POTASSIUM CHLORIDE 40 MEQ: 1500 TABLET, EXTENDED RELEASE ORAL at 09:32

## 2022-05-11 RX ADMIN — SODIUM CHLORIDE, PRESERVATIVE FREE 10 ML: 5 INJECTION INTRAVENOUS at 11:18

## 2022-05-11 RX ADMIN — SODIUM CHLORIDE, PRESERVATIVE FREE 10 ML: 5 INJECTION INTRAVENOUS at 20:23

## 2022-05-11 RX ADMIN — OXYCODONE 10 MG: 5 TABLET ORAL at 15:04

## 2022-05-11 RX ADMIN — CEFAZOLIN SODIUM 2000 MG: 10 INJECTION, POWDER, FOR SOLUTION INTRAVENOUS at 05:26

## 2022-05-11 RX ADMIN — RIFAXIMIN 550 MG: 550 TABLET ORAL at 09:28

## 2022-05-11 ASSESSMENT — PAIN DESCRIPTION - DESCRIPTORS
DESCRIPTORS: PRESSURE;STABBING
DESCRIPTORS: PRESSURE;STABBING

## 2022-05-11 ASSESSMENT — PAIN DESCRIPTION - PAIN TYPE: TYPE: ACUTE PAIN

## 2022-05-11 ASSESSMENT — PAIN DESCRIPTION - LOCATION
LOCATION: BACK;ABDOMEN
LOCATION: BACK
LOCATION: BACK;ABDOMEN
LOCATION: BACK

## 2022-05-11 ASSESSMENT — PAIN SCALES - GENERAL
PAINLEVEL_OUTOF10: 8
PAINLEVEL_OUTOF10: 3
PAINLEVEL_OUTOF10: 9
PAINLEVEL_OUTOF10: 8
PAINLEVEL_OUTOF10: 8
PAINLEVEL_OUTOF10: 9
PAINLEVEL_OUTOF10: 8
PAINLEVEL_OUTOF10: 8
PAINLEVEL_OUTOF10: 3

## 2022-05-11 ASSESSMENT — PAIN DESCRIPTION - ORIENTATION
ORIENTATION: LEFT
ORIENTATION: LEFT

## 2022-05-11 NOTE — PROGRESS NOTES
Nephrology  Note                                                                                                                                                                                                                                                                                                                                                               Office : [phone]     Fax :[phone]              Patient's Name: NITA Etienne    Good uO   Cr better   No N/V/D   C/o pain         Past Medical History:   Diagnosis Date    CHF (congestive heart failure) ([address]     Hyperlipidemia     Obesity     Tracheostomy in place [facility])        Past Surgical History:   Procedure Laterality Date    UPPER GASTROINTESTINAL ENDOSCOPY N/A 3/31/2022    EGD BIOPSY performed by Thierno Tierney MD at [facility] reviewed. No pertinent family history. reports that she has quit smoking. She has never used smokeless tobacco. She reports previous alcohol use. She reports that she does not use drugs. Allergies:  Patient has no known allergies.     Current Medications:    sodium chloride flush 0.9 % injection 5-40 mL, 2 times per day  sodium chloride flush 0.9 % injection 5-40 mL, PRN  0.9 % sodium chloride infusion, PRN  ceFAZolin (ANCEF) 2000 mg in dextrose 5 % 50 mL IVPB, Q8H  calcium carbonate (TUMS) chewable tablet 500 mg, TID PRN  oxyCODONE (ROXICODONE) immediate release tablet 10 mg, Q4H PRN  lactulose (CHRONULAC) 10 GM/15ML solution 10 g, Daily  rifAXIMin (XIFAXAN) tablet 550 mg, BID  miconazole (MICOTIN) 2 % cream, BID  lidocaine 4 % external patch 1 patch, Daily  ipratropium-albuterol (DUONEB) nebulizer solution 3 mL, Q4H PRN  metoprolol succinate (TOPROL XL) extended release tablet 25 mg, Daily  CENTRUM/CERTA-MARJAN with minerals oral solution 15 mL, Daily  vitamin D (CHOLECALCIFEROL) capsule 5,000 Units, Daily  sodium chloride flush 0.9 % injection 5-40 mL, 2 times per day  sodium chloride flush 0.9 % injection 5-40 mL, PRN  0.9 % sodium chloride infusion, PRN  enoxaparin (LOVENOX) injection 40 mg, Daily  ondansetron (ZOFRAN-ODT) disintegrating tablet 4 mg, Q8H PRN   Or  ondansetron (ZOFRAN) injection 4 mg, Q6H PRN  polyethylene glycol (GLYCOLAX) packet 17 g, Daily PRN  pantoprazole (PROTONIX) injection 40 mg, BID  insulin lispro (1 Unit Dial) 0-12 Units, TID WC  insulin lispro (1 Unit Dial) 0-6 Units, Nightly  glucagon (rDNA) injection 1 mg, PRN  dextrose 5 % solution, PRN  dextrose bolus 10% 250 mL, PRN  glucose chewable tablet 16 g, PRN        Review of Systems:   14 point ROS obtained but were negative except mentioned in HPI      Physical exam:     Vitals:  /71   Pulse 61   Temp 97.8 °F (36.6 °C) (Oral)   Resp 19   Ht 5' 6\" (1.676 m)   Wt 199 lb 8 oz (90.5 kg)   LMP 03/31/1982   SpO2 94%   BMI 32.20 kg/m²   Constitutional:  OAA X3 NAD  Skin: no rash, turgor wnl  Heent:  eomi, mmm  Neck: no bruits or jvd noted  Cardiovascular:  S1, S2 without m/r/g  Respiratory: dec BS   Abdomen:  +bs, soft, nt, nd  Ext: ++ lower extremity edema  Psychiatric: mood and affect flat   Musculoskeletal:  Rom, muscular strength intact    Data:   Labs:  CBC:   Recent Labs     05/08/22  0818 05/10/22  1000   WBC 4.1 4.3   HGB 11.5* 12.6   * 94*     BMP:    Recent Labs     05/08/22 0818 05/10/22  1000    137   K 3.4* 3.3*    100   CO2 27 24   BUN 22* 9   CREATININE 1.2* 0.7   GLUCOSE 158* 101*     Ca/Mg/Phos:   Recent Labs     05/08/22  0818 05/10/22  1000   CALCIUM 10.0 10.1   MG 1.50* 1.30*     Hepatic:   Recent Labs     05/08/22 0818 05/10/22  1000   AST 17 19   ALT 11 9*   BILITOT 1.4* 1.6*   ALKPHOS 124 129     Troponin: No results for input(s): TROPONINI in the last 72 hours. BNP: No results for input(s): BNP in the last 72 hours. Lipids: No results for input(s): CHOL, TRIG, HDL, LDLCALC, LABVLDL in the last 72 hours.   ABGs: No results for input(s): PHART, PO2ART, YLL6FIA in the last 72 hours. INR:   No results for input(s): INR in the last 72 hours. UA:  No results for input(s): Rafaela Pen, GLUCOSEU, BILIRUBINUR, KETUA, SPECGRAV, BLOODU, PHUR, PROTEINU, UROBILINOGEN, NITRU, LEUKOCYTESUR, Benay Purpura in the last 72 hours. Urine Microscopic:   No results for input(s): LABCAST, BACTERIA, COMU, HYALCAST, WBCUA, RBCUA, EPIU in the last 72 hours. Urine Culture:   No results for input(s): LABURIN in the last 72 hours. Urine Chemistry: No results for input(s): Kathya Antoine, PROTEINUR, NAUR in the last 72 hours. IMAGING:  MRI THORACIC SPINE W WO CONTRAST   Final Result      Discitis and osteomyelitis at T9-10 along with a small epidural abscess/phlegmon and paraspinal inflammatory change. MRI LUMBAR SPINE W WO CONTRAST   Final Result      Discitis and osteomyelitis at T9-10 along with a small epidural abscess/phlegmon and paraspinal inflammatory change. US ABDOMEN LIMITED   Final Result      No ascites identified               CT HEAD WO CONTRAST   Final Result      No acute intracranial pathology                 XR CHEST PORTABLE   Final Result      No acute cardiopulmonary disease. Assessment/Plan   1. AMS     2. Alc cirrhosis     3. Anemia    4. Acid- base/ Electrolyte imbalance     5.  MALENA     Plan   - off octreotide + Midodrine   - Hold diuretics    - replace K  - Ur studies - reviewed   - Monitor UO and renal function   - labs in am   - Chronic hypoxic resp 2/2 COVID PNA s/p trach    Recommend to dose adjust all medications  based on renal functions  Maintain SBP> 90 mmHg   Daily weights   AVOID NSAIDs  Avoid Nephrotoxins  Monitor Intake/Output  Call if significant decrease in urine output                 Thank you for allowing us to participate in care of Kasia Schmid MD  Feel free to contact me   [facility] [address]  Office : [phone]  Fax :[phone]

## 2022-05-11 NOTE — CARE COORDINATION
[facility] [address] due to positive drug screen    [facility] - can accept clinically; EN called Remi Rock in [place] who will call pt to discuss applying for Medicaid      CM met with pt at bedside to update about DC plan. Pt in agreement with SNF, possibly LTC. CM informed her that Remi Rock in [place] will be calling her to discuss Medicaid.         Electronically signed by YOLA Nayak, ZEBW on 5/11/2022 at 10:47 AM

## 2022-05-11 NOTE — PROGRESS NOTES
ID Follow-up NOTE    CC:   Staphylococcus aureus bacteremia, Thoracic osteo-diskitis  Antibiotics Ancef    Admit Date: 5/5/2022  Hospital Day: 7    Subjective:     Patient c/o LBP, 'middle' of back, no radiation - only wants to lie flat due to pain  No urinary complaints    Objective:     Patient Vitals for the past 8 hrs:   BP Temp Temp src Pulse Resp SpO2   05/11/22 1148 109/70 98.4 °F (36.9 °C) Oral 56 16 98 %   05/11/22 1005 -- -- -- 55 -- --   05/11/22 0817 -- -- -- 60 -- --     I/O last 3 completed shifts: In: 10 [I.V.:10]  Out: -   I/O this shift:  In: 20 [I.V.:20]  Out: -     EXAM:  GENERAL: No apparent distress.   RA  HEENT: Membranes moist, no oral lesion  NECK:  Supple, no lymphadenopathy  LUNGS: Clear b/l, no rales, no dullness  CARDIAC: RRR, no murmur appreciated  ABD:  + BS, soft / NT  EXT:  No rash, no edema, no lesions  NEURO: No focal neurologic findings  PSYCH: Orientation, sensorium, mood normal  LINES:  Peripheral iv       Data Review:  Lab Results   Component Value Date    WBC 4.2 05/11/2022    HGB 11.1 (L) 05/11/2022    HCT 33.5 (L) 05/11/2022    MCV 86.1 05/11/2022     (L) 05/11/2022     Lab Results   Component Value Date    CREATININE 0.6 05/11/2022    BUN 7 05/11/2022     05/11/2022    K 3.3 (L) 05/11/2022     05/11/2022    CO2 25 05/11/2022       Hepatic Function Panel:   Lab Results   Component Value Date    ALKPHOS 125 05/11/2022    ALT 7 05/11/2022    AST 18 05/11/2022    PROT 7.6 05/11/2022    BILITOT 1.4 05/11/2022    BILIDIR 0.3 05/05/2022    IBILI 1.2 05/05/2022    LABALBU 3.7 05/11/2022       MICRO:  5/5 BC x 1 S aureus   Antibiotic Interpretation Microscan    clindamycin Sensitive <=0.25 mcg/mL   erythromycin Resistant >=8 mcg/mL   oxacillin Sensitive <=0.25 mcg/mL   tetracycline Sensitive <=1 mcg/mL   trimethoprim-sulfamethoxazole Sensitive <=10 mcg/mL     5/5 Urine cult >100k E coli, K pneumoniae  Klebsiella pneumoniae (2)  Antibiotic Interpretation Microscan amoxicillin-clavulanate Sensitive <=8/4 mcg/mL   ampicillin Resistant >16 mcg/mL   ampicillin-sulbactam Sensitive <=8/4 mcg/mL   ceFAZolin Sensitive <=2 mcg/mL   cefepime Sensitive <=2 mcg/mL   cefTRIAXone Sensitive <=1 mcg/mL   cefuroxime Intermediate 16 mcg/mL   ciprofloxacin Sensitive <=1 mcg/mL   ertapenem Sensitive <=0.5 mcg/mL   gentamicin Sensitive <=4 mcg/mL   meropenem Sensitive <=1 mcg/mL   nitrofurantoin Resistant >64 mcg/mL   piperacillin-tazobactam Sensitive <=16 mcg/mL   trimethoprim-sulfamethoxazole Sensitive <=2/38 mcg/mL     Escherichia coli (1)  Antibiotic Interpretation Microscan    ampicillin Sensitive <=8 mcg/mL   ampicillin-sulbactam Sensitive <=8/4 mcg/mL   ceFAZolin Sensitive <=2 mcg/mL   cefepime Sensitive <=2 mcg/mL   cefTRIAXone Sensitive <=1 mcg/mL   cefuroxime Sensitive 8 mcg/mL   ciprofloxacin Sensitive <=1 mcg/mL   ertapenem Sensitive <=0.5 mcg/mL   gentamicin Sensitive <=4 mcg/mL   meropenem Sensitive <=1 mcg/mL   nitrofurantoin Sensitive <=32 mcg/mL   piperacillin-tazobactam Sensitive <=16 mcg/mL   trimethoprim-sulfamethoxazole Sensitive <=2/38 mcg/mL      BC x 2 no growth to date    IMAGIN/9 Lumbar MRI w/wo contrast  Discitis and osteomyelitis at T9-10 along with a small epidural abscess/phlegmon and paraspinal inflammatory change     Thoracic MRI w/wo contrast  Discitis and osteomyelitis at T9-10 along with a small epidural abscess/phlegmon and paraspinal inflammatory       Scheduled Meds:   sodium chloride flush  5-40 mL IntraVENous 2 times per day    ceFAZolin  2,000 mg IntraVENous Q8H    lactulose  10 g Oral Daily    rifAXIMin  550 mg Oral BID    miconazole   Topical BID    lidocaine  1 patch TransDERmal Daily    metoprolol succinate  25 mg Oral Daily    CENTRUM/CERTA-MARJAN with minerals oral  15 mL Oral Daily    vitamin D  5,000 Units Oral Daily    sodium chloride flush  5-40 mL IntraVENous 2 times per day    enoxaparin  40 mg SubCUTAneous Daily    pantoprazole  40 mg IntraVENous BID    insulin lispro  0-12 Units SubCUTAneous TID WC    insulin lispro  0-6 Units SubCUTAneous Nightly       Continuous Infusions:   sodium chloride 25 mL (05/10/22 1339)    sodium chloride 100 mL/hr (05/10/22 1624)    dextrose         PRN Meds:  sodium chloride flush, sodium chloride, calcium carbonate, oxyCODONE, ipratropium-albuterol, sodium chloride flush, sodium chloride, ondansetron **OR** ondansetron, polyethylene glycol, glucagon (rDNA), dextrose, dextrose bolus, glucose      Assessment:     Hx DM, cirrhosis  Hx severe COVID-19 pneumonia with resp failure, trach  Lives at nursing facility    Encephalopathy     MSSA bacteremia  T9/10 osteo-diskitis - reviewed MRI 5/9    UTI vs bacteriuria - urine cult - E coli, Klebsiella (both S cefazolin)    Plan:     Cont ancef  F/u BC - no growth to date    PICC - ok to place   See SAVANNAH    Medical Decision Making:   The following items were considered in medical decision making:  Discussion of patient care with other providers  Reviewed clinical lab tests  Reviewed radiology tests  Reviewed other diagnostic tests/interventions  Independent review of radiologic images - reviewed MRI with Radiologist 5/9  Microbiology cultures and other micro tests reviewed      Discussed with pt  Shantelle Segundo MD    INFUSION ORDERS:  Ancef 2 gm iv q 8 hr through 6/22  - Diagnosis - S aureus bacteremia, thoracic diskitis / vertebral osrteomyelitis  - First dose given in hospital  - PICC   - Disposition / date discharge  - Check CBC w diff, CMP, ESR, CRP every Mon or [address] result to [phone]  - Call with antibiotic / infusion issues, [phone]  - Call with any change in status, transfer in or out of a facility or to hospital - [phone]  - No f/u in outpatient ID office necessary

## 2022-05-11 NOTE — PROGRESS NOTES
This patient told the PCA one time during this shift that, this nurse does not know how to do the job. For follow up.

## 2022-05-11 NOTE — PROGRESS NOTES
Hospitalist Progress Note      PCP: No primary care provider on file. Date of Admission: 5/5/2022    Chief Complaint: [address] Course: 59 y.o. female with a past medical history of alcoholic cirrhosis, COVID-pneumonia complicated by respiratory failure status post tracheostomy (no vent requirement), diabetes, who presents from her living facility with altered mental status and possible ingestion.  Patient currently resides in a nursing home [facility]) and was noted to be confused this morning upon awakening.  Squad also states that patient's nursing home reported a visitor around 3 AM, with concern for administering Tylenol PM.  Patient admits to taking 2 these, but denies any other ingestions today.  Patient reporting pain in her back as well as in her abdomen, she has no shortness of breath or chest pain.  No fevers that she is reporting.     Nursing facility reports she has some visitor that frequently visits her and everytime after the visitor comes she would become very lethargic and drowsy. It was reported that she was visited by the visitors again and was given narcan afterwards. But prior UDS neg for opioids. Subjective: No acute events ported overnight. Still has some back pain but tolerable. Denies any other new complaints.     Medications:  Reviewed    Infusion Medications    sodium chloride 25 mL (05/10/22 9143)    sodium chloride 100 mL/hr (05/10/22 6649)    dextrose       Scheduled Medications    sodium chloride flush  5-40 mL IntraVENous 2 times per day    ceFAZolin  2,000 mg IntraVENous Q8H    lactulose  10 g Oral Daily    rifAXIMin  550 mg Oral BID    miconazole   Topical BID    lidocaine  1 patch TransDERmal Daily    metoprolol succinate  25 mg Oral Daily    CENTRUM/CERTA-MARJAN with minerals oral  15 mL Oral Daily    vitamin D  5,000 Units Oral Daily    sodium chloride flush  5-40 mL IntraVENous 2 times per day    enoxaparin  40 mg SubCUTAneous Daily    pantoprazole  40 mg IntraVENous BID    insulin lispro  0-12 Units SubCUTAneous TID     insulin lispro  0-6 Units SubCUTAneous Nightly     PRN Meds: sodium chloride flush, sodium chloride, calcium carbonate, oxyCODONE, ipratropium-albuterol, sodium chloride flush, sodium chloride, ondansetron **OR** ondansetron, polyethylene glycol, glucagon (rDNA), dextrose, dextrose bolus, glucose      Intake/Output Summary (Last 24 hours) at 5/11/2022 1248  Last data filed at 5/11/2022 0947  Gross per 24 hour   Intake 20 ml   Output --   Net 20 ml       Physical Exam Performed:    /70   Pulse 56   Temp 98.4 °F (36.9 °C) (Oral)   Resp 16   Ht 5' 6\" (1.676 m)   Wt 199 lb 8 oz (90.5 kg)   LMP 03/31/1982   SpO2 98%   BMI 32.20 kg/m²     General appearance: Alert and awake, mild distress due to back pain  HEENT: Pupils equal, round, and reactive to light. Conjunctivae/corneas clear. Neck: Supple, with full range of motion. No jugular venous distention. Trachea midline. Respiratory:  Normal respiratory effort. Clear to auscultation, bilaterally without Rales/Wheezes/Rhonchi. Cardiovascular: Regular rate and rhythm with normal S1/S2 without murmurs, rubs or gallops. Abdomen: Soft, non-tender, non-distended with normal bowel sounds. Musculoskeletal: No clubbing, cyanosis or edema bilaterally. Full range of motion without deformity. Skin: Skin color, texture, turgor normal.  No rashes or lesions. Neurologic:  Neurovascularly intact without any focal sensory/motor deficits.  Cranial nerves: II-XII intact, grossly non-focal.  Psychiatric: less confused today  Capillary Refill: Brisk,3 seconds, normal   Peripheral Pulses: +2 palpable, equal bilaterally       Labs:   Recent Labs     05/10/22  1000 05/11/22  0541   WBC 4.3 4.2   HGB 12.6 11.1*   HCT 40.2 33.5*   PLT 94* 112*     Recent Labs     05/10/22  1000 05/11/22  0541    136   K 3.3* 3.3*    100   CO2 24 25   BUN 9 7   CREATININE 0.7 0.6   CALCIUM 10.1 9.7     Recent Labs     05/10/22  1000 05/11/22  0541   AST 19 18   ALT 9* 7*   BILITOT 1.6* 1.4*   ALKPHOS 129 125     No results for input(s): INR in the last 72 hours. No results for input(s): Juan Seed in the last 72 hours. Urinalysis:      Lab Results   Component Value Date    NITRU POSITIVE 05/05/2022    WBCUA 3-5 05/05/2022    BACTERIA 4+ 05/05/2022    RBCUA None seen 05/05/2022    BLOODU Negative 05/05/2022    SPECGRAV 1.010 05/05/2022    GLUCOSEU Negative 05/05/2022       Radiology:  MRI THORACIC SPINE W WO CONTRAST   Final Result      Discitis and osteomyelitis at T9-10 along with a small epidural abscess/phlegmon and paraspinal inflammatory change. MRI LUMBAR SPINE W WO CONTRAST   Final Result      Discitis and osteomyelitis at T9-10 along with a small epidural abscess/phlegmon and paraspinal inflammatory change. US ABDOMEN LIMITED   Final Result      No ascites identified               CT HEAD WO CONTRAST   Final Result      No acute intracranial pathology                 XR CHEST PORTABLE   Final Result      No acute cardiopulmonary disease.                  Assessment/Plan:    Active Hospital Problems    Diagnosis     Acute metabolic encephalopathy [N90.70]      Priority: Medium    Hyperammonemia ([facility] [E72.20]      Priority: Medium    Hyponatremia [E87.1]      Priority: Medium    Azotemia [R79.89]      Priority: Medium    MALENA (acute kidney injury) ([facility] [N17.9]      Priority: Medium    Altered mental status [R41.82]      Priority: Medium     Acute metabolic encephalopathy likely 2/2 hepatic encephalopathy  Alcoholic cirrhosis  - HCV abx negative (1/13/22), HBV negative (not vaccinated)  - Continue lactulose and rifaximin     Back pain due to OM/Discitis T9-T10 with epidural abscess  MSSA bacteremia  - ID following  - Echo was negative for vegetations  - Continue Ancef 2 gm iv q 8 hr through 6/22  - Orders placed for PICC line placement    Hypomagnesemia/hypokalemia  - Replace and recheck    Acute kidney injury  - Likely prerenal, improved  -Continue to hold spironolactone and torsemide at discharge as per nephrology    PUD  - March 2022 EGD showed 3 clean based antral ulcers and duodenal ulcers  -Continue Protonix twice daily on discharge     DMII with neuropathy  - sliding scale     Chronic hypoxic resp 2/2 COVID PNA s/p trach  Chronic COPD not in exacerbation  - Hx of Staph aureus, Serratia, Streno per bronchs in 11/2021 - 12/2021  - has chronic trach  - COPD not in exacerbation  - duoneb PRN     Chronic Diastolic CHF  -Compensated  - TTE 1/2022 showed normal EF     Bipolar  -On Seroquel     DVT Prophylaxis: heparin  Diet: ADULT DIET; Regular  Code Status: Full Code    PT/OT Eval Status: Ongoing    Dispo -stable for discharge.   Awaiting placement to SNF    [facility], MD

## 2022-05-11 NOTE — PROGRESS NOTES
This patient has called out for this nurse at this time crying because she is in pain. But I just gave her pain medication 5 minutes ago and this nurse told her that she has to wait for the medication to kick in. But when this nurse is on the way out, she told me that I am rude. And I ask her why but she said that I don't help her with her pain.

## 2022-05-12 LAB
A/G RATIO: 0.9 (ref 1.1–2.2)
ALBUMIN SERPL-MCNC: 3.4 G/DL (ref 3.4–5)
ALP BLD-CCNC: 138 U/L (ref 40–129)
ALT SERPL-CCNC: 6 U/L (ref 10–40)
ANION GAP SERPL CALCULATED.3IONS-SCNC: 8 MMOL/L (ref 3–16)
AST SERPL-CCNC: 18 U/L (ref 15–37)
BASOPHILS ABSOLUTE: 0 K/UL (ref 0–0.2)
BASOPHILS RELATIVE PERCENT: 0.7 %
BILIRUB SERPL-MCNC: 1.4 MG/DL (ref 0–1)
BUN BLDV-MCNC: 6 MG/DL (ref 7–20)
CALCIUM SERPL-MCNC: 9.7 MG/DL (ref 8.3–10.6)
CHLORIDE BLD-SCNC: 103 MMOL/L (ref 99–110)
CO2: 24 MMOL/L (ref 21–32)
CREAT SERPL-MCNC: 0.6 MG/DL (ref 0.6–1.1)
EOSINOPHILS ABSOLUTE: 0.2 K/UL (ref 0–0.6)
EOSINOPHILS RELATIVE PERCENT: 3.9 %
GFR AFRICAN AMERICAN: >60
GFR NON-AFRICAN AMERICAN: >60
GLUCOSE BLD-MCNC: 103 MG/DL (ref 70–99)
GLUCOSE BLD-MCNC: 104 MG/DL (ref 70–99)
GLUCOSE BLD-MCNC: 84 MG/DL (ref 70–99)
GLUCOSE BLD-MCNC: 94 MG/DL (ref 70–99)
GLUCOSE BLD-MCNC: 95 MG/DL (ref 70–99)
HCT VFR BLD CALC: 37.1 % (ref 36–48)
HEMOGLOBIN: 11.8 G/DL (ref 12–16)
LYMPHOCYTES ABSOLUTE: 0.9 K/UL (ref 1–5.1)
LYMPHOCYTES RELATIVE PERCENT: 21.8 %
MCH RBC QN AUTO: 28.5 PG (ref 26–34)
MCHC RBC AUTO-ENTMCNC: 31.9 G/DL (ref 31–36)
MCV RBC AUTO: 89.3 FL (ref 80–100)
MONOCYTES ABSOLUTE: 0.4 K/UL (ref 0–1.3)
MONOCYTES RELATIVE PERCENT: 9.7 %
NEUTROPHILS ABSOLUTE: 2.7 K/UL (ref 1.7–7.7)
NEUTROPHILS RELATIVE PERCENT: 63.9 %
PDW BLD-RTO: 15.1 % (ref 12.4–15.4)
PERFORMED ON: ABNORMAL
PERFORMED ON: NORMAL
PLATELET # BLD: 96 K/UL (ref 135–450)
PMV BLD AUTO: 7.2 FL (ref 5–10.5)
POTASSIUM REFLEX MAGNESIUM: 4.3 MMOL/L (ref 3.5–5.1)
RBC # BLD: 4.16 M/UL (ref 4–5.2)
SODIUM BLD-SCNC: 135 MMOL/L (ref 136–145)
TOTAL PROTEIN: 7.3 G/DL (ref 6.4–8.2)
WBC # BLD: 4.2 K/UL (ref 4–11)

## 2022-05-12 PROCEDURE — 6370000000 HC RX 637 (ALT 250 FOR IP): Performed by: INTERNAL MEDICINE

## 2022-05-12 PROCEDURE — 99232 SBSQ HOSP IP/OBS MODERATE 35: CPT | Performed by: INTERNAL MEDICINE

## 2022-05-12 PROCEDURE — 85025 COMPLETE CBC W/AUTO DIFF WBC: CPT

## 2022-05-12 PROCEDURE — 2580000003 HC RX 258: Performed by: INTERNAL MEDICINE

## 2022-05-12 PROCEDURE — 6360000002 HC RX W HCPCS: Performed by: INTERNAL MEDICINE

## 2022-05-12 PROCEDURE — C9113 INJ PANTOPRAZOLE SODIUM, VIA: HCPCS | Performed by: INTERNAL MEDICINE

## 2022-05-12 PROCEDURE — 80053 COMPREHEN METABOLIC PANEL: CPT

## 2022-05-12 PROCEDURE — 36415 COLL VENOUS BLD VENIPUNCTURE: CPT

## 2022-05-12 PROCEDURE — 1200000000 HC SEMI PRIVATE

## 2022-05-12 RX ADMIN — PANTOPRAZOLE SODIUM 40 MG: 40 INJECTION, POWDER, FOR SOLUTION INTRAVENOUS at 09:19

## 2022-05-12 RX ADMIN — RIFAXIMIN 550 MG: 550 TABLET ORAL at 09:19

## 2022-05-12 RX ADMIN — MICONAZOLE NITRATE: 20 CREAM TOPICAL at 09:24

## 2022-05-12 RX ADMIN — RIFAXIMIN 550 MG: 550 TABLET ORAL at 20:33

## 2022-05-12 RX ADMIN — OXYCODONE 10 MG: 5 TABLET ORAL at 20:24

## 2022-05-12 RX ADMIN — Medication 5000 UNITS: at 09:19

## 2022-05-12 RX ADMIN — CEFAZOLIN SODIUM 2000 MG: 10 INJECTION, POWDER, FOR SOLUTION INTRAVENOUS at 23:18

## 2022-05-12 RX ADMIN — SODIUM CHLORIDE 20 ML/HR: 9 INJECTION, SOLUTION INTRAVENOUS at 15:43

## 2022-05-12 RX ADMIN — MICONAZOLE NITRATE: 20 CREAM TOPICAL at 20:33

## 2022-05-12 RX ADMIN — CEFAZOLIN SODIUM 2000 MG: 10 INJECTION, POWDER, FOR SOLUTION INTRAVENOUS at 15:44

## 2022-05-12 RX ADMIN — SODIUM CHLORIDE, PRESERVATIVE FREE 10 ML: 5 INJECTION INTRAVENOUS at 09:20

## 2022-05-12 RX ADMIN — CEFAZOLIN SODIUM 2000 MG: 10 INJECTION, POWDER, FOR SOLUTION INTRAVENOUS at 06:48

## 2022-05-12 RX ADMIN — OXYCODONE 10 MG: 5 TABLET ORAL at 04:30

## 2022-05-12 RX ADMIN — Medication 15 ML: at 09:24

## 2022-05-12 RX ADMIN — OXYCODONE 10 MG: 5 TABLET ORAL at 13:23

## 2022-05-12 RX ADMIN — PANTOPRAZOLE SODIUM 40 MG: 40 INJECTION, POWDER, FOR SOLUTION INTRAVENOUS at 20:33

## 2022-05-12 RX ADMIN — OXYCODONE 10 MG: 5 TABLET ORAL at 09:18

## 2022-05-12 RX ADMIN — METOPROLOL SUCCINATE 25 MG: 25 TABLET, FILM COATED, EXTENDED RELEASE ORAL at 09:19

## 2022-05-12 RX ADMIN — ENOXAPARIN SODIUM 40 MG: 100 INJECTION SUBCUTANEOUS at 09:19

## 2022-05-12 ASSESSMENT — PAIN SCALES - GENERAL
PAINLEVEL_OUTOF10: 9
PAINLEVEL_OUTOF10: 8
PAINLEVEL_OUTOF10: 8
PAINLEVEL_OUTOF10: 9
PAINLEVEL_OUTOF10: 8
PAINLEVEL_OUTOF10: 8
PAINLEVEL_OUTOF10: 9

## 2022-05-12 ASSESSMENT — PAIN DESCRIPTION - LOCATION
LOCATION: BACK

## 2022-05-12 ASSESSMENT — PAIN DESCRIPTION - FREQUENCY
FREQUENCY: CONTINUOUS
FREQUENCY: CONTINUOUS

## 2022-05-12 ASSESSMENT — PAIN DESCRIPTION - ORIENTATION
ORIENTATION: LOWER;MID
ORIENTATION: LOWER

## 2022-05-12 ASSESSMENT — PAIN DESCRIPTION - DESCRIPTORS
DESCRIPTORS: STABBING;PRESSURE
DESCRIPTORS: STABBING;PRESSURE

## 2022-05-12 ASSESSMENT — PAIN DESCRIPTION - ONSET
ONSET: ON-GOING
ONSET: ON-GOING

## 2022-05-12 ASSESSMENT — PAIN DESCRIPTION - PAIN TYPE
TYPE: CHRONIC PAIN
TYPE: CHRONIC PAIN

## 2022-05-12 ASSESSMENT — PAIN - FUNCTIONAL ASSESSMENT
PAIN_FUNCTIONAL_ASSESSMENT: PREVENTS OR INTERFERES SOME ACTIVE ACTIVITIES AND ADLS
PAIN_FUNCTIONAL_ASSESSMENT: PREVENTS OR INTERFERES SOME ACTIVE ACTIVITIES AND ADLS

## 2022-05-12 NOTE — CARE COORDINATION
CM cont to follow for  D/c planning:    [facility] - can accept clinically;  Lizbeth Dai is  Admission s contact  [phone]  - CM called Nalini Rodriguez [phone] in [place] who will call pt to discuss applying for Medicaid    - Patient was  updatedat bedside about DC plan. - Pt in agreement with SNF, possibly LTC.   - CM informed her that Nalini Rodriguez in finance will be calling her to discuss Medicaid. [place] [address]       Phone: [phone]       Fax: [phone]        CM /SW will cont to follow      Electronically signed by Akil Victoria RN on 5/12/2022 at 9:29 AM        Akil Victoria RN Case Manager  [facility]  [address] [place] [address].   [facility] [address]  [phone]  Fax [phone]

## 2022-05-12 NOTE — PROGRESS NOTES
ID Follow-up NOTE    CC:   Staphylococcus aureus bacteremia, Thoracic osteo-diskitis  Antibiotics Ancef    Admit Date: 5/5/2022  Hospital Day: 8    Subjective:     Patient c/o LBP, 'middle' of back, no radiation - only wants to lie flat due to pain  No urinary complaints    Objective:     Patient Vitals for the past 8 hrs:   BP Temp Temp src Pulse Resp SpO2   05/12/22 0805 109/65 98.5 °F (36.9 °C) Oral 62 16 94 %   05/12/22 0427 119/73 99.4 °F (37.4 °C) Oral 62 16 91 %     I/O last 3 completed shifts: In: 500 [P.O.:480; I.V.:20]  Out: -   No intake/output data recorded. EXAM:  GENERAL: No apparent distress.   RA  HEENT: Membranes moist, no oral lesion  NECK:  Supple, no lymphadenopathy - trach in place, capped  LUNGS: Clear b/l, no rales, no dullness  CARDIAC: RRR, no murmur appreciated  ABD:  + BS, soft / NT  EXT:  No rash, no edema, no lesions  NEURO: No focal neurologic findings  PSYCH: Orientation, sensorium, mood normal  LINES:  L PICC in place       Data Review:  Lab Results   Component Value Date    WBC 4.2 05/12/2022    HGB 11.8 (L) 05/12/2022    HCT 37.1 05/12/2022    MCV 89.3 05/12/2022    PLT 96 (L) 05/12/2022     Lab Results   Component Value Date    CREATININE 0.6 05/12/2022    BUN 6 (L) 05/12/2022     (L) 05/12/2022    K 4.3 05/12/2022     05/12/2022    CO2 24 05/12/2022       Hepatic Function Panel:   Lab Results   Component Value Date    ALKPHOS 138 05/12/2022    ALT 6 05/12/2022    AST 18 05/12/2022    PROT 7.3 05/12/2022    BILITOT 1.4 05/12/2022    BILIDIR 0.3 05/05/2022    IBILI 1.2 05/05/2022    LABALBU 3.4 05/12/2022       MICRO:  5/5 BC x 1 S aureus   Antibiotic Interpretation Microscan    clindamycin Sensitive <=0.25 mcg/mL   erythromycin Resistant >=8 mcg/mL   oxacillin Sensitive <=0.25 mcg/mL   tetracycline Sensitive <=1 mcg/mL   trimethoprim-sulfamethoxazole Sensitive <=10 mcg/mL     5/5 Urine cult >100k E coli, K pneumoniae  Klebsiella pneumoniae (2)  Antibiotic Interpretation Microscan    amoxicillin-clavulanate Sensitive <=8/4 mcg/mL   ampicillin Resistant >16 mcg/mL   ampicillin-sulbactam Sensitive <=8/4 mcg/mL   ceFAZolin Sensitive <=2 mcg/mL   cefepime Sensitive <=2 mcg/mL   cefTRIAXone Sensitive <=1 mcg/mL   cefuroxime Intermediate 16 mcg/mL   ciprofloxacin Sensitive <=1 mcg/mL   ertapenem Sensitive <=0.5 mcg/mL   gentamicin Sensitive <=4 mcg/mL   meropenem Sensitive <=1 mcg/mL   nitrofurantoin Resistant >64 mcg/mL   piperacillin-tazobactam Sensitive <=16 mcg/mL   trimethoprim-sulfamethoxazole Sensitive <=2/38 mcg/mL     Escherichia coli (1)  Antibiotic Interpretation Microscan    ampicillin Sensitive <=8 mcg/mL   ampicillin-sulbactam Sensitive <=8/4 mcg/mL   ceFAZolin Sensitive <=2 mcg/mL   cefepime Sensitive <=2 mcg/mL   cefTRIAXone Sensitive <=1 mcg/mL   cefuroxime Sensitive 8 mcg/mL   ciprofloxacin Sensitive <=1 mcg/mL   ertapenem Sensitive <=0.5 mcg/mL   gentamicin Sensitive <=4 mcg/mL   meropenem Sensitive <=1 mcg/mL   nitrofurantoin Sensitive <=32 mcg/mL   piperacillin-tazobactam Sensitive <=16 mcg/mL   trimethoprim-sulfamethoxazole Sensitive <=2/38 mcg/mL      BC x 2 no growth     IMAGIN/9 Lumbar MRI w/wo contrast  Discitis and osteomyelitis at T9-10 along with a small epidural abscess/phlegmon and paraspinal inflammatory change     Thoracic MRI w/wo contrast  Discitis and osteomyelitis at T9-10 along with a small epidural abscess/phlegmon and paraspinal inflammatory       Scheduled Meds:   sodium chloride flush  5-40 mL IntraVENous 2 times per day    ceFAZolin  2,000 mg IntraVENous Q8H    lactulose  10 g Oral Daily    rifAXIMin  550 mg Oral BID    miconazole   Topical BID    lidocaine  1 patch TransDERmal Daily    metoprolol succinate  25 mg Oral Daily    CENTRUM/CERTA-MARJAN with minerals oral  15 mL Oral Daily    vitamin D  5,000 Units Oral Daily    sodium chloride flush  5-40 mL IntraVENous 2 times per day    enoxaparin  40 mg SubCUTAneous Daily    pantoprazole  40 mg IntraVENous BID    insulin lispro  0-12 Units SubCUTAneous TID WC    insulin lispro  0-6 Units SubCUTAneous Nightly       Continuous Infusions:   sodium chloride 25 mL (05/10/22 1339)    sodium chloride 100 mL/hr (05/10/22 1624)    dextrose         PRN Meds:  sodium chloride flush, sodium chloride, calcium carbonate, oxyCODONE, ipratropium-albuterol, sodium chloride flush, sodium chloride, ondansetron **OR** ondansetron, polyethylene glycol, glucagon (rDNA), dextrose, dextrose bolus, glucose      Assessment:     Hx DM, cirrhosis  Hx severe COVID-19 pneumonia with resp failure, trach  Lives at nursing facility    Encephalopathy     MSSA bacteremia, f/u BC no growth  T9/10 osteo-diskitis - reviewed MRI 5/9    UTI vs bacteriuria - urine cult - E coli, Klebsiella (both S cefazolin)    Plan:     Cont ancef   See SAVANNAH    Medical Decision Making:   The following items were considered in medical decision making:  Discussion of patient care with other providers  Reviewed clinical lab tests  Reviewed radiology tests  Reviewed other diagnostic tests/interventions  Independent review of radiologic images - reviewed MRI with Radiologist 5/9  Microbiology cultures and other micro tests reviewed      Discussed with pt  Stephanie Stevens MD    INFUSION ORDERS:  Ancef 2 gm iv q 8 hr through 6/22  - Diagnosis - S aureus bacteremia, thoracic diskitis / vertebral osrteomyelitis  - First dose given in hospital  - PICC   - Disposition / date discharge  - Check CBC w diff, CMP, ESR, CRP every Mon or [address] result to [phone]  - Call with antibiotic / infusion issues, [phone]  - Call with any change in status, transfer in or out of a facility or to hospital - [phone]  - No f/u in outpatient ID office necessary

## 2022-05-12 NOTE — PROGRESS NOTES
Occupational Therapy/Physical Therapy  Refusal      Pt in bed on arrival. Attempted PT/OT treatment. Pt adamantly declining EOB/OOB at this time. Pt indicating significant back pain and stating she wants to be still. \"I just need to find a comfortable spot and stay there. \" Pt assisted onto bedpan per pt request - pt bridged to get onto bedpan. Pt aware to call nurse for assist when finished. Continue per POC.     Stephanie Setting OTR/L [address], [place]

## 2022-05-12 NOTE — CARE COORDINATION
CM met with pt at bedside again to discuss recommendation of going to a facility due to medical concerns. Pt still insisting on going home and trying to get in touch with supports. Pt reported speaking with her son who is willing to assist.  When CM asked about pt's leg pain and inability to ambulate at this time, and asked if her son is able to provide 24-care, pt was unable to provide clear answers. CM left vm for son, Tasia Goldberg [phone]. Electronically signed by YOLA Gill, JENY on 5/12/2022 at 5:29 PM  ____________________________________________________________________    CM met with pt at bedside with Yandel from finance on the phone to start medicaid process. Nga Busby informed pt that she would have a liability to pay while in a facility, which would be all of pt's SSI check and she would be given $50/month. Pt is adamant about not wanting to proceed with this and not being able to access her income. Pt wants to DC home, and reported having people that can possibly assist her at home but not having their phone numbers on her. No current emergency contacts in pt's chart. CM searched previous hospitalization notes, found son, Tasia Goldberg [phone]. CM also called [facility] to see if they have any contacts on file. Norman Loja [phone], CELINA (niece) [phone] & [phone]. CM left vm with Norman Loja and Dre Green. CM messaged ID about pt going home instead of SNF/LTC and possible changes to IV abx. CM also messaged Attending to update that pt insists on going home despite safety concerns.         Electronically signed by YOLA Gill, JENY on 5/12/2022 at 1:23 PM

## 2022-05-12 NOTE — PROGRESS NOTES
COmplains of sore throat, head and nose congestion and right ear pain for the last three days. Nephrology  Note                                                                                                                                                                                                                                                                                                                                                               Office : [phone]     Fax :[phone]              Patient's Name: NITA Etienne    Good uO   Cr better   No N/V/D         Past Medical History:   Diagnosis Date    CHF (congestive heart failure) ([address]     Hyperlipidemia     Obesity     Tracheostomy in place [facility])        Past Surgical History:   Procedure Laterality Date    UPPER GASTROINTESTINAL ENDOSCOPY N/A 3/31/2022    EGD BIOPSY performed by Klarissa Garcia MD at [facility] reviewed. No pertinent family history. reports that she has quit smoking. She has never used smokeless tobacco. She reports previous alcohol use. She reports that she does not use drugs. Allergies:  Patient has no known allergies.     Current Medications:    sodium chloride flush 0.9 % injection 5-40 mL, 2 times per day  sodium chloride flush 0.9 % injection 5-40 mL, PRN  0.9 % sodium chloride infusion, PRN  ceFAZolin (ANCEF) 2000 mg in dextrose 5 % 50 mL IVPB, Q8H  calcium carbonate (TUMS) chewable tablet 500 mg, TID PRN  oxyCODONE (ROXICODONE) immediate release tablet 10 mg, Q4H PRN  lactulose (CHRONULAC) 10 GM/15ML solution 10 g, Daily  rifAXIMin (XIFAXAN) tablet 550 mg, BID  miconazole (MICOTIN) 2 % cream, BID  lidocaine 4 % external patch 1 patch, Daily  ipratropium-albuterol (DUONEB) nebulizer solution 3 mL, Q4H PRN  metoprolol succinate (TOPROL XL) extended release tablet 25 mg, Daily  CENTRUM/CERTA-MARJAN with minerals oral solution 15 mL, Daily  vitamin D (CHOLECALCIFEROL) capsule 5,000 Units, Daily  sodium chloride flush 0.9 % injection 5-40 mL, 2 times per day  sodium chloride flush 0.9 % injection 5-40 mL, PRN  0.9 % sodium chloride infusion, PRN  enoxaparin (LOVENOX) injection 40 mg, Daily  ondansetron (ZOFRAN-ODT) disintegrating tablet 4 mg, Q8H PRN   Or  ondansetron (ZOFRAN) injection 4 mg, Q6H PRN  polyethylene glycol (GLYCOLAX) packet 17 g, Daily PRN  pantoprazole (PROTONIX) injection 40 mg, BID  insulin lispro (1 Unit Dial) 0-12 Units, TID WC  insulin lispro (1 Unit Dial) 0-6 Units, Nightly  glucagon (rDNA) injection 1 mg, PRN  dextrose 5 % solution, PRN  dextrose bolus 10% 250 mL, PRN  glucose chewable tablet 16 g, PRN        Review of Systems:   14 point ROS obtained but were negative except mentioned in HPI      Physical exam:     Vitals:  /68   Pulse 69   Temp 98.7 °F (37.1 °C) (Oral)   Resp 14   Ht 5' 6\" (1.676 m)   Wt 199 lb 8 oz (90.5 kg)   LMP 03/31/1982   SpO2 90%   BMI 32.20 kg/m²   Constitutional:  OAA X3 NAD  Skin: no rash, turgor wnl  Heent:  eomi, mmm  Neck: no bruits or jvd noted  Cardiovascular:  S1, S2 without m/r/g  Respiratory: dec BS   Abdomen:  +bs, soft, nt, nd  Ext: ++ lower extremity edema  Psychiatric: mood and affect flat   Musculoskeletal:  Rom, muscular strength intact    Data:   Labs:  CBC:   Recent Labs     05/10/22  1000 05/11/22  0541   WBC 4.3 4.2   HGB 12.6 11.1*   PLT 94* 112*     BMP:    Recent Labs     05/10/22  1000 05/11/22  0541    136   K 3.3* 3.3*    100   CO2 24 25   BUN 9 7   CREATININE 0.7 0.6   GLUCOSE 101* 103*     Ca/Mg/Phos:   Recent Labs     05/10/22  1000 05/11/22  0541   CALCIUM 10.1 9.7   MG 1.30* 2.10     Hepatic:   Recent Labs     05/10/22  1000 05/11/22  0541   AST 19 18   ALT 9* 7*   BILITOT 1.6* 1.4*   ALKPHOS 129 125     Troponin: No results for input(s): TROPONINI in the last 72 hours. BNP: No results for input(s): BNP in the last 72 hours. Lipids: No results for input(s): CHOL, TRIG, HDL, LDLCALC, LABVLDL in the last 72 hours.   ABGs: No results for input(s): PHART, PO2ART, [id number] in the last 72 hours.  INR:   No results for input(s): INR in the last 72 hours. UA:  No results for input(s): Evlyn Gaudy, GLUCOSEU, BILIRUBINUR, KETUA, SPECGRAV, BLOODU, PHUR, PROTEINU, UROBILINOGEN, NITRU, LEUKOCYTESUR, Menard Lipschutz in the last 72 hours. Urine Microscopic:   No results for input(s): LABCAST, BACTERIA, COMU, HYALCAST, WBCUA, RBCUA, EPIU in the last 72 hours. Urine Culture:   No results for input(s): LABURIN in the last 72 hours. Urine Chemistry: No results for input(s): Chio Gunnels, PROTEINUR, NAUR in the last 72 hours. IMAGING:  MRI THORACIC SPINE W WO CONTRAST   Final Result      Discitis and osteomyelitis at T9-10 along with a small epidural abscess/phlegmon and paraspinal inflammatory change. MRI LUMBAR SPINE W WO CONTRAST   Final Result      Discitis and osteomyelitis at T9-10 along with a small epidural abscess/phlegmon and paraspinal inflammatory change. US ABDOMEN LIMITED   Final Result      No ascites identified               CT HEAD WO CONTRAST   Final Result      No acute intracranial pathology                 XR CHEST PORTABLE   Final Result      No acute cardiopulmonary disease. Assessment/Plan   1. AMS     2. Alc cirrhosis     3. Anemia    4. Acid- base/ Electrolyte imbalance     5.  MALENA     Plan   - off octreotide + Midodrine   - Hold diuretics    - replace K  - Ur studies - reviewed   - Monitor UO and renal function   - labs in am   - Chronic hypoxic resp 2/2 COVID PNA s/p trach    Recommend to dose adjust all medications  based on renal functions  Maintain SBP> 90 mmHg   Daily weights   AVOID NSAIDs  Avoid Nephrotoxins  Monitor Intake/Output  Call if significant decrease in urine output                 Thank you for allowing us to participate in care of Hari Duron MD  Feel free to contact me   [facility] [address]  Office : [phone]  Fax :[phone]

## 2022-05-12 NOTE — PROGRESS NOTES
HOSPITALISTS PROGRESS NOTE    5/12/2022 5:03 PM        Name: Princess Rutledge . Admitted: 5/5/2022  Primary Care Provider: No primary care provider on file. (Tel: None)      Problem List  Principal Problem:    Acute metabolic encephalopathy  Active Problems:    Hyperammonemia (HCC)    Hyponatremia    Azotemia    MALENA (acute kidney injury) ([facility] [address]    Altered mental status  Resolved Problems:    * No resolved hospital problems. *       Assessment & Plan:   MSSA bacteremia with T9-T10 osteomyelitis/discitis with epidural abscess  Echo negative for vegetation  PICC placed, on Ancef, ID input noticed    Bacteriuria versus UTI  Ancef will cover discussed with ID    MALENA improved  Continue to hold spironolactone and torsemide on discharge for now    Peptic ulcer disease  March EGD showing 3 clean-based antral ulcers and duodenal ulcers continue on Protonix twice daily on discharge      Alcoholic cirrhosis hepatitis C antibody negative hepatitis B negative  On lactulose and rifaximin    Diabetes mellitus with neuropathy  Chronic diastolic heart failure  Bipolar on Seroquel        IV Access: Peripheral  Briggs: No  Diet: ADULT DIET; Regular  Code:Full Code  DVT PPX Heparin  Disposition await placement    Brief Course:     CC: Bacteremia    Subjective:  .     Complains of back pain, wants to talk to his son for further plan of care,    Reviewed interval ancillary notes    Current Medications  sodium chloride flush 0.9 % injection 5-40 mL, 2 times per day  sodium chloride flush 0.9 % injection 5-40 mL, PRN  0.9 % sodium chloride infusion, PRN  ceFAZolin (ANCEF) 2000 mg in dextrose 5 % 50 mL IVPB, Q8H  calcium carbonate (TUMS) chewable tablet 500 mg, TID PRN  oxyCODONE (ROXICODONE) immediate release tablet 10 mg, Q4H PRN  lactulose (CHRONULAC) 10 GM/15ML solution 10 g, Daily  rifAXIMin (XIFAXAN) tablet 550 mg, BID  miconazole (MICOTIN) 2 % cream, BID  lidocaine 4 % external patch 1 patch, Daily  ipratropium-albuterol (DUONEB) nebulizer solution 3 mL, Q4H PRN  metoprolol succinate (TOPROL XL) extended release tablet 25 mg, Daily  CENTRUM/CERTA-MARJAN with minerals oral solution 15 mL, Daily  vitamin D (CHOLECALCIFEROL) capsule 5,000 Units, Daily  sodium chloride flush 0.9 % injection 5-40 mL, 2 times per day  sodium chloride flush 0.9 % injection 5-40 mL, PRN  0.9 % sodium chloride infusion, PRN  enoxaparin (LOVENOX) injection 40 mg, Daily  ondansetron (ZOFRAN-ODT) disintegrating tablet 4 mg, Q8H PRN   Or  ondansetron (ZOFRAN) injection 4 mg, Q6H PRN  polyethylene glycol (GLYCOLAX) packet 17 g, Daily PRN  pantoprazole (PROTONIX) injection 40 mg, BID  insulin lispro (1 Unit Dial) 0-12 Units, TID WC  insulin lispro (1 Unit Dial) 0-6 Units, Nightly  glucagon (rDNA) injection 1 mg, PRN  dextrose 5 % solution, PRN  dextrose bolus 10% 250 mL, PRN  glucose chewable tablet 16 g, PRN        Objective:  /69   Pulse 67   Temp 99.1 °F (37.3 °C) (Oral)   Resp 16   Ht 5' 6\" (1.676 m)   Wt 199 lb 8 oz (90.5 kg)   LMP 03/31/1982   SpO2 95%   BMI 32.20 kg/m²     Intake/Output Summary (Last 24 hours) at 5/12/2022 1703  Last data filed at 5/12/2022 0700  Gross per 24 hour   Intake 480 ml   Output --   Net 480 ml      Wt Readings from Last 3 Encounters:   05/05/22 199 lb 8 oz (90.5 kg)   04/04/22 229 lb 15 oz (104.3 kg)       General appearance:  Appears comfortable  Eyes: Sclera clear. Pupils equal.  ENT: Moist oral mucosa. Trachea midline, no adenopathy. Cardiovascular: Regular rhythm, normal S1, S2. No murmur. No edema in lower extremities  Respiratory: Not using accessory muscles. Good inspiratory effort. Clear to auscultation bilaterally, no wheeze or crackles. GI: Abdomen soft, no tenderness, not distended, normal bowel sounds  Musculoskeletal: No cyanosis in digits, neck supple  Neurology: CN 2-12 grossly intact.  No speech or motor deficits  Psych: Normal

## 2022-05-12 NOTE — PLAN OF CARE
Problem: Discharge Planning  Goal: Discharge to home or other facility with appropriate resources  Note: Patient now wanting to discharge home, as she will be paying most of her social security check to facility.  trying to locate family. Problem: Skin/Tissue Integrity  Goal: Absence of new skin breakdown  Description: 1. Monitor for areas of redness and/or skin breakdown  2. Assess vascular access sites hourly  3. Every 4-6 hours minimum:  Change oxygen saturation probe site  4. Every 4-6 hours:  If on nasal continuous positive airway pressure, respiratory therapy assess nares and determine need for appliance change or resting period. Note: Red painful peeling rash to groin, abdominal fold, and yash area. Area cleansed and antifungal cream applied. She repositions on her own. Incontinent at times. Brief is off to allow air to skin. Problem: Safety - Adult  Goal: Free from fall injury  Note: She is a fall risk. Door open, and bed alarm on. Call light in reach. Problem: Chronic Conditions and Co-morbidities  Goal: Patient's chronic conditions and co-morbidity symptoms are monitored and maintained or improved  Note: She remains on IV antibiotics for spinal infection. ID following. No fever, WBC wnl. Problem: Pain  Goal: Verbalizes/displays adequate comfort level or baseline comfort level  Note: Medicated with oxycodone every 4 hours. She rates pain an 8 or 9 always. She refused therapy, as she states moving makes pain worse. She sleeps off and on throughout the day.

## 2022-05-12 NOTE — PROGRESS NOTES
Nephrology  Note                                                                                                                                                                                                                                                                                                                                                               Office : [phone]     Fax :[phone]              Patient's Name: NITA Dolanfues    Good uO   Cr better   No N/V/D         Past Medical History:   Diagnosis Date    CHF (congestive heart failure) ([address]     Hyperlipidemia     Obesity     Trache[facility] in place [facility])        Past Surgical History:   Procedure Laterality Date    UPPER GASTROINTESTINAL ENDOSCOPY N/A 3/31/2022    EGD BIOPSY performed by Vanessa Vaughn MD at [facility] reviewed. No pertinent family history. reports that she has quit smoking. She has never used smokeless tobacco. She reports previous alcohol use. She reports that she does not use drugs. Allergies:  Patient has no known allergies.     Current Medications:    sodium chloride flush 0.9 % injection 5-40 mL, 2 times per day  sodium chloride flush 0.9 % injection 5-40 mL, PRN  0.9 % sodium chloride infusion, PRN  ceFAZolin (ANCEF) 2000 mg in dextrose 5 % 50 mL IVPB, Q8H  calcium carbonate (TUMS) chewable tablet 500 mg, TID PRN  oxyCODONE (ROXICODONE) immediate release tablet 10 mg, Q4H PRN  lactulose (CHRONULAC) 10 GM/15ML solution 10 g, Daily  rifAXIMin (XIFAXAN) tablet 550 mg, BID  miconazole (MICOTIN) 2 % cream, BID  lidocaine 4 % external patch 1 patch, Daily  ipratropium-albuterol (DUONEB) nebulizer solution 3 mL, Q4H PRN  metoprolol succinate (TOPROL XL) extended release tablet 25 mg, Daily  CENTRUM/CERTA-MARJAN with minerals oral solution 15 mL, Daily  vitamin D (CHOLECALCIFEROL) capsule 5,000 Units, Daily  sodium chloride flush 0.9 % injection 5-40 mL, 2 times per day  sodium chloride flush 0.9 % injection 5-40 mL, PRN  0.9 % sodium chloride infusion, PRN  enoxaparin (LOVENOX) injection 40 mg, Daily  ondansetron (ZOFRAN-ODT) disintegrating tablet 4 mg, Q8H PRN   Or  ondansetron (ZOFRAN) injection 4 mg, Q6H PRN  polyethylene glycol (GLYCOLAX) packet 17 g, Daily PRN  pantoprazole (PROTONIX) injection 40 mg, BID  insulin lispro (1 Unit Dial) 0-12 Units, TID WC  insulin lispro (1 Unit Dial) 0-6 Units, Nightly  glucagon (rDNA) injection 1 mg, PRN  dextrose 5 % solution, PRN  dextrose bolus 10% 250 mL, PRN  glucose chewable tablet 16 g, PRN        Review of Systems:   14 point ROS obtained but were negative except mentioned in HPI      Physical exam:     Vitals:  /65   Pulse 62   Temp 98.5 °F (36.9 °C) (Oral)   Resp 16   Ht 5' 6\" (1.676 m)   Wt 199 lb 8 oz (90.5 kg)   LMP 03/31/1982   SpO2 94%   BMI 32.20 kg/m²   Constitutional:  OAA X3 NAD  Skin: no rash, turgor wnl  Heent:  eomi, mmm  Neck: no bruits or jvd noted  Cardiovascular:  S1, S2 without m/r/g  Respiratory: dec BS   Abdomen:  +bs, soft, nt, nd  Ext: ++ lower extremity edema  Psychiatric: mood and affect flat   Musculoskeletal:  Rom, muscular strength intact    Data:   Labs:  CBC:   Recent Labs     05/10/22  1000 05/11/22  0541 05/12/22  0729   WBC 4.3 4.2 4.2   HGB 12.6 11.1* 11.8*   PLT 94* 112* 96*     BMP:    Recent Labs     05/10/22  1000 05/11/22  0541 05/12/22  0729    136 135*   K 3.3* 3.3* 4.3    100 103   CO2 24 25 24   BUN 9 7 6*   CREATININE 0.7 0.6 0.6   GLUCOSE 101* 103* 104*     Ca/Mg/Phos:   Recent Labs     05/10/22  1000 05/11/22  0541 05/12/22  0729   CALCIUM 10.1 9.7 9.7   MG 1.30* 2.10  --      Hepatic:   Recent Labs     05/10/22  1000 05/11/22  0541 05/12/22  0729   AST 19 18 18   ALT 9* 7* 6*   BILITOT 1.6* 1.4* 1.4*   ALKPHOS 129 125 138*     Troponin: No results for input(s): TROPONINI in the last 72 hours. BNP: No results for input(s): BNP in the last 72 hours.   Lipids: No results for input(s): CHOL, TRIG, HDL, LDLCALC, LABVLDL in the last 72 hours. ABGs: No results for input(s): PHART, PO2ART, MKI0VXX in the last 72 hours. INR:   No results for input(s): INR in the last 72 hours. UA:  No results for input(s): Elizabeth Harrier, GLUCOSEU, BILIRUBINUR, KETUA, SPECGRAV, BLOODU, PHUR, PROTEINU, UROBILINOGEN, NITRU, LEUKOCYTESUR, Jenny Nunnery in the last 72 hours. Urine Microscopic:   No results for input(s): LABCAST, BACTERIA, COMU, HYALCAST, WBCUA, RBCUA, EPIU in the last 72 hours. Urine Culture:   No results for input(s): LABURIN in the last 72 hours. Urine Chemistry: No results for input(s): Jacquetta Bath, PROTEINUR, NAUR in the last 72 hours. IMAGING:  MRI THORACIC SPINE W WO CONTRAST   Final Result      Discitis and osteomyelitis at T9-10 along with a small epidural abscess/phlegmon and paraspinal inflammatory change. MRI LUMBAR SPINE W WO CONTRAST   Final Result      Discitis and osteomyelitis at T9-10 along with a small epidural abscess/phlegmon and paraspinal inflammatory change. US ABDOMEN LIMITED   Final Result      No ascites identified               CT HEAD WO CONTRAST   Final Result      No acute intracranial pathology                 XR CHEST PORTABLE   Final Result      No acute cardiopulmonary disease. Assessment/Plan   1. AMS     2. Alc cirrhosis     3. Anemia    4. Acid- base/ Electrolyte imbalance     5.  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

## 2022-05-13 LAB
A/G RATIO: 0.8 (ref 1.1–2.2)
ALBUMIN SERPL-MCNC: 3.3 G/DL (ref 3.4–5)
ALP BLD-CCNC: 144 U/L (ref 40–129)
ALT SERPL-CCNC: <5 U/L (ref 10–40)
ANION GAP SERPL CALCULATED.3IONS-SCNC: 8 MMOL/L (ref 3–16)
AST SERPL-CCNC: 18 U/L (ref 15–37)
BASOPHILS ABSOLUTE: 0 K/UL (ref 0–0.2)
BASOPHILS RELATIVE PERCENT: 0.4 %
BILIRUB SERPL-MCNC: 1.4 MG/DL (ref 0–1)
BUN BLDV-MCNC: 6 MG/DL (ref 7–20)
CALCIUM SERPL-MCNC: 9.8 MG/DL (ref 8.3–10.6)
CHLORIDE BLD-SCNC: 100 MMOL/L (ref 99–110)
CO2: 24 MMOL/L (ref 21–32)
CREAT SERPL-MCNC: 0.6 MG/DL (ref 0.6–1.1)
EOSINOPHILS ABSOLUTE: 0.2 K/UL (ref 0–0.6)
EOSINOPHILS RELATIVE PERCENT: 4.9 %
GFR AFRICAN AMERICAN: >60
GFR NON-AFRICAN AMERICAN: >60
GLUCOSE BLD-MCNC: 144 MG/DL (ref 70–99)
GLUCOSE BLD-MCNC: 177 MG/DL (ref 70–99)
GLUCOSE BLD-MCNC: 85 MG/DL (ref 70–99)
GLUCOSE BLD-MCNC: 96 MG/DL (ref 70–99)
GLUCOSE BLD-MCNC: 99 MG/DL (ref 70–99)
HCT VFR BLD CALC: 34.7 % (ref 36–48)
HEMOGLOBIN: 11.6 G/DL (ref 12–16)
LYMPHOCYTES ABSOLUTE: 1 K/UL (ref 1–5.1)
LYMPHOCYTES RELATIVE PERCENT: 22.7 %
MCH RBC QN AUTO: 28.6 PG (ref 26–34)
MCHC RBC AUTO-ENTMCNC: 33.3 G/DL (ref 31–36)
MCV RBC AUTO: 85.7 FL (ref 80–100)
MONOCYTES ABSOLUTE: 0.4 K/UL (ref 0–1.3)
MONOCYTES RELATIVE PERCENT: 10 %
NEUTROPHILS ABSOLUTE: 2.6 K/UL (ref 1.7–7.7)
NEUTROPHILS RELATIVE PERCENT: 62 %
PDW BLD-RTO: 14.6 % (ref 12.4–15.4)
PERFORMED ON: ABNORMAL
PERFORMED ON: ABNORMAL
PERFORMED ON: NORMAL
PERFORMED ON: NORMAL
PLATELET # BLD: 87 K/UL (ref 135–450)
PMV BLD AUTO: 7.6 FL (ref 5–10.5)
POTASSIUM REFLEX MAGNESIUM: 4 MMOL/L (ref 3.5–5.1)
RBC # BLD: 4.04 M/UL (ref 4–5.2)
SODIUM BLD-SCNC: 132 MMOL/L (ref 136–145)
TOTAL PROTEIN: 7.3 G/DL (ref 6.4–8.2)
WBC # BLD: 4.2 K/UL (ref 4–11)

## 2022-05-13 PROCEDURE — 99232 SBSQ HOSP IP/OBS MODERATE 35: CPT | Performed by: INTERNAL MEDICINE

## 2022-05-13 PROCEDURE — 36415 COLL VENOUS BLD VENIPUNCTURE: CPT

## 2022-05-13 PROCEDURE — 6360000002 HC RX W HCPCS: Performed by: INTERNAL MEDICINE

## 2022-05-13 PROCEDURE — 1200000000 HC SEMI PRIVATE

## 2022-05-13 PROCEDURE — 6370000000 HC RX 637 (ALT 250 FOR IP): Performed by: INTERNAL MEDICINE

## 2022-05-13 PROCEDURE — 2580000003 HC RX 258: Performed by: INTERNAL MEDICINE

## 2022-05-13 PROCEDURE — 80053 COMPREHEN METABOLIC PANEL: CPT

## 2022-05-13 PROCEDURE — C9113 INJ PANTOPRAZOLE SODIUM, VIA: HCPCS | Performed by: INTERNAL MEDICINE

## 2022-05-13 PROCEDURE — 85025 COMPLETE CBC W/AUTO DIFF WBC: CPT

## 2022-05-13 RX ORDER — LANOLIN ALCOHOL/MO/W.PET/CERES
6 CREAM (GRAM) TOPICAL NIGHTLY PRN
Status: DISCONTINUED | OUTPATIENT
Start: 2022-05-13 | End: 2022-05-17 | Stop reason: HOSPADM

## 2022-05-13 RX ADMIN — OXYCODONE 10 MG: 5 TABLET ORAL at 15:03

## 2022-05-13 RX ADMIN — INSULIN LISPRO 2 UNITS: 100 INJECTION, SOLUTION INTRAVENOUS; SUBCUTANEOUS at 17:23

## 2022-05-13 RX ADMIN — SODIUM CHLORIDE 20 ML/HR: 9 INJECTION, SOLUTION INTRAVENOUS at 16:19

## 2022-05-13 RX ADMIN — SODIUM CHLORIDE, PRESERVATIVE FREE 10 ML: 5 INJECTION INTRAVENOUS at 09:00

## 2022-05-13 RX ADMIN — METOPROLOL SUCCINATE 25 MG: 25 TABLET, FILM COATED, EXTENDED RELEASE ORAL at 08:57

## 2022-05-13 RX ADMIN — INSULIN LISPRO 1 UNITS: 100 INJECTION, SOLUTION INTRAVENOUS; SUBCUTANEOUS at 22:07

## 2022-05-13 RX ADMIN — SODIUM CHLORIDE, PRESERVATIVE FREE 10 ML: 5 INJECTION INTRAVENOUS at 22:04

## 2022-05-13 RX ADMIN — RIFAXIMIN 550 MG: 550 TABLET ORAL at 08:57

## 2022-05-13 RX ADMIN — OXYCODONE 10 MG: 5 TABLET ORAL at 08:57

## 2022-05-13 RX ADMIN — SODIUM CHLORIDE 25 ML: 9 INJECTION, SOLUTION INTRAVENOUS at 22:04

## 2022-05-13 RX ADMIN — PANTOPRAZOLE SODIUM 40 MG: 40 INJECTION, POWDER, FOR SOLUTION INTRAVENOUS at 08:58

## 2022-05-13 RX ADMIN — Medication 5000 UNITS: at 08:57

## 2022-05-13 RX ADMIN — Medication 6 MG: at 23:48

## 2022-05-13 RX ADMIN — SODIUM CHLORIDE, PRESERVATIVE FREE 10 ML: 5 INJECTION INTRAVENOUS at 20:41

## 2022-05-13 RX ADMIN — ENOXAPARIN SODIUM 40 MG: 100 INJECTION SUBCUTANEOUS at 08:59

## 2022-05-13 RX ADMIN — OXYCODONE 10 MG: 5 TABLET ORAL at 00:14

## 2022-05-13 RX ADMIN — Medication 15 ML: at 09:01

## 2022-05-13 RX ADMIN — PANTOPRAZOLE SODIUM 40 MG: 40 INJECTION, POWDER, FOR SOLUTION INTRAVENOUS at 20:44

## 2022-05-13 RX ADMIN — OXYCODONE 10 MG: 5 TABLET ORAL at 04:34

## 2022-05-13 RX ADMIN — CEFAZOLIN SODIUM 2000 MG: 10 INJECTION, POWDER, FOR SOLUTION INTRAVENOUS at 16:20

## 2022-05-13 RX ADMIN — OXYCODONE 10 MG: 5 TABLET ORAL at 20:50

## 2022-05-13 RX ADMIN — SODIUM CHLORIDE 20 ML/HR: 9 INJECTION, SOLUTION INTRAVENOUS at 07:44

## 2022-05-13 RX ADMIN — CEFAZOLIN SODIUM 2000 MG: 10 INJECTION, POWDER, FOR SOLUTION INTRAVENOUS at 22:05

## 2022-05-13 RX ADMIN — MICONAZOLE NITRATE: 20 CREAM TOPICAL at 09:00

## 2022-05-13 RX ADMIN — CEFAZOLIN SODIUM 2000 MG: 10 INJECTION, POWDER, FOR SOLUTION INTRAVENOUS at 07:45

## 2022-05-13 RX ADMIN — RIFAXIMIN 550 MG: 550 TABLET ORAL at 20:44

## 2022-05-13 ASSESSMENT — PAIN SCALES - GENERAL
PAINLEVEL_OUTOF10: 7
PAINLEVEL_OUTOF10: 9
PAINLEVEL_OUTOF10: 8
PAINLEVEL_OUTOF10: 0
PAINLEVEL_OUTOF10: 8
PAINLEVEL_OUTOF10: 9
PAINLEVEL_OUTOF10: 8
PAINLEVEL_OUTOF10: 9
PAINLEVEL_OUTOF10: 9
PAINLEVEL_OUTOF10: 8

## 2022-05-13 ASSESSMENT — PAIN DESCRIPTION - ORIENTATION
ORIENTATION: LOWER

## 2022-05-13 ASSESSMENT — PAIN DESCRIPTION - FREQUENCY
FREQUENCY: CONTINUOUS

## 2022-05-13 ASSESSMENT — PAIN DESCRIPTION - DESCRIPTORS
DESCRIPTORS: ACHING

## 2022-05-13 ASSESSMENT — PAIN DESCRIPTION - LOCATION
LOCATION: BACK
LOCATION: ABDOMEN
LOCATION: BACK

## 2022-05-13 ASSESSMENT — PAIN - FUNCTIONAL ASSESSMENT
PAIN_FUNCTIONAL_ASSESSMENT: PREVENTS OR INTERFERES WITH MANY ACTIVE NOT PASSIVE ACTIVITIES
PAIN_FUNCTIONAL_ASSESSMENT: PREVENTS OR INTERFERES SOME ACTIVE ACTIVITIES AND ADLS
PAIN_FUNCTIONAL_ASSESSMENT: PREVENTS OR INTERFERES SOME ACTIVE ACTIVITIES AND ADLS

## 2022-05-13 ASSESSMENT — PAIN DESCRIPTION - ONSET
ONSET: ON-GOING

## 2022-05-13 ASSESSMENT — PAIN DESCRIPTION - PAIN TYPE
TYPE: ACUTE PAIN
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
## 2022-05-13 NOTE — PROGRESS NOTES
Comprehensive Nutrition Assessment    RECOMMENDATIONS:  1. PO Diet: Continue regular diet; 1200 ml fluid restriction per MD  2. ONS: Start Magic Cups BID    NUTRITION ASSESSMENT:   Nutritional summary & status: Pt assessed for LOS >7 days. Noted pt anxious and tearful this afternoon, RD did not assess pt in person at this time. Assessment completed through chart review. Poor documentation of po overall through adm but all meals that have been recorded have been 0% intake. No wt taken since 5/5, RD to order updated wt. 30 lb wt loss noted in the last month. Will wait on updated body weight to confirm wt loss. RD sending magic cups to increase po intakes while complying to fluid restriction. Will monitor po intakes and nutrition status throughout adm.     Admission/PMH: MSSA bacteremia with T9-T10 osteomyelitis/discitis with epidural abscess / peptic ulcer disease, alcoholic cirrhosis hep C, DM with neuropathy, CHF     MALNUTRITION ASSESSMENT  Context of Malnutrition: Acute Illness   Malnutrition Status: Insufficient data  Findings of the 6 clinical characteristics of malnutrition (Minimum of 2 out of 6 clinical characteristics is required to make the diagnosis of moderate or severe Protein Calorie Malnutrition based on AND/ASPEN Guidelines):  Energy Intake: Less than/equal to 50% of estimated energy requirements    Energy Intake Time: Greater than or equal to 7 days      NUTRITION DIAGNOSIS   Inadequate oral intake related to acute injury/trauma as evidenced by intake 0-25%    NUTRITION INTERVENTION  Food and/or Nutrient Delivery:  Continue Current Diet,Start Oral Nutrition Supplement  Nutrition Education/Counseling:  No recommendation at this time   Goals:  Pt will improve intakes to meet >75% of nutrition needs during adm         Nutrition Monitoring and Evaluation:   Food/Nutrient Intake Outcomes:  Food and Nutrient Intake,Supplement Intake  Physical Signs/Symptoms Outcomes:  Weight,Biochemical Data     OBJECTIVE DATA: Significant to nutrition assessment  · Nutrition-Focused Physical Findings: lbm 5/12  · Labs: Reviewed; Na 132  · Meds: Reviewed; vitamin D, multivitamin, protonix  · Wounds: None       CURRENT NUTRITION THERAPIES  ADULT DIET; Regular; 1200 ml     PO Intake: 0%   PO Supplement Intake:None Ordered  Additional Sources of Calories/IVF:n/a     ANTHROPOMETRICS  Current Height: 5' 6\" (167.6 cm)  Current Weight: 199 lb 8 oz (90.5 kg)    Admission weight: 201 lb 4.8 oz (91.3 kg)  Ideal Body Weight (IBW): 130 lbs  (59 kg)    Usual Bodyweight  (ml)   Weight Changes RD ordered updated wt to confirm wt loss       BMI: 32.3    Wt Readings from Last 50 Encounters:   05/05/22 199 lb 8 oz (90.5 kg)   04/04/22 229 lb 15 oz (104.3 kg)       COMPARATIVE STANDARDS  Energy (kcal):  7850-1478     Protein (g):  72-83       Fluid (ml/day):  <1200 ml/d per MD    The patient will still be monitored per nutrition standards of care. Consult dietitian if nutrition interventions essential to patient care is needed.      Melissa Medina, [address] [place] [address], [address] [place] [address] Drive:  [phone]  Office:  [phone]

## 2022-05-13 NOTE — PLAN OF CARE
Problem: Discharge Planning  Goal: Discharge to home or other facility with appropriate resources  Note: Case management working on finding a facility in the [facility]. She is in agreement with going to one of their facilities. Problem: Skin/Tissue Integrity  Goal: Absence of new skin breakdown  Description: 1. Monitor for areas of redness and/or skin breakdown  2. Assess vascular access sites hourly  3. Every 4-6 hours minimum:  Change oxygen saturation probe site  4. Every 4-6 hours:  If on nasal continuous positive airway pressure, respiratory therapy assess nares and determine need for appliance change or resting period. Note: Red moist rash to abdominal fold, groin, and yash area. Miconazole cream applied. She repositions on her own. She is incontinent of urine, barrier cream applied after each episode. Problem: Safety - Adult  Goal: Free from fall injury  Note: Bed alarm on and call light in reach. She does not try to get out of bed. She actually refuses to get out of bed. Problem: Chronic Conditions and Co-morbidities  Goal: Patient's chronic conditions and co-morbidity symptoms are monitored and maintained or improved  Note: She is receiving IV antibiotics for spinal infection. Encouraged out of bed, she refuses. Problem: Pain  Goal: Verbalizes/displays adequate comfort level or baseline comfort level  Note: She is taking oxycodone for pain every 4 hours. Pain is always an 8 or 9. Problem: Nutrition Deficit:  Goal: Optimize nutritional status  Note: Patient does not eat, and she barely drinks any water. Encouraged intake.

## 2022-05-13 NOTE — PROGRESS NOTES
ID Follow-up NOTE    CC:   Staphylococcus aureus bacteremia, Thoracic osteo-diskitis  Antibiotics Ancef    Admit Date: 5/5/2022  Hospital Day: 9    Subjective:     Patient c/o LBP, 'middle' of back, no radiation - only wants to lie flat due to pain  No urinary complaints    Objective:     Patient Vitals for the past 8 hrs:   BP Temp Temp src Pulse Resp SpO2   05/13/22 0740 117/66 98.3 °F (36.8 °C) Oral 55 16 96 %   05/13/22 0346 (!) 146/65 98.7 °F (37.1 °C) Oral 84 16 95 %     I/O last 3 completed shifts: In: 480 [P.O.:480]  Out: -   No intake/output data recorded. EXAM:  GENERAL: No apparent distress.   RA  HEENT: Membranes moist, no oral lesion  NECK:  Supple, no lymphadenopathy - trach in place, capped  LUNGS: Clear b/l, no rales, no dullness  CARDIAC: RRR, no murmur appreciated  ABD:  + BS, soft / NT  EXT:  No rash, no edema, no lesions  NEURO: No focal neurologic findings  PSYCH: Orientation, sensorium, mood normal  LINES:  L PICC in place       Data Review:  Lab Results   Component Value Date    WBC 4.2 05/13/2022    HGB 11.6 (L) 05/13/2022    HCT 34.7 (L) 05/13/2022    MCV 85.7 05/13/2022    PLT 87 (L) 05/13/2022     Lab Results   Component Value Date    CREATININE 0.6 05/13/2022    BUN 6 (L) 05/13/2022     (L) 05/13/2022    K 4.0 05/13/2022     05/13/2022    CO2 24 05/13/2022       Hepatic Function Panel:   Lab Results   Component Value Date    ALKPHOS 144 05/13/2022    ALT <5 05/13/2022    AST 18 05/13/2022    PROT 7.3 05/13/2022    BILITOT 1.4 05/13/2022    BILIDIR 0.3 05/05/2022    IBILI 1.2 05/05/2022    LABALBU 3.3 05/13/2022       MICRO:  5/5 BC x 1 S aureus   Antibiotic Interpretation Microscan    clindamycin Sensitive <=0.25 mcg/mL   erythromycin Resistant >=8 mcg/mL   oxacillin Sensitive <=0.25 mcg/mL   tetracycline Sensitive <=1 mcg/mL   trimethoprim-sulfamethoxazole Sensitive <=10 mcg/mL     5/5 Urine cult >100k E coli, K pneumoniae  Klebsiella pneumoniae (2)  Antibiotic Interpretation Microscan    amoxicillin-clavulanate Sensitive <=8/4 mcg/mL   ampicillin Resistant >16 mcg/mL   ampicillin-sulbactam Sensitive <=8/4 mcg/mL   ceFAZolin Sensitive <=2 mcg/mL   cefepime Sensitive <=2 mcg/mL   cefTRIAXone Sensitive <=1 mcg/mL   cefuroxime Intermediate 16 mcg/mL   ciprofloxacin Sensitive <=1 mcg/mL   ertapenem Sensitive <=0.5 mcg/mL   gentamicin Sensitive <=4 mcg/mL   meropenem Sensitive <=1 mcg/mL   nitrofurantoin Resistant >64 mcg/mL   piperacillin-tazobactam Sensitive <=16 mcg/mL   trimethoprim-sulfamethoxazole Sensitive <=2/38 mcg/mL     Escherichia coli (1)  Antibiotic Interpretation Microscan    ampicillin Sensitive <=8 mcg/mL   ampicillin-sulbactam Sensitive <=8/4 mcg/mL   ceFAZolin Sensitive <=2 mcg/mL   cefepime Sensitive <=2 mcg/mL   cefTRIAXone Sensitive <=1 mcg/mL   cefuroxime Sensitive 8 mcg/mL   ciprofloxacin Sensitive <=1 mcg/mL   ertapenem Sensitive <=0.5 mcg/mL   gentamicin Sensitive <=4 mcg/mL   meropenem Sensitive <=1 mcg/mL   nitrofurantoin Sensitive <=32 mcg/mL   piperacillin-tazobactam Sensitive <=16 mcg/mL   trimethoprim-sulfamethoxazole Sensitive <=2/38 mcg/mL      BC x 2 no growth     IMAGIN/9 Lumbar MRI w/wo contrast  Discitis and osteomyelitis at T9-10 along with a small epidural abscess/phlegmon and paraspinal inflammatory change     Thoracic MRI w/wo contrast  Discitis and osteomyelitis at T9-10 along with a small epidural abscess/phlegmon and paraspinal inflammatory       Scheduled Meds:   sodium chloride flush  5-40 mL IntraVENous 2 times per day    ceFAZolin  2,000 mg IntraVENous Q8H    lactulose  10 g Oral Daily    rifAXIMin  550 mg Oral BID    miconazole   Topical BID    lidocaine  1 patch TransDERmal Daily    metoprolol succinate  25 mg Oral Daily    CENTRUM/CERTA-MARJAN with minerals oral  15 mL Oral Daily    vitamin D  5,000 Units Oral Daily    sodium chloride flush  5-40 mL IntraVENous 2 times per day    enoxaparin  40 mg SubCUTAneous Daily    pantoprazole  40 mg IntraVENous BID    insulin lispro  0-12 Units SubCUTAneous TID     insulin lispro  0-6 Units SubCUTAneous Nightly       Continuous Infusions:   sodium chloride 25 mL (05/10/22 1339)    sodium chloride 20 mL/hr (05/13/22 0708)    dextrose         PRN Meds:  sodium chloride flush, sodium chloride, calcium carbonate, oxyCODONE, ipratropium-albuterol, sodium chloride flush, sodium chloride, ondansetron **OR** ondansetron, polyethylene glycol, glucagon (rDNA), dextrose, dextrose bolus, glucose      Assessment:     Hx DM, cirrhosis  Hx severe COVID-19 pneumonia with resp failure, trach  Lives at nursing facility    Encephalopathy     MSSA bacteremia, f/u BC no growth  T9/10 osteo-diskitis - reviewed MRI 5/9    UTI vs bacteriuria - urine cult - E coli, Klebsiella (both S cefazolin)    Plan:     Cont ancef   See SAVANNAH - if goes home, can give ceftriaxone which is one a day    Medical Decision Making:   The following items were considered in medical decision making:  Discussion of patient care with other providers  Reviewed clinical lab tests  Reviewed radiology tests  Reviewed other diagnostic tests/interventions  Independent review of radiologic images - reviewed MRI with Radiologist 5/9  Microbiology cultures and other micro tests reviewed      Discussed with pt  Benjamin Guzman MD    INFUSION ORDERS: modified 5/13  Ceftriaxone 2 gm iv daily through 6/22  - Diagnosis - S aureus bacteremia, thoracic diskitis / vertebral osrteomyelitis  - First dose given in hospital  - PICC   - Disposition / date discharge  - Check CBC w diff, CMP, ESR, CRP every Mon or [address] result to [phone]  - Call with antibiotic / infusion issues, [phone]  - Call with any change in status, transfer in or out of a facility or to hospital - [phone]  - No f/u in outpatient ID office necessary

## 2022-05-13 NOTE — PLAN OF CARE
Patient anxious and tearful. \" There's too much going on\". Emotional support provided. Dr Darien Cornell aware.

## 2022-05-13 NOTE — PROGRESS NOTES
HOSPITALISTS PROGRESS NOTE    5/13/2022 1:28 PM        Name: Mishel Asif Admitted: 5/5/2022  Primary Care Provider: No primary care provider on file. (Tel: None)      Problem List  Principal Problem:    Acute metabolic encephalopathy  Active Problems:    Hyperammonemia (HCC)    Hyponatremia    Azotemia    MALENA (acute kidney injury) ([facility] [address]    Altered mental status  Resolved Problems:    * No resolved hospital problems. *       Assessment & Plan:   MSSA bacteremia with T9-T10 osteomyelitis/discitis with epidural abscess  Echo negative for vegetation  PICC placed, on Ancef, ID input noticed    Bacteriuria versus UTI  Ancef will cover discussed with ID    MALENA improved  Continue to hold spironolactone and torsemide on discharge for now    Peptic ulcer disease  March EGD showing 3 clean-based antral ulcers and duodenal ulcers continue on Protonix twice daily on discharge      Alcoholic cirrhosis hepatitis C antibody negative hepatitis B negative  On lactulose and rifaximin    Diabetes mellitus with neuropathy  Chronic diastolic heart failure  Bipolar on Seroquel        IV Access: Peripheral  Briggs: No  Diet: ADULT DIET; Regular; 1200 ml  Code:Full Code  DVT PPX Heparin  Disposition await placement, patient is medically stable for discharge,    Brief Course:     CC: Bacteremia    Subjective:  .   No new issues, patient tearful at times, does complain of back pain,    Reviewed interval ancillary notes    Current Medications  sodium chloride flush 0.9 % injection 5-40 mL, 2 times per day  sodium chloride flush 0.9 % injection 5-40 mL, PRN  0.9 % sodium chloride infusion, PRN  ceFAZolin (ANCEF) 2000 mg in dextrose 5 % 50 mL IVPB, Q8H  calcium carbonate (TUMS) chewable tablet 500 mg, TID PRN  oxyCODONE (ROXICODONE) immediate release tablet 10 mg, Q4H PRN  lactulose (CHRONULAC) 10 GM/15ML solution 10 g, Daily  rifAXIMin (XIFAXAN) tablet 550 mg, BID  miconazole (MICOTIN) 2 % cream, BID  lidocaine 4 % external patch 1 patch, Daily  ipratropium-albuterol (DUONEB) nebulizer solution 3 mL, Q4H PRN  metoprolol succinate (TOPROL XL) extended release tablet 25 mg, Daily  CENTRUM/CERTA-MARJAN with minerals oral solution 15 mL, Daily  vitamin D (CHOLECALCIFEROL) capsule 5,000 Units, Daily  sodium chloride flush 0.9 % injection 5-40 mL, 2 times per day  sodium chloride flush 0.9 % injection 5-40 mL, PRN  0.9 % sodium chloride infusion, PRN  enoxaparin (LOVENOX) injection 40 mg, Daily  ondansetron (ZOFRAN-ODT) disintegrating tablet 4 mg, Q8H PRN   Or  ondansetron (ZOFRAN) injection 4 mg, Q6H PRN  polyethylene glycol (GLYCOLAX) packet 17 g, Daily PRN  pantoprazole (PROTONIX) injection 40 mg, BID  insulin lispro (1 Unit Dial) 0-12 Units, TID WC  insulin lispro (1 Unit Dial) 0-6 Units, Nightly  glucagon (rDNA) injection 1 mg, PRN  dextrose 5 % solution, PRN  dextrose bolus 10% 250 mL, PRN  glucose chewable tablet 16 g, PRN        Objective:  /77   Pulse 56   Temp 98.5 °F (36.9 °C) (Oral)   Resp 16   Ht 5' 6\" (1.676 m)   Wt 199 lb 8 oz (90.5 kg)   LMP 03/31/1982   SpO2 97%   BMI 32.20 kg/m²     Intake/Output Summary (Last 24 hours) at 5/13/2022 1328  Last data filed at 5/13/2022 0856  Gross per 24 hour   Intake 0 ml   Output --   Net 0 ml      Wt Readings from Last 3 Encounters:   05/05/22 199 lb 8 oz (90.5 kg)   04/04/22 229 lb 15 oz (104.3 kg)     Low back tenderness severe  General appearance:  Appears comfortable  Eyes: Sclera clear. Pupils equal.  ENT: Moist oral mucosa. Trachea midline, no adenopathy. Cardiovascular: Regular rhythm, normal S1, S2. No murmur. No edema in lower extremities  Respiratory: Not using accessory muscles. Good inspiratory effort. Clear to auscultation bilaterally, no wheeze or crackles.    GI: Abdomen soft, no tenderness, not distended, normal bowel sounds  Musculoskeletal: No cyanosis in digits, neck supple  Neurology: CN 2-12 grossly intact. No speech or motor deficits  Psych: Normal affect. Alert and oriented in time, place and person  Skin: Warm, dry, normal turgor  Extremity exam shows brisk capillary refill. Peripheral pulses are palpable in lower extremities     Labs and Tests:  CBC:   Recent Labs     05/11/22 0541 05/12/22  0729 05/13/22  0837   WBC 4.2 4.2 4.2   HGB 11.1* 11.8* 11.6*   * 96* 87*     BMP:    Recent Labs     05/11/22 0541 05/12/22  0729 05/13/22  0837    135* 132*   K 3.3* 4.3 4.0    103 100   CO2 25 24 24   BUN 7 6* 6*   CREATININE 0.6 0.6 0.6   GLUCOSE 103* 104* 99     Hepatic:   Recent Labs     05/11/22 0541 05/12/22  0729 05/13/22  0837   AST 18 18 18   ALT 7* 6* <5*   BILITOT 1.4* 1.4* 1.4*   ALKPHOS 125 138* 144*     MRI THORACIC SPINE W WO CONTRAST   Final Result      Discitis and osteomyelitis at T9-10 along with a small epidural abscess/phlegmon and paraspinal inflammatory change. MRI LUMBAR SPINE W WO CONTRAST   Final Result      Discitis and osteomyelitis at T9-10 along with a small epidural abscess/phlegmon and paraspinal inflammatory change. US ABDOMEN LIMITED   Final Result      No ascites identified               CT HEAD WO CONTRAST   Final Result      No acute intracranial pathology                 XR CHEST PORTABLE   Final Result      No acute cardiopulmonary disease.              Sabra Ricketts MD   5/13/2022 1:28 PM

## 2022-05-14 LAB
A/G RATIO: 0.9 (ref 1.1–2.2)
ALBUMIN SERPL-MCNC: 3.3 G/DL (ref 3.4–5)
ALP BLD-CCNC: 154 U/L (ref 40–129)
ALT SERPL-CCNC: <5 U/L (ref 10–40)
ANION GAP SERPL CALCULATED.3IONS-SCNC: 9 MMOL/L (ref 3–16)
AST SERPL-CCNC: 22 U/L (ref 15–37)
BASOPHILS ABSOLUTE: 0 K/UL (ref 0–0.2)
BASOPHILS RELATIVE PERCENT: 0.8 %
BILIRUB SERPL-MCNC: 1.2 MG/DL (ref 0–1)
BUN BLDV-MCNC: 6 MG/DL (ref 7–20)
CALCIUM SERPL-MCNC: 9.5 MG/DL (ref 8.3–10.6)
CHLORIDE BLD-SCNC: 102 MMOL/L (ref 99–110)
CO2: 24 MMOL/L (ref 21–32)
CREAT SERPL-MCNC: 0.5 MG/DL (ref 0.6–1.1)
EOSINOPHILS ABSOLUTE: 0.2 K/UL (ref 0–0.6)
EOSINOPHILS RELATIVE PERCENT: 4.3 %
GFR AFRICAN AMERICAN: >60
GFR NON-AFRICAN AMERICAN: >60
GLUCOSE BLD-MCNC: 103 MG/DL (ref 70–99)
GLUCOSE BLD-MCNC: 182 MG/DL (ref 70–99)
GLUCOSE BLD-MCNC: 79 MG/DL (ref 70–99)
GLUCOSE BLD-MCNC: 96 MG/DL (ref 70–99)
GLUCOSE BLD-MCNC: 99 MG/DL (ref 70–99)
HCT VFR BLD CALC: 34.8 % (ref 36–48)
HEMOGLOBIN: 11.4 G/DL (ref 12–16)
LYMPHOCYTES ABSOLUTE: 1 K/UL (ref 1–5.1)
LYMPHOCYTES RELATIVE PERCENT: 22.5 %
MCH RBC QN AUTO: 28.3 PG (ref 26–34)
MCHC RBC AUTO-ENTMCNC: 32.6 G/DL (ref 31–36)
MCV RBC AUTO: 86.6 FL (ref 80–100)
MONOCYTES ABSOLUTE: 0.5 K/UL (ref 0–1.3)
MONOCYTES RELATIVE PERCENT: 11.7 %
NEUTROPHILS ABSOLUTE: 2.8 K/UL (ref 1.7–7.7)
NEUTROPHILS RELATIVE PERCENT: 60.7 %
PDW BLD-RTO: 14.7 % (ref 12.4–15.4)
PERFORMED ON: ABNORMAL
PERFORMED ON: NORMAL
PLATELET # BLD: 87 K/UL (ref 135–450)
PMV BLD AUTO: 7.7 FL (ref 5–10.5)
POTASSIUM REFLEX MAGNESIUM: 3.7 MMOL/L (ref 3.5–5.1)
RBC # BLD: 4.02 M/UL (ref 4–5.2)
SODIUM BLD-SCNC: 135 MMOL/L (ref 136–145)
TOTAL PROTEIN: 7.1 G/DL (ref 6.4–8.2)
WBC # BLD: 4.6 K/UL (ref 4–11)

## 2022-05-14 PROCEDURE — 36592 COLLECT BLOOD FROM PICC: CPT

## 2022-05-14 PROCEDURE — C9113 INJ PANTOPRAZOLE SODIUM, VIA: HCPCS | Performed by: INTERNAL MEDICINE

## 2022-05-14 PROCEDURE — 2580000003 HC RX 258: Performed by: INTERNAL MEDICINE

## 2022-05-14 PROCEDURE — 6370000000 HC RX 637 (ALT 250 FOR IP): Performed by: INTERNAL MEDICINE

## 2022-05-14 PROCEDURE — 99232 SBSQ HOSP IP/OBS MODERATE 35: CPT | Performed by: HOSPITALIST

## 2022-05-14 PROCEDURE — 80053 COMPREHEN METABOLIC PANEL: CPT

## 2022-05-14 PROCEDURE — 6360000002 HC RX W HCPCS: Performed by: INTERNAL MEDICINE

## 2022-05-14 PROCEDURE — 85025 COMPLETE CBC W/AUTO DIFF WBC: CPT

## 2022-05-14 PROCEDURE — 1200000000 HC SEMI PRIVATE

## 2022-05-14 RX ORDER — DIPHENHYDRAMINE HCL 25 MG
25 TABLET ORAL ONCE
Status: COMPLETED | OUTPATIENT
Start: 2022-05-14 | End: 2022-05-14

## 2022-05-14 RX ORDER — IODINE/SODIUM IODIDE 2 %
TINCTURE TOPICAL PRN
Status: DISCONTINUED | OUTPATIENT
Start: 2022-05-14 | End: 2022-05-17 | Stop reason: HOSPADM

## 2022-05-14 RX ADMIN — OXYCODONE 10 MG: 5 TABLET ORAL at 04:47

## 2022-05-14 RX ADMIN — OXYCODONE 10 MG: 5 TABLET ORAL at 21:24

## 2022-05-14 RX ADMIN — SODIUM CHLORIDE, PRESERVATIVE FREE 10 ML: 5 INJECTION INTRAVENOUS at 08:24

## 2022-05-14 RX ADMIN — CEFAZOLIN SODIUM 2000 MG: 10 INJECTION, POWDER, FOR SOLUTION INTRAVENOUS at 23:25

## 2022-05-14 RX ADMIN — DIPHENHYDRAMINE HCL 25 MG: 25 TABLET ORAL at 00:44

## 2022-05-14 RX ADMIN — SODIUM CHLORIDE, PRESERVATIVE FREE 10 ML: 5 INJECTION INTRAVENOUS at 23:27

## 2022-05-14 RX ADMIN — Medication 15 ML: at 08:24

## 2022-05-14 RX ADMIN — OXYCODONE 10 MG: 5 TABLET ORAL at 15:11

## 2022-05-14 RX ADMIN — CEFAZOLIN SODIUM 2000 MG: 10 INJECTION, POWDER, FOR SOLUTION INTRAVENOUS at 15:42

## 2022-05-14 RX ADMIN — OXYCODONE 10 MG: 5 TABLET ORAL at 09:58

## 2022-05-14 RX ADMIN — OXYCODONE 10 MG: 5 TABLET ORAL at 00:44

## 2022-05-14 RX ADMIN — PANTOPRAZOLE SODIUM 40 MG: 40 INJECTION, POWDER, FOR SOLUTION INTRAVENOUS at 08:22

## 2022-05-14 RX ADMIN — RIFAXIMIN 550 MG: 550 TABLET ORAL at 08:22

## 2022-05-14 RX ADMIN — INSULIN LISPRO 2 UNITS: 100 INJECTION, SOLUTION INTRAVENOUS; SUBCUTANEOUS at 08:03

## 2022-05-14 RX ADMIN — SODIUM CHLORIDE, PRESERVATIVE FREE 10 ML: 5 INJECTION INTRAVENOUS at 22:21

## 2022-05-14 RX ADMIN — SODIUM CHLORIDE, PRESERVATIVE FREE 10 ML: 5 INJECTION INTRAVENOUS at 04:47

## 2022-05-14 RX ADMIN — SODIUM CHLORIDE, PRESERVATIVE FREE 10 ML: 5 INJECTION INTRAVENOUS at 08:25

## 2022-05-14 RX ADMIN — SODIUM CHLORIDE 25 ML: 9 INJECTION, SOLUTION INTRAVENOUS at 06:21

## 2022-05-14 RX ADMIN — MICONAZOLE NITRATE: 20 CREAM TOPICAL at 08:26

## 2022-05-14 RX ADMIN — ONDANSETRON 4 MG: 2 INJECTION INTRAMUSCULAR; INTRAVENOUS at 21:20

## 2022-05-14 RX ADMIN — Medication 6 MG: at 21:24

## 2022-05-14 RX ADMIN — PANTOPRAZOLE SODIUM 40 MG: 40 INJECTION, POWDER, FOR SOLUTION INTRAVENOUS at 22:20

## 2022-05-14 RX ADMIN — DIPHENHYDRAMINE HCL 25 MG: 25 TABLET ORAL at 06:20

## 2022-05-14 RX ADMIN — RIFAXIMIN 550 MG: 550 TABLET ORAL at 21:24

## 2022-05-14 RX ADMIN — CEFAZOLIN SODIUM 2000 MG: 10 INJECTION, POWDER, FOR SOLUTION INTRAVENOUS at 06:23

## 2022-05-14 RX ADMIN — LACTULOSE 10 G: 20 SOLUTION ORAL at 08:25

## 2022-05-14 RX ADMIN — MICONAZOLE NITRATE: 20 CREAM TOPICAL at 21:14

## 2022-05-14 RX ADMIN — Medication 5000 UNITS: at 08:22

## 2022-05-14 ASSESSMENT — PAIN DESCRIPTION - LOCATION
LOCATION: BACK

## 2022-05-14 ASSESSMENT — PAIN SCALES - GENERAL
PAINLEVEL_OUTOF10: 8
PAINLEVEL_OUTOF10: 0
PAINLEVEL_OUTOF10: 9
PAINLEVEL_OUTOF10: 8
PAINLEVEL_OUTOF10: 0
PAINLEVEL_OUTOF10: 6

## 2022-05-14 ASSESSMENT — PAIN DESCRIPTION - ORIENTATION
ORIENTATION: LOWER

## 2022-05-14 ASSESSMENT — PAIN DESCRIPTION - DESCRIPTORS
DESCRIPTORS: ACHING

## 2022-05-14 ASSESSMENT — PAIN DESCRIPTION - PAIN TYPE
TYPE: ACUTE PAIN
TYPE: ACUTE PAIN

## 2022-05-14 ASSESSMENT — PAIN DESCRIPTION - ONSET
ONSET: ON-GOING
ONSET: ON-GOING

## 2022-05-14 ASSESSMENT — PAIN - FUNCTIONAL ASSESSMENT
PAIN_FUNCTIONAL_ASSESSMENT: PREVENTS OR INTERFERES WITH ALL ACTIVE AND SOME PASSIVE ACTIVITIES
PAIN_FUNCTIONAL_ASSESSMENT: PREVENTS OR INTERFERES WITH ALL ACTIVE AND SOME PASSIVE ACTIVITIES

## 2022-05-14 ASSESSMENT — PAIN DESCRIPTION - FREQUENCY
FREQUENCY: CONTINUOUS
FREQUENCY: CONTINUOUS

## 2022-05-14 NOTE — PROGRESS NOTES
Pt with trach and speaking valve in place. Trach care done, inner cannula and trach tie  Replaced. Back up trach and ambu bag placed in room. Pt on room air.  spo2 94%

## 2022-05-14 NOTE — PROGRESS NOTES
Patient alert and oriented x4 and has c/o of some pain and weakness. Patient refusing to ambulate to bathroom. Pain managed with medication and no other complaints at this time. Will continue to monitor.

## 2022-05-14 NOTE — PROGRESS NOTES
Nephrology  Note                                                                                                                                                                                                                                                                                                                                                               Office : [phone]     Fax :[phone]              Patient's Name: NITA Etienne    Good uO   Cr better   No N/V/D   Na low       Past Medical History:   Diagnosis Date    CHF (congestive heart failure) ([place] [address]     Hyperlipidemia     Obesity     Tracheostomy in place [facility])        Past Surgical History:   Procedure Laterality Date    UPPER GASTROINTESTINAL ENDOSCOPY N/A 3/31/2022    EGD BIOPSY performed by Saida Thomas MD at [facility] reviewed. No pertinent family history. reports that she has quit smoking. She has never used smokeless tobacco. She reports previous alcohol use. She reports that she does not use drugs. Allergies:  Patient has no known allergies.     Current Medications:    melatonin tablet 6 mg, Nightly PRN  sodium chloride flush 0.9 % injection 5-40 mL, 2 times per day  sodium chloride flush 0.9 % injection 5-40 mL, PRN  0.9 % sodium chloride infusion, PRN  ceFAZolin (ANCEF) 2000 mg in dextrose 5 % 50 mL IVPB, Q8H  calcium carbonate (TUMS) chewable tablet 500 mg, TID PRN  oxyCODONE (ROXICODONE) immediate release tablet 10 mg, Q4H PRN  lactulose (CHRONULAC) 10 GM/15ML solution 10 g, Daily  rifAXIMin (XIFAXAN) tablet 550 mg, BID  miconazole (MICOTIN) 2 % cream, BID  lidocaine 4 % external patch 1 patch, Daily  ipratropium-albuterol (DUONEB) nebulizer solution 3 mL, Q4H PRN  metoprolol succinate (TOPROL XL) extended release tablet 25 mg, Daily  CENTRUM/CERTA-MARJAN with minerals oral solution 15 mL, Daily  vitamin D (CHOLECALCIFEROL) capsule 5,000 Units, Daily  sodium chloride flush 0.9 % injection 5-40 mL, 2 times per day  sodium chloride flush 0.9 % injection 5-40 mL, PRN  0.9 % sodium chloride infusion, PRN  enoxaparin (LOVENOX) injection 40 mg, Daily  ondansetron (ZOFRAN-ODT) disintegrating tablet 4 mg, Q8H PRN   Or  ondansetron (ZOFRAN) injection 4 mg, Q6H PRN  polyethylene glycol (GLYCOLAX) packet 17 g, Daily PRN  pantoprazole (PROTONIX) injection 40 mg, BID  insulin lispro (1 Unit Dial) 0-12 Units, TID WC  insulin lispro (1 Unit Dial) 0-6 Units, Nightly  glucagon (rDNA) injection 1 mg, PRN  dextrose 5 % solution, PRN  dextrose bolus 10% 250 mL, PRN  glucose chewable tablet 16 g, PRN        Review of Systems:   14 point ROS obtained but were negative except mentioned in HPI      Physical exam:     Vitals:  /67   Pulse 66   Temp 98.8 °F (37.1 °C) (Oral)   Resp 18   Ht 5' 6\" (1.676 m)   Wt 199 lb 8 oz (90.5 kg)   LMP 03/31/1982   SpO2 95%   BMI 32.20 kg/m²   Constitutional:  OAA X3 NAD  Skin: no rash, turgor wnl  Heent:  eomi, mmm  Neck: no bruits or jvd noted  Cardiovascular:  S1, S2 without m/r/g  Respiratory: dec BS   Abdomen:  +bs, soft, nt, nd  Ext: ++ lower extremity edema  Psychiatric: mood and affect flat   Musculoskeletal:  Rom, muscular strength intact    Data:   Labs:  CBC:   Recent Labs     05/11/22  0541 05/12/22  0729 05/13/22  0837   WBC 4.2 4.2 4.2   HGB 11.1* 11.8* 11.6*   * 96* 87*     BMP:    Recent Labs     05/11/22  0541 05/12/22  0729 05/13/22  0837    135* 132*   K 3.3* 4.3 4.0    103 100   CO2 25 24 24   BUN 7 6* 6*   CREATININE 0.6 0.6 0.6   GLUCOSE 103* 104* 99     Ca/Mg/Phos:   Recent Labs     05/11/22  0541 05/12/22  0729 05/13/22  0837   CALCIUM 9.7 9.7 9.8   MG 2.10  --   --      Hepatic:   Recent Labs     05/11/22  0541 05/12/22  0729 05/13/22  0837   AST 18 18 18   ALT 7* 6* <5*   BILITOT 1.4* 1.4* 1.4*   ALKPHOS 125 138* 144*     Troponin: No results for input(s): TROPONINI in the last 72 hours.   BNP: No results for input(s): BNP in the last 72 hours. Lipids: No results for input(s): CHOL, TRIG, HDL, LDLCALC, LABVLDL in the last 72 hours. ABGs: No results for input(s): PHART, PO2ART, DND5RLH in the last 72 hours. INR:   No results for input(s): INR in the last 72 hours. UA:  No results for input(s): [place], GLUCOSEU, BILIRUBINUR, KETUA, SPECGRAV, BLOODU, PHUR, PROTEINU, UROBILINOGEN, NITRU, LEUKOCYTESUR, Edna Temple in the last 72 hours. Urine Microscopic:   No results for input(s): LABCAST, BACTERIA, COMU, HYALCAST, WBCUA, RBCUA, EPIU in the last 72 hours. Urine Culture:   No results for input(s): LABURIN in the last 72 hours. Urine Chemistry: No results for input(s): Lauren Parody, PROTEINUR, NAUR in the last 72 hours. IMAGING:  MRI THORACIC SPINE W WO CONTRAST   Final Result      Discitis and osteomyelitis at T9-10 along with a small epidural abscess/phlegmon and paraspinal inflammatory change. MRI LUMBAR SPINE W WO CONTRAST   Final Result      Discitis and osteomyelitis at T9-10 along with a small epidural abscess/phlegmon and paraspinal inflammatory change. US ABDOMEN LIMITED   Final Result      No ascites identified               CT HEAD WO CONTRAST   Final Result      No acute intracranial pathology                 XR CHEST PORTABLE   Final Result      No acute cardiopulmonary disease. Assessment/Plan   1. AMS     2. Alc cirrhosis     3. Anemia    4. Acid- base/ Electrolyte imbalance     5.  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

## 2022-05-14 NOTE — PROGRESS NOTES
Nephrology  Note                                                                                                                                                                                                                                                                                                                                                               Office : [phone]     Fax :[phone]              Patient's Name: NITA ALLEN Rehfues    Good uO   Cr better   No N/V/D   Feels better      Past Medical History:   Diagnosis Date    CHF (congestive heart failure) ([address]     Hyperlipidemia     Obesity     Tr[facility] in [facility])        Past Surgical History:   Procedure Laterality Date    UPPER GASTROINTESTINAL ENDOSCOPY N/A 3/31/2022    EGD BIOPSY performed by Blu Macias MD at [facility] reviewed. No pertinent family history. reports that she has quit smoking. She has never used smokeless tobacco. She reports previous alcohol use. She reports that she does not use drugs. Allergies:  Patient has no known allergies.     Current Medications:    melatonin tablet 6 mg, Nightly PRN  sodium chloride flush 0.9 % injection 5-40 mL, 2 times per day  sodium chloride flush 0.9 % injection 5-40 mL, PRN  0.9 % sodium chloride infusion, PRN  ceFAZolin (ANCEF) 2000 mg in dextrose 5 % 50 mL IVPB, Q8H  calcium carbonate (TUMS) chewable tablet 500 mg, TID PRN  oxyCODONE (ROXICODONE) immediate release tablet 10 mg, Q4H PRN  lactulose (CHRONULAC) 10 GM/15ML solution 10 g, Daily  rifAXIMin (XIFAXAN) tablet 550 mg, BID  miconazole (MICOTIN) 2 % cream, BID  lidocaine 4 % external patch 1 patch, Daily  ipratropium-albuterol (DUONEB) nebulizer solution 3 mL, Q4H PRN  metoprolol succinate (TOPROL XL) extended release tablet 25 mg, Daily  CENTRUM/CERTA-MARJAN with minerals oral solution 15 mL, Daily  vitamin D (CHOLECALCIFEROL) capsule 5,000 Units, Daily  sodium chloride flush 0.9 % injection 5-40 mL, 2 times per day  sodium chloride flush 0.9 % injection 5-40 mL, PRN  0.9 % sodium chloride infusion, PRN  enoxaparin (LOVENOX) injection 40 mg, Daily  ondansetron (ZOFRAN-ODT) disintegrating tablet 4 mg, Q8H PRN   Or  ondansetron (ZOFRAN) injection 4 mg, Q6H PRN  polyethylene glycol (GLYCOLAX) packet 17 g, Daily PRN  pantoprazole (PROTONIX) injection 40 mg, BID  insulin lispro (1 Unit Dial) 0-12 Units, TID WC  insulin lispro (1 Unit Dial) 0-6 Units, Nightly  glucagon (rDNA) injection 1 mg, PRN  dextrose 5 % solution, PRN  dextrose bolus 10% 250 mL, PRN  glucose chewable tablet 16 g, PRN          Physical exam:     Vitals:  /72   Pulse 63   Temp 97.3 °F (36.3 °C) (Oral)   Resp 18   Ht 5' 6\" (1.676 m)   Wt 199 lb 8 oz (90.5 kg)   LMP 03/31/1982   SpO2 94%   BMI 32.20 kg/m²   Constitutional:  OAA X3 NAD  Skin: no rash, turgor wnl  Heent:  eomi, mmm  Neck: no bruits or jvd noted  Cardiovascular:  S1, S2 without m/r/g  Respiratory: dec BS   Abdomen:  +bs, soft, nt, nd  Ext: trace lower extremity edema      Data:   Labs:  CBC:   Recent Labs     05/12/22  0729 05/13/22  0837 05/14/22  0504   WBC 4.2 4.2 4.6   HGB 11.8* 11.6* 11.4*   PLT 96* 87* 87*     BMP:    Recent Labs     05/12/22  0729 05/13/22  0837 05/14/22  0504   * 132* 135*   K 4.3 4.0 3.7    100 102   CO2 24 24 24   BUN 6* 6* 6*   CREATININE 0.6 0.6 0.5*   GLUCOSE 104* 99 103*     Ca/Mg/Phos:   Recent Labs     05/12/22  0729 05/13/22  0837 05/14/22  0504   CALCIUM 9.7 9.8 9.5     Hepatic:   Recent Labs     05/12/22  0729 05/13/22  0837 05/14/22  0504   AST 18 18 22   ALT 6* <5* <5*   BILITOT 1.4* 1.4* 1.2*   ALKPHOS 138* 144* 154*     Troponin: No results for input(s): TROPONINI in the last 72 hours. BNP: No results for input(s): BNP in the last 72 hours. Lipids: No results for input(s): CHOL, TRIG, HDL, LDLCALC, LABVLDL in the last 72 hours. ABGs: No results for input(s): PHART, PO2ART, DOC1JEB in the last 72 hours.   INR:   No results for input(s): INR in the last 72 hours. UA:  No results for input(s): Chace Kranthi, GLUCOSEU, BILIRUBINUR, KETUA, SPECGRAV, BLOODU, PHUR, PROTEINU, UROBILINOGEN, NITRU, LEUKOCYTESUR, Gae Jassi in the last 72 hours. Urine Microscopic:   No results for input(s): LABCAST, BACTERIA, COMU, HYALCAST, WBCUA, RBCUA, EPIU in the last 72 hours. Urine Culture:   No results for input(s): LABURIN in the last 72 hours. Urine Chemistry: No results for input(s): Elsy Abelson, PROTEINUR, NAUR in the last 72 hours. IMAGING:  MRI THORACIC SPINE W WO CONTRAST   Final Result      Discitis and osteomyelitis at T9-10 along with a small epidural abscess/phlegmon and paraspinal inflammatory change. MRI LUMBAR SPINE W WO CONTRAST   Final Result      Discitis and osteomyelitis at T9-10 along with a small epidural abscess/phlegmon and paraspinal inflammatory change. US ABDOMEN LIMITED   Final Result      No ascites identified               CT HEAD WO CONTRAST   Final Result      No acute intracranial pathology                 XR CHEST PORTABLE   Final Result      No acute cardiopulmonary disease. Assessment/Plan   1. Encephalopathy  resolved    2. Alc cirrhosis     3. Anemia    4. Acid- base/ Electrolyte imbalance     5.  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

## 2022-05-14 NOTE — PROGRESS NOTES
HOSPITALISTS PROGRESS NOTE    5/14/2022 8:38 AM        Name: Princess Rutledge . Admitted: 5/5/2022  Primary Care Provider: ANNIA Wood CNP (Tel: [phone])      Problem List  Principal Problem:    Acute metabolic encephalopathy  Active Problems:    Hyperammonemia (HCC)    Hyponatremia    Azotemia    [facility]    Altered mental status  Resolved Problems:    * No resolved hospital problems. *       Assessment & Plan:   MSSA bacteremia with T9-T10 osteomyelitis/discitis with epidural abscess  Echo negative for vegetation  PICC placed, on Ancef, ID input noticed    Bacteriuria versus UTI  Ancef will cover discussed with ID    MALENA improved  Continue to hold spironolactone and torsemide on discharge for now    Peptic ulcer disease  March EGD showing 3 clean-based antral ulcers and duodenal ulcers continue on Protonix twice daily on discharge      Alcoholic cirrhosis hepatitis C antibody negative hepatitis B negative  On lactulose and rifaximin    Diabetes mellitus with neuropathy  Chronic diastolic heart failure  Bipolar on Seroquel    Itching   Calamine lotion        IV Access: Peripheral  Briggs: No  Diet: ADULT DIET; Regular; 1200 ml  ADULT ORAL NUTRITION SUPPLEMENT; Lunch, Dinner; Frozen Oral Supplement  Code:Full Code  DVT PPX Heparin  Disposition await placement, patient is medically stable for discharge,    Brief Course:     CC: Bacteremia    Subjective:  .   C/o itching  Slightly low blood pressure in the morning   Awaiting placement     Reviewed interval ancillary notes    Current Medications  Calamine 8-8 % lotion, PRN  melatonin tablet 6 mg, Nightly PRN  sodium chloride flush 0.9 % injection 5-40 mL, 2 times per day  sodium chloride flush 0.9 % injection 5-40 mL, PRN  0.9 % sodium chloride infusion, PRN  ceFAZolin (ANCEF) 2000 mg in dextrose 5 % 50 mL IVPB, Q8H  calcium carbonate (TUMS) chewable tablet 500 mg, TID PRN  oxyCODONE (ROXICODONE) immediate release tablet 10 mg, Q4H PRN  lactulose (CHRONULAC) 10 GM/15ML solution 10 g, Daily  rifAXIMin (XIFAXAN) tablet 550 mg, BID  miconazole (MICOTIN) 2 % cream, BID  lidocaine 4 % external patch 1 patch, Daily  ipratropium-albuterol (DUONEB) nebulizer solution 3 mL, Q4H PRN  metoprolol succinate (TOPROL XL) extended release tablet 25 mg, Daily  CENTRUM/CERTA-MARJAN with minerals oral solution 15 mL, Daily  vitamin D (CHOLECALCIFEROL) capsule 5,000 Units, Daily  sodium chloride flush 0.9 % injection 5-40 mL, 2 times per day  sodium chloride flush 0.9 % injection 5-40 mL, PRN  0.9 % sodium chloride infusion, PRN  enoxaparin (LOVENOX) injection 40 mg, Daily  ondansetron (ZOFRAN-ODT) disintegrating tablet 4 mg, Q8H PRN   Or  ondansetron (ZOFRAN) injection 4 mg, Q6H PRN  polyethylene glycol (GLYCOLAX) packet 17 g, Daily PRN  pantoprazole (PROTONIX) injection 40 mg, BID  insulin lispro (1 Unit Dial) 0-12 Units, TID WC  insulin lispro (1 Unit Dial) 0-6 Units, Nightly  glucagon (rDNA) injection 1 mg, PRN  dextrose 5 % solution, PRN  dextrose bolus 10% 250 mL, PRN  glucose chewable tablet 16 g, PRN        Objective:  /72   Pulse 63   Temp 97.3 °F (36.3 °C) (Oral)   Resp 18   Ht 5' 6\" (1.676 m)   Wt 199 lb 8 oz (90.5 kg)   LMP 03/31/1982   SpO2 94%   BMI 32.20 kg/m²     Intake/Output Summary (Last 24 hours) at 5/14/2022 0838  Last data filed at 5/14/2022 0451  Gross per 24 hour   Intake 1394.46 ml   Output --   Net 1394.46 ml      Wt Readings from Last 3 Encounters:   05/05/22 199 lb 8 oz (90.5 kg)   04/04/22 229 lb 15 oz (104.3 kg)     Low back tenderness severe  General appearance:  Appears comfortable  Eyes: Sclera clear. Pupils equal.  ENT: Moist oral mucosa. Trachea midline, no adenopathy. Cardiovascular: Regular rhythm, normal S1, S2. No murmur. No edema in lower extremities  Respiratory: Not using accessory muscles. Good inspiratory effort.  Clear to auscultation bilaterally, no wheeze or crackles. GI: Abdomen soft, no tenderness, not distended, normal bowel sounds  Musculoskeletal: No cyanosis in digits, neck supple  Neurology: CN 2-12 grossly intact. No speech or motor deficits  Psych: Normal affect. Alert and oriented in time, place and person  Skin: Warm, dry, normal turgor  Extremity exam shows brisk capillary refill. Peripheral pulses are palpable in lower extremities     Labs and Tests:  CBC:   Recent Labs     05/12/22  0729 05/13/22  0837 05/14/22  0504   WBC 4.2 4.2 4.6   HGB 11.8* 11.6* 11.4*   PLT 96* 87* 87*     BMP:    Recent Labs     05/12/22  0729 05/13/22  0837 05/14/22  0504   * 132* 135*   K 4.3 4.0 3.7    100 102   CO2 24 24 24   BUN 6* 6* 6*   CREATININE 0.6 0.6 0.5*   GLUCOSE 104* 99 103*     Hepatic:   Recent Labs     05/12/22  0729 05/13/22  0837 05/14/22  0504   AST 18 18 22   ALT 6* <5* <5*   BILITOT 1.4* 1.4* 1.2*   ALKPHOS 138* 144* 154*     MRI THORACIC SPINE W WO CONTRAST   Final Result      Discitis and osteomyelitis at T9-10 along with a small epidural abscess/phlegmon and paraspinal inflammatory change. MRI LUMBAR SPINE W WO CONTRAST   Final Result      Discitis and osteomyelitis at T9-10 along with a small epidural abscess/phlegmon and paraspinal inflammatory change. US ABDOMEN LIMITED   Final Result      No ascites identified               CT HEAD WO CONTRAST   Final Result      No acute intracranial pathology                 XR CHEST PORTABLE   Final Result      No acute cardiopulmonary disease.              Dayan Lozada MD   5/14/2022 8:38 AM

## 2022-05-14 NOTE — PROGRESS NOTES
Patient is A&O x4.  RA, sat 97%. No complaints of SOB. Medicated for c/o back pain as needed. Respirations appear to easy and unlabored. Lungs clear. Respirations easy with no complaints of cough. No complaints of nausea/vomiting/diarrhea. Up with assist/lift to the bathroom/BSC as needed. Left arm PICC line intact and flushed with brisk blood return noted. Incontinent of urine and stool. Tolerating regular diet. Plan of care and safety measures reviewed with the patient. Call light in reach and bed alarm in place. Will continue to monitor.   Electronically signed by Genesis Almeida RN on 5/13/2022 at 11:21 PM

## 2022-05-14 NOTE — PROGRESS NOTES
Following secure message sent to house MD:    Patient admitted with UTI/MALENA. Being treated for infection/abcess in back with antibiotic. She is asking for something rashmi help her sleep. May we have order?      Electronically signed by Gregorio Lynne RN on 5/13/2022 at 8:59 PM

## 2022-05-15 LAB
A/G RATIO: 0.9 (ref 1.1–2.2)
ALBUMIN SERPL-MCNC: 3.4 G/DL (ref 3.4–5)
ALP BLD-CCNC: 163 U/L (ref 40–129)
ALT SERPL-CCNC: <5 U/L (ref 10–40)
ANION GAP SERPL CALCULATED.3IONS-SCNC: 9 MMOL/L (ref 3–16)
AST SERPL-CCNC: 26 U/L (ref 15–37)
BASOPHILS ABSOLUTE: 0 K/UL (ref 0–0.2)
BASOPHILS RELATIVE PERCENT: 1 %
BILIRUB SERPL-MCNC: 1.4 MG/DL (ref 0–1)
BUN BLDV-MCNC: 5 MG/DL (ref 7–20)
CALCIUM SERPL-MCNC: 9.5 MG/DL (ref 8.3–10.6)
CHLORIDE BLD-SCNC: 103 MMOL/L (ref 99–110)
CO2: 25 MMOL/L (ref 21–32)
CREAT SERPL-MCNC: 0.6 MG/DL (ref 0.6–1.1)
EOSINOPHILS ABSOLUTE: 0.2 K/UL (ref 0–0.6)
EOSINOPHILS RELATIVE PERCENT: 3.4 %
GFR AFRICAN AMERICAN: >60
GFR NON-AFRICAN AMERICAN: >60
GLUCOSE BLD-MCNC: 103 MG/DL (ref 70–99)
GLUCOSE BLD-MCNC: 132 MG/DL (ref 70–99)
GLUCOSE BLD-MCNC: 220 MG/DL (ref 70–99)
GLUCOSE BLD-MCNC: 79 MG/DL (ref 70–99)
GLUCOSE BLD-MCNC: 94 MG/DL (ref 70–99)
HCT VFR BLD CALC: 34.1 % (ref 36–48)
HEMOGLOBIN: 11.2 G/DL (ref 12–16)
LYMPHOCYTES ABSOLUTE: 1.1 K/UL (ref 1–5.1)
LYMPHOCYTES RELATIVE PERCENT: 23.1 %
MCH RBC QN AUTO: 28.5 PG (ref 26–34)
MCHC RBC AUTO-ENTMCNC: 32.9 G/DL (ref 31–36)
MCV RBC AUTO: 86.7 FL (ref 80–100)
MONOCYTES ABSOLUTE: 0.5 K/UL (ref 0–1.3)
MONOCYTES RELATIVE PERCENT: 10.5 %
NEUTROPHILS ABSOLUTE: 3 K/UL (ref 1.7–7.7)
NEUTROPHILS RELATIVE PERCENT: 62 %
PDW BLD-RTO: 15.1 % (ref 12.4–15.4)
PERFORMED ON: ABNORMAL
PERFORMED ON: ABNORMAL
PERFORMED ON: NORMAL
PERFORMED ON: NORMAL
PLATELET # BLD: 83 K/UL (ref 135–450)
PMV BLD AUTO: 8.1 FL (ref 5–10.5)
POTASSIUM REFLEX MAGNESIUM: 3.7 MMOL/L (ref 3.5–5.1)
RBC # BLD: 3.94 M/UL (ref 4–5.2)
SODIUM BLD-SCNC: 137 MMOL/L (ref 136–145)
TOTAL PROTEIN: 7.3 G/DL (ref 6.4–8.2)
WBC # BLD: 4.8 K/UL (ref 4–11)

## 2022-05-15 PROCEDURE — 6370000000 HC RX 637 (ALT 250 FOR IP): Performed by: INTERNAL MEDICINE

## 2022-05-15 PROCEDURE — 99232 SBSQ HOSP IP/OBS MODERATE 35: CPT | Performed by: HOSPITALIST

## 2022-05-15 PROCEDURE — 1200000000 HC SEMI PRIVATE

## 2022-05-15 PROCEDURE — 80053 COMPREHEN METABOLIC PANEL: CPT

## 2022-05-15 PROCEDURE — 2580000003 HC RX 258: Performed by: INTERNAL MEDICINE

## 2022-05-15 PROCEDURE — 85025 COMPLETE CBC W/AUTO DIFF WBC: CPT

## 2022-05-15 PROCEDURE — C9113 INJ PANTOPRAZOLE SODIUM, VIA: HCPCS | Performed by: INTERNAL MEDICINE

## 2022-05-15 PROCEDURE — 6360000002 HC RX W HCPCS: Performed by: INTERNAL MEDICINE

## 2022-05-15 RX ADMIN — PANTOPRAZOLE SODIUM 40 MG: 40 INJECTION, POWDER, FOR SOLUTION INTRAVENOUS at 09:27

## 2022-05-15 RX ADMIN — RIFAXIMIN 550 MG: 550 TABLET ORAL at 09:27

## 2022-05-15 RX ADMIN — MICONAZOLE NITRATE: 20 CREAM TOPICAL at 09:29

## 2022-05-15 RX ADMIN — FERRIC OXIDE RED: 8; 8 LOTION TOPICAL at 10:06

## 2022-05-15 RX ADMIN — MICONAZOLE NITRATE: 20 CREAM TOPICAL at 20:18

## 2022-05-15 RX ADMIN — OXYCODONE 10 MG: 5 TABLET ORAL at 12:01

## 2022-05-15 RX ADMIN — Medication 6 MG: at 22:37

## 2022-05-15 RX ADMIN — CEFAZOLIN SODIUM 2000 MG: 10 INJECTION, POWDER, FOR SOLUTION INTRAVENOUS at 15:26

## 2022-05-15 RX ADMIN — METOPROLOL SUCCINATE 25 MG: 25 TABLET, FILM COATED, EXTENDED RELEASE ORAL at 09:27

## 2022-05-15 RX ADMIN — Medication 15 ML: at 09:27

## 2022-05-15 RX ADMIN — OXYCODONE 10 MG: 5 TABLET ORAL at 07:09

## 2022-05-15 RX ADMIN — INSULIN LISPRO 4 UNITS: 100 INJECTION, SOLUTION INTRAVENOUS; SUBCUTANEOUS at 11:49

## 2022-05-15 RX ADMIN — PANTOPRAZOLE SODIUM 40 MG: 40 INJECTION, POWDER, FOR SOLUTION INTRAVENOUS at 20:29

## 2022-05-15 RX ADMIN — OXYCODONE 10 MG: 5 TABLET ORAL at 20:28

## 2022-05-15 RX ADMIN — OXYCODONE 10 MG: 5 TABLET ORAL at 01:13

## 2022-05-15 RX ADMIN — Medication 5000 UNITS: at 09:27

## 2022-05-15 RX ADMIN — SODIUM CHLORIDE, PRESERVATIVE FREE 10 ML: 5 INJECTION INTRAVENOUS at 20:29

## 2022-05-15 RX ADMIN — LACTULOSE 10 G: 20 SOLUTION ORAL at 09:28

## 2022-05-15 RX ADMIN — OXYCODONE 10 MG: 5 TABLET ORAL at 16:24

## 2022-05-15 RX ADMIN — CEFAZOLIN SODIUM 2000 MG: 10 INJECTION, POWDER, FOR SOLUTION INTRAVENOUS at 09:22

## 2022-05-15 RX ADMIN — CEFAZOLIN SODIUM 2000 MG: 10 INJECTION, POWDER, FOR SOLUTION INTRAVENOUS at 22:30

## 2022-05-15 RX ADMIN — ONDANSETRON 4 MG: 2 INJECTION INTRAMUSCULAR; INTRAVENOUS at 07:11

## 2022-05-15 RX ADMIN — RIFAXIMIN 550 MG: 550 TABLET ORAL at 20:28

## 2022-05-15 RX ADMIN — ONDANSETRON 4 MG: 2 INJECTION INTRAMUSCULAR; INTRAVENOUS at 20:29

## 2022-05-15 RX ADMIN — SODIUM CHLORIDE, PRESERVATIVE FREE 10 ML: 5 INJECTION INTRAVENOUS at 09:29

## 2022-05-15 ASSESSMENT — PAIN DESCRIPTION - ORIENTATION
ORIENTATION: MID
ORIENTATION: LOWER

## 2022-05-15 ASSESSMENT — PAIN SCALES - GENERAL
PAINLEVEL_OUTOF10: 4
PAINLEVEL_OUTOF10: 8
PAINLEVEL_OUTOF10: 0
PAINLEVEL_OUTOF10: 8
PAINLEVEL_OUTOF10: 7

## 2022-05-15 ASSESSMENT — PAIN DESCRIPTION - LOCATION
LOCATION: BACK
LOCATION: BACK

## 2022-05-15 ASSESSMENT — PAIN DESCRIPTION - DESCRIPTORS
DESCRIPTORS: ACHING
DESCRIPTORS: ACHING

## 2022-05-15 NOTE — PROGRESS NOTES
Nephrology  Note                                                                                                                                                                                                                                                                                                                                                               Office : [phone]     Fax :[phone]              Patient's Name: NITA Dolanfues    Good uO   Cr better   No N/V/D   Feels better  Has mild abdominal pain     Past Medical History:   Diagnosis Date    CHF (congestive heart failure) ([address]     Hyperlipidemia     Obesity     Tracheostomy in place [facility])        Past Surgical History:   Procedure Laterality Date    UPPER GASTROINTESTINAL ENDOSCOPY N/A 3/31/2022    EGD BIOPSY performed by Anne Urban MD at [facility] reviewed. No pertinent family history. reports that she has quit smoking. She has never used smokeless tobacco. She reports previous alcohol use. She reports that she does not use drugs. Allergies:  Patient has no known allergies.     Current Medications:    Calamine 8-8 % lotion, PRN  melatonin tablet 6 mg, Nightly PRN  sodium chloride flush 0.9 % injection 5-40 mL, 2 times per day  sodium chloride flush 0.9 % injection 5-40 mL, PRN  0.9 % sodium chloride infusion, PRN  ceFAZolin (ANCEF) 2000 mg in dextrose 5 % 50 mL IVPB, Q8H  calcium carbonate (TUMS) chewable tablet 500 mg, TID PRN  oxyCODONE (ROXICODONE) immediate release tablet 10 mg, Q4H PRN  lactulose (CHRONULAC) 10 GM/15ML solution 10 g, Daily  rifAXIMin (XIFAXAN) tablet 550 mg, BID  miconazole (MICOTIN) 2 % cream, BID  lidocaine 4 % external patch 1 patch, Daily  ipratropium-albuterol (DUONEB) nebulizer solution 3 mL, Q4H PRN  metoprolol succinate (TOPROL XL) extended release tablet 25 mg, Daily  CENTRUM/CERTA-MARJAN with minerals oral solution 15 mL, Daily  vitamin D (CHOLECALCIFEROL) capsule 5,000 Units, Daily  sodium chloride flush 0.9 % injection 5-40 mL, 2 times per day  sodium chloride flush 0.9 % injection 5-40 mL, PRN  0.9 % sodium chloride infusion, PRN  enoxaparin (LOVENOX) injection 40 mg, Daily  ondansetron (ZOFRAN-ODT) disintegrating tablet 4 mg, Q8H PRN   Or  ondansetron (ZOFRAN) injection 4 mg, Q6H PRN  polyethylene glycol (GLYCOLAX) packet 17 g, Daily PRN  pantoprazole (PROTONIX) injection 40 mg, BID  insulin lispro (1 Unit Dial) 0-12 Units, TID WC  insulin lispro (1 Unit Dial) 0-6 Units, Nightly  glucagon (rDNA) injection 1 mg, PRN  dextrose 5 % solution, PRN  dextrose bolus 10% 250 mL, PRN  glucose chewable tablet 16 g, PRN          Physical exam:     Vitals:  /69   Pulse 71   Temp 98.5 °F (36.9 °C) (Oral)   Resp 18   Ht 5' 6\" (1.676 m)   Wt 203 lb 7.8 oz (92.3 kg)   LMP 03/31/1982   SpO2 95%   BMI 32.84 kg/m²   Constitutional:  OAA X3 NAD  Skin: no rash, turgor wnl  Heent:  eomi, mmm  Neck: no bruits or jvd noted  Cardiovascular:  S1, S2 without m/r/g  Respiratory: dec BS   Abdomen:  +bs, soft, nt, nd  Ext: trace lower extremity edema      Data:   Labs:  CBC:   Recent Labs     05/13/22  0837 05/14/22  0504 05/15/22  0705   WBC 4.2 4.6 4.8   HGB 11.6* 11.4* 11.2*   PLT 87* 87* 83*     BMP:    Recent Labs     05/13/22  0837 05/14/22  0504 05/15/22  0705   * 135* 137   K 4.0 3.7 3.7    102 103   CO2 24 24 25   BUN 6* 6* 5*   CREATININE 0.6 0.5* 0.6   GLUCOSE 99 103* 103*     Ca/Mg/Phos:   Recent Labs     05/13/22  0837 05/14/22  0504 05/15/22  0705   CALCIUM 9.8 9.5 9.5     Hepatic:   Recent Labs     05/13/22  0837 05/14/22  0504 05/15/22  0705   AST 18 22 26   ALT <5* <5* <5*   BILITOT 1.4* 1.2* 1.4*   ALKPHOS 144* 154* 163*     Troponin: No results for input(s): TROPONINI in the last 72 hours. BNP: No results for input(s): BNP in the last 72 hours. Lipids: No results for input(s): CHOL, TRIG, HDL, LDLCALC, LABVLDL in the last 72 hours.   ABGs: No results for input(s): PHART, PO2ART, PDI1TFV in the last 72 hours. INR:   No results for input(s): INR in the last 72 hours. UA:  No results for input(s): Bennett Ding, GLUCOSEU, BILIRUBINUR, KETUA, SPECGRAV, BLOODU, PHUR, PROTEINU, UROBILINOGEN, NITRU, LEUKOCYTESUR, Obdulio Laity in the last 72 hours. Urine Microscopic:   No results for input(s): LABCAST, BACTERIA, COMU, HYALCAST, WBCUA, RBCUA, EPIU in the last 72 hours. Urine Culture:   No results for input(s): LABURIN in the last 72 hours. Urine Chemistry: No results for input(s): Rivera Kinds, PROTEINUR, NAUR in the last 72 hours. IMAGING:  MRI THORACIC SPINE W WO CONTRAST   Final Result      Discitis and osteomyelitis at T9-10 along with a small epidural abscess/phlegmon and paraspinal inflammatory change. MRI LUMBAR SPINE W WO CONTRAST   Final Result      Discitis and osteomyelitis at T9-10 along with a small epidural abscess/phlegmon and paraspinal inflammatory change. US ABDOMEN LIMITED   Final Result      No ascites identified               CT HEAD WO CONTRAST   Final Result      No acute intracranial pathology                 XR CHEST PORTABLE   Final Result      No acute cardiopulmonary disease. Assessment/Plan   1. Encephalopathy  resolved    2. Alc cirrhosis     3. Anemia    4. Acid- base/ Electrolyte imbalance     5.  MALENA     Plan   - off octreotide + Midodrine   - Hold diuretics    - free water restriction   - replace K  - Ur studies - reviewed   - Monitor UO and renal function   - labs in am   - Chronic hypoxic resp 2/2 COVID PNA s/p trach    Recommend to dose adjust all medications  based on renal functions  Maintain SBP> 90 mmHg   Daily weights   AVOID NSAIDs  Avoid Nephrotoxins  Monitor Intake/Output  Call if significant decrease in urine output                 Thank you for allowing us to participate in care of Lakeshia Kathleen MD  Feel free to contact me   [facility]  Office : [phone]  Fax :[phone]

## 2022-05-15 NOTE — PROGRESS NOTES
HOSPITALISTS PROGRESS NOTE    5/15/2022 11:56 AM        Name: Nimesh Asif Admitted: 5/5/2022  Primary Care Provider: ANNIA Abdi CNP (Tel: [phone])      Problem List  Principal Problem:    Acute metabolic encephalopathy  Active Problems:    Hyperammonemia (HCC)    Hyponatremia    Azotemia    MALENA (acute kidney injury) ([facility] [address]    Altered mental status  Resolved Problems:    * No resolved hospital problems. *       Assessment & Plan:   MSSA bacteremia with T9-T10 osteomyelitis/discitis with epidural abscess  Echo negative for vegetation  PICC placed, on Ancef, ID input noticed  Await placement     Bacteriuria versus UTI  Ancef will cover discussed with ID    MALENA improved  Continue to hold spironolactone and torsemide on discharge for now    Peptic ulcer disease  March EGD showing 3 clean-based antral ulcers and duodenal ulcers continue on Protonix twice daily on discharge      Alcoholic cirrhosis hepatitis C antibody negative hepatitis B negative  On lactulose and rifaximin    Diabetes mellitus with neuropathy  Chronic diastolic heart failure  Bipolar on Seroquel    Itching   Calamine lotion  Improved         IV Access: Peripheral  Briggs: No  Diet: ADULT DIET;  Regular; 1200 ml  ADULT ORAL NUTRITION SUPPLEMENT; Lunch, Dinner; Frozen Oral Supplement  Code:Full Code  DVT PPX Heparin  Disposition await placement, patient is medically stable for discharge,    Brief Course: History Of Present Illness:       59 y.o. female with a past medical history of alcoholic cirrhosis, COVID-pneumonia complicated by respiratory failure status post tracheostomy (no vent requirement), diabetes, who presents from her living facility with altered mental status and possible ingestion.  Patient currently resides in a nursing home [facility]) and was noted to be confused this morning upon awakening.  Squad also states that patient's nursing home reported a visitor around 3 AM, with concern for administering Tylenol PM.  Patient admits to taking 2 these, but denies any other ingestions today.  Patient reporting pain in her back as well as in her abdomen, she has no shortness of breath or chest pain.  No fevers that she is reporting.     Nursing facility reports she has some visitor that frequently visits her and everytime after the visitor comes she would become very lethargic and drowsy. It was reported that she was visited by the visitors again and was given narcan afterwards. But prior UDS neg for opioids. Was found to have MSSA bacteremia with T9-T10 discitis and epidural abscess. Awaiting placement       CC: Bacteremia    Subjective:  .   No new issues  Awaiting placement     Reviewed interval ancillary notes    Current Medications  Calamine 8-8 % lotion, PRN  melatonin tablet 6 mg, Nightly PRN  sodium chloride flush 0.9 % injection 5-40 mL, 2 times per day  sodium chloride flush 0.9 % injection 5-40 mL, PRN  0.9 % sodium chloride infusion, PRN  ceFAZolin (ANCEF) 2000 mg in dextrose 5 % 50 mL IVPB, Q8H  calcium carbonate (TUMS) chewable tablet 500 mg, TID PRN  oxyCODONE (ROXICODONE) immediate release tablet 10 mg, Q4H PRN  lactulose (CHRONULAC) 10 GM/15ML solution 10 g, Daily  rifAXIMin (XIFAXAN) tablet 550 mg, BID  miconazole (MICOTIN) 2 % cream, BID  lidocaine 4 % external patch 1 patch, Daily  ipratropium-albuterol (DUONEB) nebulizer solution 3 mL, Q4H PRN  metoprolol succinate (TOPROL XL) extended release tablet 25 mg, Daily  CENTRUM/CERTA-MARJAN with minerals oral solution 15 mL, Daily  vitamin D (CHOLECALCIFEROL) capsule 5,000 Units, Daily  sodium chloride flush 0.9 % injection 5-40 mL, 2 times per day  sodium chloride flush 0.9 % injection 5-40 mL, PRN  0.9 % sodium chloride infusion, PRN  enoxaparin (LOVENOX) injection 40 mg, Daily  ondansetron (ZOFRAN-ODT) disintegrating tablet 4 mg, Q8H PRN   Or  ondansetron (ZOFRAN) injection 4 mg, Q6H PRN  polyethylene glycol (GLYCOLAX) packet 17 g, Daily PRN  pantoprazole (PROTONIX) injection 40 mg, BID  insulin lispro (1 Unit Dial) 0-12 Units, TID WC  insulin lispro (1 Unit Dial) 0-6 Units, Nightly  glucagon (rDNA) injection 1 mg, PRN  dextrose 5 % solution, PRN  dextrose bolus 10% 250 mL, PRN  glucose chewable tablet 16 g, PRN        Objective:  /69   Pulse 71   Temp 98.5 °F (36.9 °C) (Oral)   Resp 18   Ht 5' 6\" (1.676 m)   Wt 203 lb 7.8 oz (92.3 kg)   LMP 03/31/1982   SpO2 95%   BMI 32.84 kg/m²     Intake/Output Summary (Last 24 hours) at 5/15/2022 1156  Last data filed at 5/14/2022 1600  Gross per 24 hour   Intake 480 ml   Output --   Net 480 ml      Wt Readings from Last 3 Encounters:   05/14/22 203 lb 7.8 oz (92.3 kg)   04/04/22 229 lb 15 oz (104.3 kg)     Low back tenderness severe  General appearance:  Appears comfortable  Eyes: Sclera clear. Pupils equal.  ENT: Moist oral mucosa. Trachea midline, no adenopathy. Cardiovascular: Regular rhythm, normal S1, S2. No murmur. No edema in lower extremities  Respiratory: Not using accessory muscles. Good inspiratory effort. Clear to auscultation bilaterally, no wheeze or crackles. GI: Abdomen soft, no tenderness, not distended, normal bowel sounds  Musculoskeletal: No cyanosis in digits, neck supple  Neurology: CN 2-12 grossly intact. No speech or motor deficits  Psych: Normal affect. Alert and oriented in time, place and person  Skin: Warm, dry, normal turgor  Extremity exam shows brisk capillary refill.   Peripheral pulses are palpable in lower extremities     Labs and Tests:  CBC:   Recent Labs     05/13/22  0837 05/14/22  0504 05/15/22  0705   WBC 4.2 4.6 4.8   HGB 11.6* 11.4* 11.2*   PLT 87* 87* 83*     BMP:    Recent Labs     05/13/22  0837 05/14/22  0504 05/15/22  0705   * 135* 137   K 4.0 3.7 3.7    102 103   CO2 24 24 25   BUN 6* 6* 5*   CREATININE 0.6 0.5* 0.6   GLUCOSE 99 103* 103*     Hepatic:   Recent Labs     05/13/22  0837 05/14/22  0504 05/15/22  0705   AST 18 22 26   ALT <5* <5* <5*   BILITOT 1.4* 1.2* 1.4*   ALKPHOS 144* 154* 163*     MRI THORACIC SPINE W WO CONTRAST   Final Result      Discitis and osteomyelitis at T9-10 along with a small epidural abscess/phlegmon and paraspinal inflammatory change. MRI LUMBAR SPINE W WO CONTRAST   Final Result      Discitis and osteomyelitis at T9-10 along with a small epidural abscess/phlegmon and paraspinal inflammatory change. US ABDOMEN LIMITED   Final Result      No ascites identified               CT HEAD WO CONTRAST   Final Result      No acute intracranial pathology                 XR CHEST PORTABLE   Final Result      No acute cardiopulmonary disease.              David Saldivar MD   5/15/2022 11:56 AM

## 2022-05-15 NOTE — PROGRESS NOTES
Patient alert and oriented x4 and has c/o of some pain and weakness. Patient still refusing to ambulate to bathroom d/t pain. Pain managed with medication and no other complaints at this time. Respiratory at bedside this shift and trache care complete.  Will continue to monitor.

## 2022-05-16 LAB
A/G RATIO: 0.9 (ref 1.1–2.2)
ALBUMIN SERPL-MCNC: 3.5 G/DL (ref 3.4–5)
ALP BLD-CCNC: 167 U/L (ref 40–129)
ALT SERPL-CCNC: 7 U/L (ref 10–40)
ANION GAP SERPL CALCULATED.3IONS-SCNC: 12 MMOL/L (ref 3–16)
AST SERPL-CCNC: 32 U/L (ref 15–37)
BASOPHILS ABSOLUTE: 0 K/UL (ref 0–0.2)
BASOPHILS RELATIVE PERCENT: 0.9 %
BILIRUB SERPL-MCNC: 1.1 MG/DL (ref 0–1)
BUN BLDV-MCNC: 4 MG/DL (ref 7–20)
CALCIUM SERPL-MCNC: 9.1 MG/DL (ref 8.3–10.6)
CHLORIDE BLD-SCNC: 102 MMOL/L (ref 99–110)
CO2: 22 MMOL/L (ref 21–32)
CREAT SERPL-MCNC: 0.6 MG/DL (ref 0.6–1.1)
EOSINOPHILS ABSOLUTE: 0.2 K/UL (ref 0–0.6)
EOSINOPHILS RELATIVE PERCENT: 3.9 %
GFR AFRICAN AMERICAN: >60
GFR NON-AFRICAN AMERICAN: >60
GLUCOSE BLD-MCNC: 103 MG/DL (ref 70–99)
GLUCOSE BLD-MCNC: 120 MG/DL (ref 70–99)
GLUCOSE BLD-MCNC: 139 MG/DL (ref 70–99)
GLUCOSE BLD-MCNC: 94 MG/DL (ref 70–99)
GLUCOSE BLD-MCNC: 97 MG/DL (ref 70–99)
HCT VFR BLD CALC: 34 % (ref 36–48)
HEMOGLOBIN: 11.3 G/DL (ref 12–16)
LYMPHOCYTES ABSOLUTE: 1 K/UL (ref 1–5.1)
LYMPHOCYTES RELATIVE PERCENT: 19.8 %
MAGNESIUM: 1.1 MG/DL (ref 1.8–2.4)
MCH RBC QN AUTO: 28.6 PG (ref 26–34)
MCHC RBC AUTO-ENTMCNC: 33 G/DL (ref 31–36)
MCV RBC AUTO: 86.4 FL (ref 80–100)
MONOCYTES ABSOLUTE: 0.6 K/UL (ref 0–1.3)
MONOCYTES RELATIVE PERCENT: 12 %
NEUTROPHILS ABSOLUTE: 3.2 K/UL (ref 1.7–7.7)
NEUTROPHILS RELATIVE PERCENT: 63.4 %
PDW BLD-RTO: 15.2 % (ref 12.4–15.4)
PERFORMED ON: ABNORMAL
PERFORMED ON: ABNORMAL
PERFORMED ON: NORMAL
PERFORMED ON: NORMAL
PLATELET # BLD: 81 K/UL (ref 135–450)
PMV BLD AUTO: 7.9 FL (ref 5–10.5)
POTASSIUM REFLEX MAGNESIUM: 3.1 MMOL/L (ref 3.5–5.1)
RBC # BLD: 3.94 M/UL (ref 4–5.2)
SODIUM BLD-SCNC: 136 MMOL/L (ref 136–145)
TOTAL PROTEIN: 7.3 G/DL (ref 6.4–8.2)
WBC # BLD: 5.1 K/UL (ref 4–11)

## 2022-05-16 PROCEDURE — 83735 ASSAY OF MAGNESIUM: CPT

## 2022-05-16 PROCEDURE — 6370000000 HC RX 637 (ALT 250 FOR IP): Performed by: INTERNAL MEDICINE

## 2022-05-16 PROCEDURE — 99232 SBSQ HOSP IP/OBS MODERATE 35: CPT | Performed by: INTERNAL MEDICINE

## 2022-05-16 PROCEDURE — 97535 SELF CARE MNGMENT TRAINING: CPT

## 2022-05-16 PROCEDURE — C9113 INJ PANTOPRAZOLE SODIUM, VIA: HCPCS | Performed by: INTERNAL MEDICINE

## 2022-05-16 PROCEDURE — 6360000002 HC RX W HCPCS: Performed by: INTERNAL MEDICINE

## 2022-05-16 PROCEDURE — 2580000003 HC RX 258: Performed by: INTERNAL MEDICINE

## 2022-05-16 PROCEDURE — 97116 GAIT TRAINING THERAPY: CPT

## 2022-05-16 PROCEDURE — 97530 THERAPEUTIC ACTIVITIES: CPT

## 2022-05-16 PROCEDURE — 80053 COMPREHEN METABOLIC PANEL: CPT

## 2022-05-16 PROCEDURE — 85025 COMPLETE CBC W/AUTO DIFF WBC: CPT

## 2022-05-16 PROCEDURE — 1200000000 HC SEMI PRIVATE

## 2022-05-16 RX ORDER — SPIRONOLACTONE 50 MG/1
50 TABLET, FILM COATED ORAL DAILY
Status: DISCONTINUED | OUTPATIENT
Start: 2022-05-16 | End: 2022-05-17 | Stop reason: HOSPADM

## 2022-05-16 RX ADMIN — MICONAZOLE NITRATE: 20 CREAM TOPICAL at 09:42

## 2022-05-16 RX ADMIN — CEFAZOLIN 2000 MG: 2 INJECTION, POWDER, FOR SOLUTION INTRAMUSCULAR; INTRAVENOUS at 22:15

## 2022-05-16 RX ADMIN — OXYCODONE 10 MG: 5 TABLET ORAL at 02:01

## 2022-05-16 RX ADMIN — RIFAXIMIN 550 MG: 550 TABLET ORAL at 22:07

## 2022-05-16 RX ADMIN — ONDANSETRON 4 MG: 2 INJECTION INTRAMUSCULAR; INTRAVENOUS at 09:42

## 2022-05-16 RX ADMIN — Medication 5000 UNITS: at 09:42

## 2022-05-16 RX ADMIN — OXYCODONE 10 MG: 5 TABLET ORAL at 06:10

## 2022-05-16 RX ADMIN — Medication 15 ML: at 09:46

## 2022-05-16 RX ADMIN — CEFAZOLIN 2000 MG: 2 INJECTION, POWDER, FOR SOLUTION INTRAMUSCULAR; INTRAVENOUS at 15:43

## 2022-05-16 RX ADMIN — POTASSIUM BICARBONATE 40 MEQ: 782 TABLET, EFFERVESCENT ORAL at 12:19

## 2022-05-16 RX ADMIN — SODIUM CHLORIDE, PRESERVATIVE FREE 10 ML: 5 INJECTION INTRAVENOUS at 21:00

## 2022-05-16 RX ADMIN — SODIUM CHLORIDE, PRESERVATIVE FREE 10 ML: 5 INJECTION INTRAVENOUS at 09:43

## 2022-05-16 RX ADMIN — OXYCODONE 10 MG: 5 TABLET ORAL at 17:02

## 2022-05-16 RX ADMIN — MICONAZOLE NITRATE: 20 CREAM TOPICAL at 22:22

## 2022-05-16 RX ADMIN — SODIUM CHLORIDE 20 ML/HR: 9 INJECTION, SOLUTION INTRAVENOUS at 15:41

## 2022-05-16 RX ADMIN — OXYCODONE 10 MG: 5 TABLET ORAL at 22:07

## 2022-05-16 RX ADMIN — Medication 6 MG: at 22:12

## 2022-05-16 RX ADMIN — METOPROLOL SUCCINATE 25 MG: 25 TABLET, FILM COATED, EXTENDED RELEASE ORAL at 09:42

## 2022-05-16 RX ADMIN — CEFAZOLIN SODIUM 2000 MG: 10 INJECTION, POWDER, FOR SOLUTION INTRAVENOUS at 06:03

## 2022-05-16 RX ADMIN — SPIRONOLACTONE 50 MG: 50 TABLET ORAL at 12:19

## 2022-05-16 RX ADMIN — OXYCODONE 10 MG: 5 TABLET ORAL at 12:19

## 2022-05-16 RX ADMIN — SODIUM CHLORIDE, PRESERVATIVE FREE 10 ML: 5 INJECTION INTRAVENOUS at 22:07

## 2022-05-16 RX ADMIN — ENOXAPARIN SODIUM 40 MG: 100 INJECTION SUBCUTANEOUS at 09:42

## 2022-05-16 RX ADMIN — PANTOPRAZOLE SODIUM 40 MG: 40 INJECTION, POWDER, FOR SOLUTION INTRAVENOUS at 22:22

## 2022-05-16 RX ADMIN — ONDANSETRON 4 MG: 2 INJECTION INTRAMUSCULAR; INTRAVENOUS at 22:12

## 2022-05-16 RX ADMIN — RIFAXIMIN 550 MG: 550 TABLET ORAL at 09:41

## 2022-05-16 RX ADMIN — PANTOPRAZOLE SODIUM 40 MG: 40 INJECTION, POWDER, FOR SOLUTION INTRAVENOUS at 09:42

## 2022-05-16 ASSESSMENT — PAIN DESCRIPTION - FREQUENCY
FREQUENCY: CONTINUOUS
FREQUENCY: CONTINUOUS

## 2022-05-16 ASSESSMENT — PAIN SCALES - GENERAL
PAINLEVEL_OUTOF10: 9
PAINLEVEL_OUTOF10: 10
PAINLEVEL_OUTOF10: 6
PAINLEVEL_OUTOF10: 9
PAINLEVEL_OUTOF10: 8
PAINLEVEL_OUTOF10: 8
PAINLEVEL_OUTOF10: 2
PAINLEVEL_OUTOF10: 9

## 2022-05-16 ASSESSMENT — PAIN DESCRIPTION - LOCATION
LOCATION: BACK

## 2022-05-16 ASSESSMENT — PAIN DESCRIPTION - ORIENTATION
ORIENTATION: MID
ORIENTATION: MID
ORIENTATION: LOWER

## 2022-05-16 ASSESSMENT — PAIN DESCRIPTION - PAIN TYPE
TYPE: ACUTE PAIN
TYPE: ACUTE PAIN

## 2022-05-16 ASSESSMENT — PAIN DESCRIPTION - DESCRIPTORS
DESCRIPTORS: SHARP
DESCRIPTORS: ACHING
DESCRIPTORS: ACHING

## 2022-05-16 ASSESSMENT — PAIN DESCRIPTION - ONSET
ONSET: ON-GOING
ONSET: ON-GOING

## 2022-05-16 ASSESSMENT — PAIN - FUNCTIONAL ASSESSMENT
PAIN_FUNCTIONAL_ASSESSMENT: ACTIVITIES ARE NOT PREVENTED
PAIN_FUNCTIONAL_ASSESSMENT: PREVENTS OR INTERFERES SOME ACTIVE ACTIVITIES AND ADLS

## 2022-05-16 NOTE — PROGRESS NOTES
Comprehensive Nutrition Assessment    Meets ASPEN criteria for severe malnutrition. Aggressive nutrition intervention should be considered, up to and including alternative means of nutrition/hydration, if nutrition status can not improve. RECOMMENDATIONS:  1. PO Diet: Continue regular diet; 1200 ml fluid restriction per MD  2. ONS: Continue Magic Cups BID  3. Encourage pt to consume adequate PO intakes    NUTRITION ASSESSMENT:   Nutritional summary & status: Follow Up: Pt oral intakes improving slightly, but still <25% PO intakes on average per meal documentation. Pt has had about 11% wt loss within the past month. Pt wt loss combined with poor intakes meeting ASPEN criteria for malnutrition. Pt remains on FR~will continue to send Magic Cup ONS for additional nutrient intakes while complying w/ FR. Noted d/c orders in~pt awaiting placement per chart review.  Will continue to monitor nutritional adequacy throughout adm     Admission/PMH: MSSA bacteremia with T9-T10 osteomyelitis/discitis with epidural abscess / peptic ulcer disease, alcoholic cirrhosis hep C, DM with neuropathy, CHF     MALNUTRITION ASSESSMENT  Context of Malnutrition: Acute Illness   Malnutrition Status: Severe malnutrition  Findings of the 6 clinical characteristics of malnutrition (Minimum of 2 out of 6 clinical characteristics is required to make the diagnosis of moderate or severe Protein Calorie Malnutrition based on AND/ASPEN Guidelines):  Energy Intake: Less than/equal to 50% of estimated energy requirements    Energy Intake Time: Greater than or equal to 7 days    Weight Loss %: 10% loss or greater    Weight loss Time: Greater than or equal to 1 month      NUTRITION DIAGNOSIS   Inadequate oral intake related to acute injury/trauma as evidenced by intake 0-25%    NUTRITION INTERVENTION  Food and/or Nutrient Delivery:  Continue Current Diet,Continue Oral Nutrition Supplement  Nutrition Education/Counseling:  No recommendation at this time Goals:  Pt will improve intakes to meet >75% of nutrition needs during adm         Nutrition Monitoring and Evaluation:   Food/Nutrient Intake Outcomes:  Food and Nutrient Intake,Supplement Intake  Physical Signs/Symptoms Outcomes:  Biochemical Data,Weight     OBJECTIVE DATA: Significant to nutrition assessment  · Nutrition-Focused Physical Findings: lbm 5/16; trach in place  · Labs: Reviewed; Na 132  · Meds: Reviewed; vitamin D, protonix, lactulose, spironolactone  · Wounds: None       CURRENT NUTRITION THERAPIES  ADULT DIET; Regular; 1200 ml  ADULT ORAL NUTRITION SUPPLEMENT; Lunch, Dinner; Frozen Oral Supplement     PO Intake: 1-25%,51-75%   PO Supplement Intake:None Ordered  Additional Sources of Calories/IVF:n/a     ANTHROPOMETRICS  Current Height: 5' 6\" (167.6 cm)  Current Weight: 203 lb 7.8 oz (92.3 kg)    Admission weight: 201 lb 4.8 oz (91.3 kg)  Ideal Body Weight (IBW): 130 lbs  (59 kg)    Usual Bodyweight  (ml)   Weight Changes: -26lb x 1 mo (11.4% wt loss)      BMI: 32.9    Wt Readings from Last 50 Encounters:   05/14/22 203 lb 7.8 oz (92.3 kg)   04/04/22 229 lb 15 oz (104.3 kg)       COMPARATIVE STANDARDS  Energy (kcal):  7779-0177     Protein (g):  72-83       Fluid (ml/day):  <1200 ml/d per MD    The patient will still be monitored per nutrition standards of care. Consult dietitian if nutrition interventions essential to patient care is needed.      Jagdeep Blakely [address] [place] [address]  Rolla:  [phone]  Office:  [phone]

## 2022-05-16 NOTE — PROGRESS NOTES
Nephrology  Note                                                                                                                                                                                                                                                                                                                                                               Office : [phone]     Fax :[phone]              Patient's Name: NITA ALLEN Rehfues    Good uO   Cr better   No N/V/D   Feels better      Past Medical History:   Diagnosis Date    CHF (congestive heart failure) ([address]     Hyperlipidemia     Obesity     Tracheostomy in place [facility])        Past Surgical History:   Procedure Laterality Date    UPPER GASTROINTESTINAL ENDOSCOPY N/A 3/31/2022    EGD BIOPSY performed by Grace Alba MD at [facility] reviewed. No pertinent family history. reports that she has quit smoking. She has never used smokeless tobacco. She reports previous alcohol use. She reports that she does not use drugs. Allergies:  Patient has no known allergies.     Current Medications:    potassium bicarb-citric acid (EFFER-K) effervescent tablet 40 mEq, Daily  sodium chloride (OCEAN, BABY AYR) 0.65 % nasal spray 1 spray, PRN  Calamine 8-8 % lotion, PRN  melatonin tablet 6 mg, Nightly PRN  sodium chloride flush 0.9 % injection 5-40 mL, 2 times per day  sodium chloride flush 0.9 % injection 5-40 mL, PRN  0.9 % sodium chloride infusion, PRN  ceFAZolin (ANCEF) 2000 mg in dextrose 5 % 50 mL IVPB, Q8H  calcium carbonate (TUMS) chewable tablet 500 mg, TID PRN  oxyCODONE (ROXICODONE) immediate release tablet 10 mg, Q4H PRN  lactulose (CHRONULAC) 10 GM/15ML solution 10 g, Daily  rifAXIMin (XIFAXAN) tablet 550 mg, BID  miconazole (MICOTIN) 2 % cream, BID  lidocaine 4 % external patch 1 patch, Daily  ipratropium-albuterol (DUONEB) nebulizer solution 3 mL, Q4H PRN  metoprolol succinate (TOPROL XL) extended release tablet 25 the last 72 hours. Lipids: No results for input(s): CHOL, TRIG, HDL, LDLCALC, LABVLDL in the last 72 hours. ABGs: No results for input(s): PHART, PO2ART, SFC4NJH in the last 72 hours. INR:   No results for input(s): INR in the last 72 hours. UA:  No results for input(s): Chace Kranthi, GLUCOSEU, BILIRUBINUR, KETUA, SPECGRAV, BLOODU, PHUR, PROTEINU, UROBILINOGEN, NITRU, LEUKOCYTESUR, Gae Jassi in the last 72 hours. Urine Microscopic:   No results for input(s): LABCAST, BACTERIA, COMU, HYALCAST, WBCUA, RBCUA, EPIU in the last 72 hours. Urine Culture:   No results for input(s): LABURIN in the last 72 hours. Urine Chemistry: No results for input(s): Elsy Abelson, PROTEINUR, NAUR in the last 72 hours. IMAGING:  MRI THORACIC SPINE W WO CONTRAST   Final Result      Discitis and osteomyelitis at T9-10 along with a small epidural abscess/phlegmon and paraspinal inflammatory change. MRI LUMBAR SPINE W WO CONTRAST   Final Result      Discitis and osteomyelitis at T9-10 along with a small epidural abscess/phlegmon and paraspinal inflammatory change. US ABDOMEN LIMITED   Final Result      No ascites identified               CT HEAD WO CONTRAST   Final Result      No acute intracranial pathology                 XR CHEST PORTABLE   Final Result      No acute cardiopulmonary disease. Assessment/Plan   1. Encephalopathy  resolved    2. Alc cirrhosis     3. Anemia    4. Acid- base/ Electrolyte imbalance     5.  MALENA     Plan   - off octreotide + Midodrine   - restart aldactone   - free water restriction   - replace K  - Ur studies - reviewed   - Monitor UO and renal function   - labs in am   - Chronic hypoxic resp 2/2 COVID PNA s/p trach    Recommend to dose adjust all medications  based on renal functions  Maintain SBP> 90 mmHg   Daily weights   AVOID NSAIDs  Avoid Nephrotoxins  Monitor Intake/Output  Call if significant decrease in urine output                 Thank you for allowing us to participate in care of Meghan Schofield MD  Feel free to contact me   [facility] of [address]  Office : [phone]  Fax :[phone]

## 2022-05-16 NOTE — PLAN OF CARE
Problem: Discharge Planning  Goal: Discharge to home or other facility with appropriate resources  Note: Awaiting precert for discharge. Problem: Skin/Tissue Integrity  Goal: Absence of new skin breakdown  Description: 1. Monitor for areas of redness and/or skin breakdown  2. Assess vascular access sites hourly  3. Every 4-6 hours minimum:  Change oxygen saturation probe site  4. Every 4-6 hours:  If on nasal continuous positive airway pressure, respiratory therapy assess nares and determine need for appliance change or resting period. Note: Rash to fold still moist and red, but much improved since Friday. Miconazole cream applied. Problem: Safety - Adult  Goal: Free from fall injury  Note: Door open, and bed alarm on. Call light in reach. She calls appropriately for assistance. Problem: Chronic Conditions and Co-morbidities  Goal: Patient's chronic conditions and co-morbidity symptoms are monitored and maintained or improved  Note: She is receiving IV antibiotics for oseo of the spine. PICC line in place, dressing changed today. Site is bruised, but no s/s of infection. Problem: Pain  Goal: Verbalizes/displays adequate comfort level or baseline comfort level  Note: Medicated for pain with oxycodone. Patient satisfied with regimen. Problem: Nutrition Deficit:  Goal: Optimize nutritional status  Note: Patient is not eating. She sips on the ensure clear.  Zofran given x 1 this am.

## 2022-05-16 NOTE — PROGRESS NOTES
HOSPITALISTS PROGRESS NOTE    5/16/2022 10:20 AM        Name: Princess Rutledge . Admitted: 5/5/2022  Primary Care Provider: ANNIA Wood CNP (Tel: [phone])      Problem List  Principal Problem:    Acute metabolic encephalopathy  Active Problems:    Hyperammonemia (HCC)    Hyponatremia    Azotemia    [facility]ALENA [facility] ([facility]    Altered mental status  Resolved Problems:    * No resolved hospital problems. *       Assessment & Plan:   MSSA bacteremia with T9-T10 osteomyelitis/discitis with epidural abscess  Echo negative for vegetation  PICC placed, on Ancef, ID input noticed  Await placement     Bacteriuria versus UTI  Ancef will cover discussed with ID    MALENA improved  Continue to hold spironolactone and torsemide on discharge for now    Peptic ulcer disease  March EGD showing 3 clean-based antral ulcers and duodenal ulcers continue on Protonix twice daily on discharge      Alcoholic cirrhosis hepatitis C antibody negative hepatitis B negative  On lactulose and rifaximin    Diabetes mellitus with neuropathy  Chronic diastolic heart failure  Bipolar on Seroquel    Itching   Calamine lotion  Improved         IV Access: Peripheral  Briggs: No  Diet: ADULT DIET;  Regular; 1200 ml  ADULT ORAL NUTRITION SUPPLEMENT; Lunch, Dinner; Frozen Oral Supplement  Code:Full Code  DVT PPX Heparin  Disposition await placement, patient is medically stable for discharge,    Brief Course: History Of Present Illness:       59 y.o. female with a past medical history of alcoholic cirrhosis, COVID-pneumonia complicated by respiratory failure status post tracheostomy (no vent requirement), diabetes, who presents from her living facility with altered mental status and possible ingestion.  Patient currently resides in a nursing home [facility]) and was noted to be confused this morning upon awakening.  Squad also states that patient's nursing home reported a visitor around 3 AM, with concern for administering Tylenol PM.  Patient admits to taking 2 these, but denies any other ingestions today.  Patient reporting pain in her back as well as in her abdomen, she has no shortness of breath or chest pain.  No fevers that she is reporting.     Nursing facility reports she has some visitor that frequently visits her and everytime after the visitor comes she would become very lethargic and drowsy. It was reported that she was visited by the visitors again and was given narcan afterwards. But prior UDS neg for opioids. Was found to have MSSA bacteremia with T9-T10 discitis and epidural abscess. Awaiting placement       CC: Bacteremia    Subjective:  .   No new issues  Awaiting placement     Reviewed interval ancillary notes    Current Medications  sodium chloride (OCEAN, BABY AYR) 0.65 % nasal spray 1 spray, PRN  Calamine 8-8 % lotion, PRN  melatonin tablet 6 mg, Nightly PRN  sodium chloride flush 0.9 % injection 5-40 mL, 2 times per day  sodium chloride flush 0.9 % injection 5-40 mL, PRN  0.9 % sodium chloride infusion, PRN  ceFAZolin (ANCEF) 2000 mg in dextrose 5 % 50 mL IVPB, Q8H  calcium carbonate (TUMS) chewable tablet 500 mg, TID PRN  oxyCODONE (ROXICODONE) immediate release tablet 10 mg, Q4H PRN  lactulose (CHRONULAC) 10 GM/15ML solution 10 g, Daily  rifAXIMin (XIFAXAN) tablet 550 mg, BID  miconazole (MICOTIN) 2 % cream, BID  lidocaine 4 % external patch 1 patch, Daily  ipratropium-albuterol (DUONEB) nebulizer solution 3 mL, Q4H PRN  metoprolol succinate (TOPROL XL) extended release tablet 25 mg, Daily  CENTRUM/CERTA-MARJAN with minerals oral solution 15 mL, Daily  vitamin D (CHOLECALCIFEROL) capsule 5,000 Units, Daily  sodium chloride flush 0.9 % injection 5-40 mL, 2 times per day  sodium chloride flush 0.9 % injection 5-40 mL, PRN  0.9 % sodium chloride infusion, PRN  enoxaparin (LOVENOX) injection 40 mg, Daily  ondansetron (ZOFRAN-ODT) disintegrating tablet 4 mg, Q8H PRN   Or  ondansetron (ZOFRAN) injection 4 mg, Q6H PRN  polyethylene glycol (GLYCOLAX) packet 17 g, Daily PRN  pantoprazole (PROTONIX) injection 40 mg, BID  insulin lispro (1 Unit Dial) 0-12 Units, TID WC  insulin lispro (1 Unit Dial) 0-6 Units, Nightly  glucagon (rDNA) injection 1 mg, PRN  dextrose 5 % solution, PRN  dextrose bolus 10% 250 mL, PRN  glucose chewable tablet 16 g, PRN        Objective:  BP (!) 91/56   Pulse 66   Temp 97.5 °F (36.4 °C) (Oral)   Resp 16   Ht 5' 6\" (1.676 m)   Wt 203 lb 7.8 oz (92.3 kg)   LMP 03/31/1982   SpO2 95%   BMI 32.84 kg/m²     Intake/Output Summary (Last 24 hours) at 5/16/2022 1020  Last data filed at 5/16/2022 0211  Gross per 24 hour   Intake 600 ml   Output 700 ml   Net -100 ml      Wt Readings from Last 3 Encounters:   05/14/22 203 lb 7.8 oz (92.3 kg)   04/04/22 229 lb 15 oz (104.3 kg)     Low back tenderness severe  General appearance:  Appears comfortable  Eyes: Sclera clear. Pupils equal.  ENT: Moist oral mucosa. Trachea midline, no adenopathy. Cardiovascular: Regular rhythm, normal S1, S2. No murmur. No edema in lower extremities  Respiratory: Not using accessory muscles. Good inspiratory effort. Clear to auscultation bilaterally, no wheeze or crackles. GI: Abdomen soft, no tenderness, not distended, normal bowel sounds  Musculoskeletal: No cyanosis in digits, neck supple  Neurology: CN 2-12 grossly intact. No speech or motor deficits  Psych: Normal affect. Alert and oriented in time, place and person  Skin: Warm, dry, normal turgor  Extremity exam shows brisk capillary refill.   Peripheral pulses are palpable in lower extremities     Labs and Tests:  CBC:   Recent Labs     05/14/22  0504 05/15/22  0705 05/16/22  0611   WBC 4.6 4.8 5.1   HGB 11.4* 11.2* 11.3*   PLT 87* 83* 81*     BMP:    Recent Labs     05/14/22  0504 05/15/22  0705 05/16/22  0611   * 137 136   K 3.7 3.7 3.1*    103 102   CO2 24 25 22 BUN 6* 5* 4*   CREATININE 0.5* 0.6 0.6   GLUCOSE 103* 103* 103*     Hepatic:   Recent Labs     05/14/22  0504 05/15/22  0705 05/16/22  0611   AST 22 26 32   ALT <5* <5* 7*   BILITOT 1.2* 1.4* 1.1*   ALKPHOS 154* 163* 167*     MRI THORACIC SPINE W WO CONTRAST   Final Result      Discitis and osteomyelitis at T9-10 along with a small epidural abscess/phlegmon and paraspinal inflammatory change. MRI LUMBAR SPINE W WO CONTRAST   Final Result      Discitis and osteomyelitis at T9-10 along with a small epidural abscess/phlegmon and paraspinal inflammatory change. US ABDOMEN LIMITED   Final Result      No ascites identified               CT HEAD WO CONTRAST   Final Result      No acute intracranial pathology                 XR CHEST PORTABLE   Final Result      No acute cardiopulmonary disease.              Julissa Manuel MD   5/16/2022 10:20 AM

## 2022-05-16 NOTE — PROGRESS NOTES
Occupational Therapy  Facility/Department: [address]  Daily Treatment Note  NAME: NITA Etienne  : 1965  MRN: [id number]    Date of Service: 2022    Discharge Recommendations:  NITA Etienne scored a 16/24 on the AM-PAC ADL Inpatient form. Current research shows that an AM-PAC score of 17 or less is typically not associated with a discharge to the patient's home setting. Based on the patient's AM-PAC score and their current ADL deficits, it is recommended that the patient have 3-5 sessions per week of Occupational Therapy at d/c to increase the patient's independence. Please see assessment section for further patient specific details. If patient discharges prior to next session this note will serve as a discharge summary. Please see below for the latest assessment towards goals. OT Equipment Recommendations  Other: defer to next level of care      Patient Diagnosis(es): The primary encounter diagnosis was Altered mental status, unspecified altered mental status type. Diagnoses of MALENA (acute kidney injury) ([facility] Urinary tract infection without hematuria, site unspecified, and Low back pain without sciatica, unspecified back pain laterality, unspecified chronicity were also pertinent to this visit. Assessment    Assessment: Able to tolerate EOB, standing, transfer, and short distance mobillity today. Pt highly anxious regarding mobility and anticipated pain w/ movement. Min A for bed mobility, CGA for functional transfers/mobility in room. Continues to require hands on assist for all mobility. Pt will benefit from ongoing IP OT upon discharge. If home, will require 24 hr A, shower chair, ? AE for LB ADLs. Continue per POC and progress as tolerated. Other: defer to next level of care      Plan   Plan  Times per Week: 2-5  Times per Day: Daily  Current Treatment Recommendations: Endurance training;Functional mobility training;Self-Care / ADL; Safety education & training Restrictions  Position Activity Restriction  Other position/activity restrictions: Up with assist    Subjective   Subjective  Subjective: In bed on arrival. Pt expressing apprehensive to move. \"I've got so much on my mind. I find a position and I'm okay. \" \"I just want to focus on getting these phone calls to figure things out. \" Agreeable to OOB w/ encouragement.     Pain: c/o low back pain - nurse informed of request for pain medication, not rated (increased w/ transitions)              Objective         Bed Mobility Training  Bed Mobility Training: Yes  Rolling: Contact-guard assistance (cues for log roll)  Supine to Sit: Minimum assistance (cues for log roll technique for improved body mechanics)  Sit to Supine: Minimum assistance    Balance  Sitting: High guard  Sitting - Static: Unsupported (tolerated x ~1 minute before standing; tolerated ~7 minutes of supported sitting in chair before returning to bed)  Standing: High guard (CGA w/ UE support of wh walker)    Transfer Training  Transfer Training: Yes  Overall Level of Assistance: Contact-guard assistance  Interventions: Verbal cues  Sit to Stand: Contact-guard assistance  Stand to Sit: Contact-guard assistance  Bed to Chair: Contact-guard assistance (bed to chair using wh walker)    Functional Mobility  Overall Level of Assistance: Contact-guard assistance (~40' in room using wh walker; anxious)  Interventions: Safety awareness training;Verbal cues  Assistive Device: Walker, rolling        ADL  LE Dressing: Modified independent  (socks via figure four method in supine position)  Toileting: Dependent/Total (purewick; offered BSC - declined)           Safety Devices  Type of Devices: Left in bed;Bed alarm in place;Call light within reach;Nurse notified     Patient Education  Education Given To: Patient  Education Provided: Role of Therapy;Plan of Care;ADL Adaptive Strategies;Transfer Training  Education Provided Comments: educated regarding the importance of OOB/mobility for improved functional outcomes -will need encouragement  Education Method: Verbal  Barriers to Learning:  (anxiety)  Education Outcome: Verbalized understanding       Goals  Short Term Goals  Time Frame for Short term goals: discharge  Short Term Goal 1: pt to do grooming/hygiene tasks indep, after set up (MET 5/16)  Short Term Goal 2: pt to roll indep (not met)  Short Term Goal 3: pt to move supine><sit with SBA/S (not met)  Short Term Goal 4: pt to tolerate sitting on side of be for 1-2 minutes with SBA, to increase ADLs (ongoing)  Short Term Goal 5: pt to participate in transfer assessment to bedside commode (MET 5/16); revised goal: improve sit to stand transfers to SBA (not met)  Additional Goals?: Yes  Patient Goals   Patient goals : to get out of bed, walk       Therapy Time   Individual Concurrent Group Co-treatment   Time In 1359         Time Out 1443         Minutes 44                 Leti Navarro OTR/L #[id number]

## 2022-05-16 NOTE — PROGRESS NOTES
ID Follow-up NOTE    CC:   Staphylococcus aureus bacteremia, Thoracic osteo-diskitis  Antibiotics Ancef    Admit Date: 5/5/2022  Hospital Day: 12    Subjective:     Patient c/o LBP, 'middle' of back, no radiation. Able to get up with PT  No urinary complaints    Objective:     Patient Vitals for the past 8 hrs:   BP Temp Temp src Pulse Resp SpO2   05/16/22 1213 101/66 97.8 °F (36.6 °C) Oral -- 18 98 %   05/16/22 0928 (!) 91/56 97.5 °F (36.4 °C) Oral 66 16 95 %     I/O last 3 completed shifts: In: 1080 [P.O.:1080]  Out: 700 [Urine:700]  No intake/output data recorded. EXAM:  GENERAL: No apparent distress.   RA  HEENT: Membranes moist, no oral lesion  NECK:  Supple, no lymphadenopathy - trach in place, capped  LUNGS: Clear b/l, no rales, no dullness  CARDIAC: RRR, no murmur appreciated  ABD:  + BS, soft / NT  EXT:  No rash, no edema, no lesions  NEURO: No focal neurologic findings  PSYCH: Orientation, sensorium, mood normal  LINES:  L PICC in place       Data Review:  Lab Results   Component Value Date    WBC 5.1 05/16/2022    HGB 11.3 (L) 05/16/2022    HCT 34.0 (L) 05/16/2022    MCV 86.4 05/16/2022    PLT 81 (L) 05/16/2022     Lab Results   Component Value Date    CREATININE 0.6 05/16/2022    BUN 4 (L) 05/16/2022     05/16/2022    K 3.1 (L) 05/16/2022     05/16/2022    CO2 22 05/16/2022       Hepatic Function Panel:   Lab Results   Component Value Date    ALKPHOS 167 05/16/2022    ALT 7 05/16/2022    AST 32 05/16/2022    PROT 7.3 05/16/2022    BILITOT 1.1 05/16/2022    BILIDIR 0.3 05/05/2022    IBILI 1.2 05/05/2022    LABALBU 3.5 05/16/2022       MICRO:  5/5 BC x 1 S aureus   Antibiotic Interpretation Microscan    clindamycin Sensitive <=0.25 mcg/mL   erythromycin Resistant >=8 mcg/mL   oxacillin Sensitive <=0.25 mcg/mL   tetracycline Sensitive <=1 mcg/mL   trimethoprim-sulfamethoxazole Sensitive <=10 mcg/mL     5/5 Urine cult >100k E coli, K pneumoniae  Klebsiella pneumoniae (2)  Antibiotic Interpretation Microscan    amoxicillin-clavulanate Sensitive <=8/4 mcg/mL   ampicillin Resistant >16 mcg/mL   ampicillin-sulbactam Sensitive <=8/4 mcg/mL   ceFAZolin Sensitive <=2 mcg/mL   cefepime Sensitive <=2 mcg/mL   cefTRIAXone Sensitive <=1 mcg/mL   cefuroxime Intermediate 16 mcg/mL   ciprofloxacin Sensitive <=1 mcg/mL   ertapenem Sensitive <=0.5 mcg/mL   gentamicin Sensitive <=4 mcg/mL   meropenem Sensitive <=1 mcg/mL   nitrofurantoin Resistant >64 mcg/mL   piperacillin-tazobactam Sensitive <=16 mcg/mL   trimethoprim-sulfamethoxazole Sensitive <=2/38 mcg/mL     Escherichia coli (1)  Antibiotic Interpretation Microscan    ampicillin Sensitive <=8 mcg/mL   ampicillin-sulbactam Sensitive <=8/4 mcg/mL   ceFAZolin Sensitive <=2 mcg/mL   cefepime Sensitive <=2 mcg/mL   cefTRIAXone Sensitive <=1 mcg/mL   cefuroxime Sensitive 8 mcg/mL   ciprofloxacin Sensitive <=1 mcg/mL   ertapenem Sensitive <=0.5 mcg/mL   gentamicin Sensitive <=4 mcg/mL   meropenem Sensitive <=1 mcg/mL   nitrofurantoin Sensitive <=32 mcg/mL   piperacillin-tazobactam Sensitive <=16 mcg/mL   trimethoprim-sulfamethoxazole Sensitive <=2/38 mcg/mL      BC x 2 no growth     IMAGIN/9 Lumbar MRI w/wo contrast  Discitis and osteomyelitis at T9-10 along with a small epidural abscess/phlegmon and paraspinal inflammatory change     Thoracic MRI w/wo contrast  Discitis and osteomyelitis at T9-10 along with a small epidural abscess/phlegmon and paraspinal inflammatory       Scheduled Meds:   spironolactone  50 mg Oral Daily    ceFAZolin  2,000 mg IntraVENous Q8H    sodium chloride flush  5-40 mL IntraVENous 2 times per day    lactulose  10 g Oral Daily    rifAXIMin  550 mg Oral BID    miconazole   Topical BID    lidocaine  1 patch TransDERmal Daily    metoprolol succinate  25 mg Oral Daily    CENTRUM/CERTA-MARJAN with minerals oral  15 mL Oral Daily    vitamin D  5,000 Units Oral Daily    sodium chloride flush  5-40 mL IntraVENous 2 times per day    enoxaparin  40 mg SubCUTAneous Daily    pantoprazole  40 mg IntraVENous BID    insulin lispro  0-12 Units SubCUTAneous TID     insulin lispro  0-6 Units SubCUTAneous Nightly       Continuous Infusions:   sodium chloride 25 mL (05/14/22 0621)    sodium chloride 20 mL/hr (05/16/22 1541)    dextrose         PRN Meds:  sodium chloride, Calamine, melatonin, sodium chloride flush, sodium chloride, calcium carbonate, oxyCODONE, ipratropium-albuterol, sodium chloride flush, sodium chloride, ondansetron **OR** ondansetron, polyethylene glycol, glucagon (rDNA), dextrose, dextrose bolus, glucose      Assessment:     Hx DM, cirrhosis  Hx severe COVID-19 pneumonia with resp failure, trach  Lives at nursing facility    Encephalopathy     MSSA bacteremia, f/u BC no growth  T9/10 osteo-diskitis - reviewed MRI 5/9    UTI vs bacteriuria - urine cult - E coli, Klebsiella (both S cefazolin)    Plan:     Cont ancef   See SAVANNAH - if goes home, can give ceftriaxone which is one a day    Medical Decision Making:   The following items were considered in medical decision making:  Discussion of patient care with other providers  Reviewed clinical lab tests  Reviewed radiology tests  Reviewed other diagnostic tests/interventions  Independent review of radiologic images - reviewed MRI with Radiologist 5/9  Microbiology cultures and other micro tests reviewed      Discussed with pt  Stephanie Stevens MD    INFUSION ORDERS: modified 5/13  Ceftriaxone 2 gm iv daily through 6/22  - Diagnosis - S aureus bacteremia, thoracic diskitis / vertebral osrteomyelitis  - First dose given in hospital  - PICC   - Disposition / date discharge  - Check CBC w diff, CMP, ESR, CRP every Mon or [place] [facility] result to [phone]  - Call with antibiotic / infusion issues, [phone]  - Call with any change in status, transfer in or out of a facility or to hospital - [phone]  - No f/u in outpatient ID office necessary

## 2022-05-16 NOTE — CARE COORDINATION
CM called [facility] [phone] to provide medicaid info:    Confirmation #: [id number]    Application#: [id number]        Electronically signed by YOLA Kim LSW on 5/16/2022 at 4:47 PM  ____________________________________________________________________    Linda Kay met with pt and CM at bedside to complete medicaid application. Linda Kay will notify CM when she receives a pending medicaid number, which is needed for placement. Electronically signed by YOLA Kim LSW on 5/16/2022 at 1:36 PM  ____________________________________________________________________    CM spoke with Linda Kay in finance to follow up about meeting with pt today to complete medicaid application. She confirmed that she will meet with pt today, and then CM can pursue LTC placement with [facility].         Electronically signed by YOLA Kim LSW on 5/16/2022 at 11:02 AM

## 2022-05-16 NOTE — PROGRESS NOTES
Physical Therapy  Facility/Department: [address]  Daily Treatment Note  NAME: NITA Etienne  : 1965  MRN: [id number]    Date of Service: 2022    Discharge Recommendations:  NITA Etienne scored a 16/24 on the AM-PAC short mobility form. Current research shows that an AM-PAC score of 17 or less is typically not associated with a discharge to the patient's home setting. Based on the patient's AM-PAC score and their current functional mobility deficits, it is recommended that the patient have 3-5 sessions per week of Physical Therapy at d/c to increase the patient's independence. Please see assessment section for further patient specific details. If patient discharges prior to next session this note will serve as a discharge summary. Please see below for the latest assessment towards goals. Patient would benefit from continued therapy after discharge   PT Equipment Recommendations  Equipment Needed: No (defer)    Patient Diagnosis(es): The primary encounter diagnosis was Altered mental status, unspecified altered mental status type. Diagnoses of MALENA (acute kidney injury) ([facility] Urinary tract infection without hematuria, site unspecified, and Low back pain without sciatica, unspecified back pain laterality, unspecified chronicity were also pertinent to this visit. Assessment   Assessment: Pt progressing with mobility. Remains limited by back pain. Recommend further inpt PT upon D/C. Will follow per plan of care. Activity Tolerance: Patient limited by pain  Equipment Needed: No (defer)     Plan    Plan  Plan:  (2-5)  Current Treatment Recommendations: Transfer training;Functional mobility training;Gait training;Strengthening; Endurance training; Safety education & training     Restrictions  Position Activity Restriction  Other position/activity restrictions: Up with assist     Subjective    Subjective  Subjective: Pt supine in bed & willing to participate with max encouragement.   Pain: pt reports no pain at rest & up to 10/10 with all movement & sitting in chair. RN aware. pt not due for pain meds. Orientation  Overall Orientation Status: Within Functional Limits (oriented w/ conversation; highly anxious w/ all mobility)     Objective   Vitals     Bed Mobility Training  Bed Mobility Training: Yes  Rolling: Contact-guard assistance (cues for log roll)  Supine to Sit: Minimum assistance (HOB slightly elevated, logroll, cues required.)  Sit to Supine: Minimum assistance (HOB flat, logroll, cues required.)  Scooting: Stand-by assistance (seated & supine using rails)  Balance  Sitting: High guard  Sitting - Static: Unsupported (tolerated x ~1 minute before standing; tolerated ~7 minutes of supported sitting in chair before returning to bed)  Standing: High guard (CGA w/ UE support of wh walker)  Transfer Training  Transfer Training: Yes  Overall Level of Assistance: Contact-guard assistance  Interventions: Verbal cues  Sit to Stand: Contact-guard assistance (from bed & chair.)  Stand to Sit: Contact-guard assistance  Stand Pivot Transfers: Contact-guard assistance (bed->chair with RW)  Bed to Chair: Contact-guard assistance (bed to chair using wh walker)  Gait Training  Gait Training: Yes (pt amb 40 ft with RW & CGA. no LOB. distance limited by pain.)  Gait  Overall Level of Assistance: Contact-guard assistance (~40' in room using wh walker; anxious)  Interventions: Safety awareness training;Verbal cues  Assistive Device: Walker, rolling        Other Specialty Interventions  Other Treatments/Modalities: pt only able to sit in chair ~ 8min, then requesting to return to bed d/t pain. pt assisted back to supine.   Safety Devices  Type of Devices: Left in bed;Bed alarm in place;Call light within reach;Nurse notified       Goals  Short Term Goals  Time Frame for Short term goals: Discharge  Short term goal 1: supine <> sit supervision  Short term goal 2: Tolerate sitting activity x5 min with supervision for balance  Patient Goals   Patient goals : Ultimate goal is to return home    Education  Patient Education  Education Given To: Patient  Education Provided: Plan of Care;Transfer Training  Education Method: Verbal  Education Outcome: Verbalized understanding;Demonstrated understanding;Continued education needed    Therapy Time   Individual Concurrent Group Co-treatment   Time In 1359         Time Out 1441         Minutes 110 Wolfgang Pappas, PT

## 2022-05-16 NOTE — PLAN OF CARE
Problem: Pain  Goal: Verbalizes/displays adequate comfort level or baseline comfort level  Outcome: Progressing   Medicated with Oxycodone 10 mg po for generalize pain. Zofran mg ivp for nausea. Vital signs stable, afebrile. Will continue to monitor for alteration in comfort.

## 2022-05-17 VITALS
HEIGHT: 66 IN | WEIGHT: 203.48 LBS | HEART RATE: 67 BPM | SYSTOLIC BLOOD PRESSURE: 117 MMHG | TEMPERATURE: 97.9 F | DIASTOLIC BLOOD PRESSURE: 64 MMHG | BODY MASS INDEX: 32.7 KG/M2 | OXYGEN SATURATION: 98 % | RESPIRATION RATE: 18 BRPM

## 2022-05-17 LAB
A/G RATIO: 1 (ref 1.1–2.2)
ALBUMIN SERPL-MCNC: 3.3 G/DL (ref 3.4–5)
ALP BLD-CCNC: 191 U/L (ref 40–129)
ALT SERPL-CCNC: 8 U/L (ref 10–40)
ANION GAP SERPL CALCULATED.3IONS-SCNC: 11 MMOL/L (ref 3–16)
AST SERPL-CCNC: 40 U/L (ref 15–37)
BASOPHILS ABSOLUTE: 0 K/UL (ref 0–0.2)
BASOPHILS RELATIVE PERCENT: 0.4 %
BILIRUB SERPL-MCNC: 1.1 MG/DL (ref 0–1)
BUN BLDV-MCNC: 4 MG/DL (ref 7–20)
CALCIUM SERPL-MCNC: 8.5 MG/DL (ref 8.3–10.6)
CHLORIDE BLD-SCNC: 103 MMOL/L (ref 99–110)
CO2: 22 MMOL/L (ref 21–32)
CREAT SERPL-MCNC: 0.6 MG/DL (ref 0.6–1.1)
EOSINOPHILS ABSOLUTE: 0.2 K/UL (ref 0–0.6)
EOSINOPHILS RELATIVE PERCENT: 2.5 %
GFR AFRICAN AMERICAN: >60
GFR NON-AFRICAN AMERICAN: >60
GLUCOSE BLD-MCNC: 118 MG/DL (ref 70–99)
GLUCOSE BLD-MCNC: 136 MG/DL (ref 70–99)
GLUCOSE BLD-MCNC: 142 MG/DL (ref 70–99)
GLUCOSE BLD-MCNC: 209 MG/DL (ref 70–99)
HCT VFR BLD CALC: 32 % (ref 36–48)
HEMOGLOBIN: 10.7 G/DL (ref 12–16)
LYMPHOCYTES ABSOLUTE: 1.2 K/UL (ref 1–5.1)
LYMPHOCYTES RELATIVE PERCENT: 20 %
MAGNESIUM: 1 MG/DL (ref 1.8–2.4)
MCH RBC QN AUTO: 28.4 PG (ref 26–34)
MCHC RBC AUTO-ENTMCNC: 33.5 G/DL (ref 31–36)
MCV RBC AUTO: 84.9 FL (ref 80–100)
MONOCYTES ABSOLUTE: 0.6 K/UL (ref 0–1.3)
MONOCYTES RELATIVE PERCENT: 10.7 %
NEUTROPHILS ABSOLUTE: 4 K/UL (ref 1.7–7.7)
NEUTROPHILS RELATIVE PERCENT: 66.4 %
PDW BLD-RTO: 14.8 % (ref 12.4–15.4)
PERFORMED ON: ABNORMAL
PLATELET # BLD: 77 K/UL (ref 135–450)
PMV BLD AUTO: 8.7 FL (ref 5–10.5)
POTASSIUM REFLEX MAGNESIUM: 3.2 MMOL/L (ref 3.5–5.1)
RBC # BLD: 3.77 M/UL (ref 4–5.2)
SODIUM BLD-SCNC: 136 MMOL/L (ref 136–145)
TOTAL PROTEIN: 6.7 G/DL (ref 6.4–8.2)
WBC # BLD: 6 K/UL (ref 4–11)

## 2022-05-17 PROCEDURE — 99232 SBSQ HOSP IP/OBS MODERATE 35: CPT | Performed by: INTERNAL MEDICINE

## 2022-05-17 PROCEDURE — 80053 COMPREHEN METABOLIC PANEL: CPT

## 2022-05-17 PROCEDURE — 97535 SELF CARE MNGMENT TRAINING: CPT

## 2022-05-17 PROCEDURE — 94761 N-INVAS EAR/PLS OXIMETRY MLT: CPT

## 2022-05-17 PROCEDURE — 6370000000 HC RX 637 (ALT 250 FOR IP): Performed by: INTERNAL MEDICINE

## 2022-05-17 PROCEDURE — 83735 ASSAY OF MAGNESIUM: CPT

## 2022-05-17 PROCEDURE — 6360000002 HC RX W HCPCS: Performed by: INTERNAL MEDICINE

## 2022-05-17 PROCEDURE — 2580000003 HC RX 258: Performed by: INTERNAL MEDICINE

## 2022-05-17 PROCEDURE — 85025 COMPLETE CBC W/AUTO DIFF WBC: CPT

## 2022-05-17 PROCEDURE — C9113 INJ PANTOPRAZOLE SODIUM, VIA: HCPCS | Performed by: INTERNAL MEDICINE

## 2022-05-17 RX ORDER — PANTOPRAZOLE SODIUM 40 MG/1
40 TABLET, DELAYED RELEASE ORAL 2 TIMES DAILY
Status: DISCONTINUED | OUTPATIENT
Start: 2022-05-17 | End: 2022-05-17 | Stop reason: HOSPADM

## 2022-05-17 RX ADMIN — SODIUM CHLORIDE, PRESERVATIVE FREE 10 ML: 5 INJECTION INTRAVENOUS at 09:17

## 2022-05-17 RX ADMIN — RIFAXIMIN 550 MG: 550 TABLET ORAL at 09:15

## 2022-05-17 RX ADMIN — METOPROLOL SUCCINATE 25 MG: 25 TABLET, FILM COATED, EXTENDED RELEASE ORAL at 09:15

## 2022-05-17 RX ADMIN — INSULIN LISPRO 4 UNITS: 100 INJECTION, SOLUTION INTRAVENOUS; SUBCUTANEOUS at 09:27

## 2022-05-17 RX ADMIN — CEFAZOLIN 2000 MG: 2 INJECTION, POWDER, FOR SOLUTION INTRAMUSCULAR; INTRAVENOUS at 15:03

## 2022-05-17 RX ADMIN — PANTOPRAZOLE SODIUM 40 MG: 40 INJECTION, POWDER, FOR SOLUTION INTRAVENOUS at 09:17

## 2022-05-17 RX ADMIN — ENOXAPARIN SODIUM 40 MG: 100 INJECTION SUBCUTANEOUS at 09:21

## 2022-05-17 RX ADMIN — MICONAZOLE NITRATE: 20 CREAM TOPICAL at 09:26

## 2022-05-17 RX ADMIN — OXYCODONE 10 MG: 5 TABLET ORAL at 06:57

## 2022-05-17 RX ADMIN — SPIRONOLACTONE 50 MG: 50 TABLET ORAL at 09:15

## 2022-05-17 RX ADMIN — OXYCODONE 10 MG: 5 TABLET ORAL at 03:02

## 2022-05-17 RX ADMIN — OXYCODONE 10 MG: 5 TABLET ORAL at 15:03

## 2022-05-17 RX ADMIN — ONDANSETRON 4 MG: 2 INJECTION INTRAMUSCULAR; INTRAVENOUS at 09:17

## 2022-05-17 RX ADMIN — Medication 5000 UNITS: at 09:15

## 2022-05-17 RX ADMIN — CEFAZOLIN 2000 MG: 2 INJECTION, POWDER, FOR SOLUTION INTRAMUSCULAR; INTRAVENOUS at 06:15

## 2022-05-17 RX ADMIN — OXYCODONE 10 MG: 5 TABLET ORAL at 11:19

## 2022-05-17 RX ADMIN — Medication 15 ML: at 09:21

## 2022-05-17 ASSESSMENT — PAIN SCALES - GENERAL
PAINLEVEL_OUTOF10: 8
PAINLEVEL_OUTOF10: 9
PAINLEVEL_OUTOF10: 8
PAINLEVEL_OUTOF10: 9
PAINLEVEL_OUTOF10: 7
PAINLEVEL_OUTOF10: 7

## 2022-05-17 ASSESSMENT — PAIN DESCRIPTION - LOCATION
LOCATION: ABDOMEN
LOCATION: BACK
LOCATION: ABDOMEN

## 2022-05-17 ASSESSMENT — PAIN DESCRIPTION - ORIENTATION
ORIENTATION: MID
ORIENTATION: MID
ORIENTATION: LOWER

## 2022-05-17 ASSESSMENT — PAIN DESCRIPTION - DESCRIPTORS
DESCRIPTORS: THROBBING
DESCRIPTORS: ACHING
DESCRIPTORS: THROBBING

## 2022-05-17 ASSESSMENT — PAIN DESCRIPTION - PAIN TYPE
TYPE: ACUTE PAIN
TYPE: ACUTE PAIN

## 2022-05-17 ASSESSMENT — PAIN DESCRIPTION - ONSET
ONSET: ON-GOING
ONSET: ON-GOING

## 2022-05-17 ASSESSMENT — PAIN DESCRIPTION - FREQUENCY
FREQUENCY: CONTINUOUS
FREQUENCY: CONTINUOUS

## 2022-05-17 NOTE — PROGRESS NOTES
Occupational Therapy  Facility/Department: [address]  Daily Treatment Note  NAME: NITA Etienne  : 1965  MRN: [id number]    Date of Service: 2022    Discharge Recommendations:  NITA Etienne scored a 16/24 on the AM-PAC ADL Inpatient form. Current research shows that an AM-PAC score of 17 or less is typically not associated with a discharge to the patient's home setting. Based on the patient's AM-PAC score and their current ADL deficits, it is recommended that the patient have 3-5 sessions per week of Occupational Therapy at d/c to increase the patient's independence. Please see assessment section for further patient specific details. If patient discharges prior to next session this note will serve as a discharge summary. Please see below for the latest assessment towards goals. OT Equipment Recommendations  Other: defer to next level of care      Patient Diagnosis(es): The primary encounter diagnosis was Altered mental status, unspecified altered mental status type. Diagnoses of MALENA (acute kidney injury) ([facility] [address] Urinary tract infection without hematuria, site unspecified, and Low back pain without sciatica, unspecified back pain laterality, unspecified chronicity were also pertinent to this visit. Diagnosis: Pt admitted with AMS, dx of acute metabolic encephalopathy secondary to hepatic encephalopathy, MALENA, UTI-CXR=neg, head CT=neg, abd US=no ascities, MRI thoracic and lumbar spine=discitis and osteomyelitis T9-10, small epidural abscess, diffuse disc buldge L3-4  Assessment    Assessment: Pt declined doing any bed mobility, transfers, or functional mobility secondary to reported pain last night after doing mobility yesterday. She tolerated grooming/hygiene tasks in bed today, declined UE exerc. Recommend continued inpatient OT at discharge, to increase functional status.   Activity Tolerance: Patient limited by pain  Other: defer to next level of care      Plan   Plan  Times per Week: 2-5  Current Treatment Recommendations: Endurance training;Functional mobility training;Self-Care / ADL; Safety education & training     Restrictions  Position Activity Restriction  Other position/activity restrictions: Up with assist    Subjective   Subjective  Subjective: Pt in bed, reporting she is very anxious about leaving today. Pt declined any out of bed tasks because of pain, but agreeable to do tasks in bed. Pain: c/o low back pain - nurse informed of request for pain medication, not rated  Cognition  Overall Cognitive Status: Exceptions  Following Commands: Follows one step commands with increased time  Attention Span: Difficulty attending to directions; Attends with cues to redirect  Safety Judgement: Decreased awareness of need for assistance  Problem Solving: Assistance required to generate solutions;Decreased awareness of errors  Initiation: Requires cues for all  Sequencing: Requires cues for some  Cognition Comment: reports feeling anxious        Objective    Vitals           ADL  Grooming: Setup (while in bed, after set up-indep using swap/mouth wash for mouth care)  UE Bathing Skilled Clinical Factors: max A using shampoo cap, and drying hair in bed-indep brushing hair  OT Exercises  Exercise Treatment: pt declined doing any UE exerc     Safety Devices  Type of Devices: Left in bed;Bed alarm in place;Call light within reach;Nurse notified          Goals  Short Term Goals  Time Frame for Short term goals: discharge  Short Term Goal 1: pt to do grooming/hygiene tasks indep, after set up (MET 5/16)  Short Term Goal 2: pt to roll indep (not met)  Short Term Goal 3: pt to move supine><sit with SBA/S (not met)  Short Term Goal 4: pt to tolerate sitting on side of be for 1-2 minutes with SBA, to increase ADLs (ongoing)  Short Term Goal 5: pt to participate in transfer assessment to bedside commode (MET 5/16); revised goal: improve sit to stand transfers to SBA (not met)  Additional Goals?: Yes  Patient Goals   Patient goals : to get out of bed, walk       Therapy Time   Individual Concurrent Group Co-treatment   Time In 1350         Time Out 1415         Minutes 25           Timed Code Treatment Minutes:   25    Total Treatment Minutes:  [address] LE[address]IDAS Rucker/MARIA DEL ROSARIO Sanchez 19

## 2022-05-17 NOTE — CARE COORDINATION
Case Management Assessment            Discharge Note                    Date / Time of Note: 5/17/2022 12:22 PM                  Discharge Note Completed by: Emelyn Ge RN    Patient Name: Eddie Franco   YOB: 1965  Diagnosis: MALENA (acute kidney injury) ([facility] [address] [N17.9]  Urinary tract infection without hematuria, site unspecified [N39.0]  Altered mental status, unspecified altered mental status type [T51.02]  Acute metabolic encephalopathy [M52.25]   Date / Time: 5/5/2022 11:23 AM    Current PCP: Caitlin Gonzalez, [address] [place] [address] patient: No    Hospitalization in the last 30 days: No    Advance Directives:  Code Status: Full Code  [place] DNR form completed and on chart: No    Financial:  Payor: MEDICARE / Plan: MEDICARE PART A AND B / Product Type: *No Product type* /      Pharmacy:    [address] [id number]  Phone: [phone] Fax: [phone]      Assistance purchasing medications?: Potential Assistance Purchasing Medications: No  Assistance provided by Case Management: None at this time    Does patient want to participate in local refill/ meds to beds program?: No    Meds To Beds General Rules:  1. Can ONLY be done Monday- Friday between 8:30am-5pm  2. Prescription(s) must be in pharmacy by 3pm to be filled same day  3. Copy of patient's insurance/ prescription drug card and patient face sheet must be sent along with the prescription(s)  4. Cost of Rx cannot be added to hospital bill. If financial assistance is needed, please contact unit  or ;  or  CANNOT provide pharmacy voucher for patients co-pays  5.  Patients can then  the prescription on their way out of the hospital at discharge, or pharmacy can deliver to the bedside if staff is available. (payment due at time of pick-up or delivery - cash, check, or card accepted)     Able to afford home medications/ co-pay costs: Yes    ADLS:  Current PT AM-PAC Score: 16 /24  Current OT AM-PAC Score: 16 /24      DISCHARGE Disposition:     [address], [facility] [address]      REPORT Phone: [phone]       Fax: [phone]          LOC at discharge: Skilled  SAVANNAH Completed: Yes    Notification completed in HENS/PAS?:  Yes : CM has completed HENS online through secure website for SNF admission at [facility]  .    Document ID #: Document ID : [id number]    IMM Completed:   Not Indicated    Transportation:  Transportation PLAN for discharge: EMS transportation   Mode of Transport: Ambulance stretcher - BLS  Reason for medical transport: Bed confined: Meets the following criteria 1) unable to get out of bed without assistance or ambulate, 2) unable to safely sit up in a wheelchair, 3) unable to maintain erect seating position in a chair for time needed for transport  Name of Transport Company: [employer]  Phone: [phone]  Time of Transport: 1600    Transport form completed: Yes    Home Care:  [address] ordered at discharge: No  [address]: Not Applicable  Orders faxed: No    Durable Medical Equipment:  DME Provider: defer  Equipment obtained during hospitalization: defer    Home Oxygen and Respiratory Equipment:  Oxygen needed at discharge?: Not [address]: Not Applicable  Portable tank available for discharge?: Not Indicated    Dialysis:  Dialysis patient: No    Dialysis Center:  Not Applicable    Hospice Services:  Location: Not Applicable  Agency: Not Applicable    Consents signed: Not Indicated    Referrals made at [facility] for outpatient continued care:  Not Applicable    Additional CM Notes:     CM confirmed  D/C to  SNF  Today  :  pending  Medicaid  Confirmed  By Skip Him  In admissions and  Able to accept  Later today :    Transport  At  1600  ,  She  Stated  will pull orders  From  Epic  < ROBIN Sanchez to gosia report      HENS submitted      Patient  acknowledgd and in agreement  . The Plan for Transition of Care is related to the following treatment goals of MALENA (acute kidney injury) ([facility] [address] [N17.9]  Urinary tract infection without hematuria, site unspecified [N39.0]  Altered mental status, unspecified altered mental status type [V41.53]  Acute metabolic encephalopathy [N25.76]    The Patient and/or patient representative NITA and her family were provided with a choice of provider and agrees with the discharge plan Yes    Freedom of choice list was provided with basic dialogue that supports the patient's individualized plan of care/goals and shares the quality data associated with the providers.  Yes    Care Transitions patient: No    Adeola Salgado RN  [facility]  Case Management Department  Ph: [phone]

## 2022-05-17 NOTE — PLAN OF CARE
Patient discharged to [facility] per ambulance. PICC line in place and clamped. Report called to nurse. SAVANNAH completed. Patient anxious at time of discharge. Encouraged to think of this as the next step in recovering.

## 2022-05-17 NOTE — PROGRESS NOTES
Nephrology  Note                                                                                                                                                                                                                                                                                                                                                               Office : [phone]     Fax :[phone]              Patient's Name: NITA ALLEN Rehfues    Good uO   Cr better   No N/V/D   Feels better      Past Medical History:   Diagnosis Date    CHF (congestive heart failure) ([address]     Hyperlipidemia     Obesity     Tracheostomy in place [facility])        Past Surgical History:   Procedure Laterality Date    UPPER GASTROINTESTINAL ENDOSCOPY N/A 3/31/2022    EGD BIOPSY performed by Blu Macias MD at [address] reviewed. No pertinent family history. reports that she has quit smoking. She has never used smokeless tobacco. She reports previous alcohol use. She reports that she does not use drugs. Allergies:  Patient has no known allergies.     Current Medications:    spironolactone (ALDACTONE) tablet 50 mg, Daily  ceFAZolin (ANCEF) 2,000 mg in sodium chloride 0.9 % 50 mL IVPB (mini-bag), Q8H  sodium chloride (OCEAN, BABY AYR) 0.65 % nasal spray 1 spray, PRN  Calamine 8-8 % lotion, PRN  melatonin tablet 6 mg, Nightly PRN  sodium chloride flush 0.9 % injection 5-40 mL, 2 times per day  sodium chloride flush 0.9 % injection 5-40 mL, PRN  0.9 % sodium chloride infusion, PRN  calcium carbonate (TUMS) chewable tablet 500 mg, TID PRN  oxyCODONE (ROXICODONE) immediate release tablet 10 mg, Q4H PRN  lactulose (CHRONULAC) 10 GM/15ML solution 10 g, Daily  rifAXIMin (XIFAXAN) tablet 550 mg, BID  miconazole (MICOTIN) 2 % cream, BID  lidocaine 4 % external patch 1 patch, Daily  ipratropium-albuterol (DUONEB) nebulizer solution 3 mL, Q4H PRN  metoprolol succinate (TOPROL XL) extended release tablet 25 mg, Daily  CENTRUM/CERTA-MARJAN with minerals oral solution 15 mL, Daily  vitamin D (CHOLECALCIFEROL) capsule 5,000 Units, Daily  sodium chloride flush 0.9 % injection 5-40 mL, 2 times per day  sodium chloride flush 0.9 % injection 5-40 mL, PRN  0.9 % sodium chloride infusion, PRN  enoxaparin (LOVENOX) injection 40 mg, Daily  ondansetron (ZOFRAN-ODT) disintegrating tablet 4 mg, Q8H PRN   Or  ondansetron (ZOFRAN) injection 4 mg, Q6H PRN  polyethylene glycol (GLYCOLAX) packet 17 g, Daily PRN  pantoprazole (PROTONIX) injection 40 mg, BID  insulin lispro (1 Unit Dial) 0-12 Units, TID WC  insulin lispro (1 Unit Dial) 0-6 Units, Nightly  glucagon (rDNA) injection 1 mg, PRN  dextrose 5 % solution, PRN  dextrose bolus 10% 250 mL, PRN  glucose chewable tablet 16 g, PRN          Physical exam:     Vitals:  /63   Pulse 68   Temp 98.7 °F (37.1 °C) (Oral)   Resp 16   Ht 5' 6\" (1.676 m)   Wt 203 lb 7.8 oz (92.3 kg)   LMP 03/31/1982   SpO2 93%   BMI 32.84 kg/m²   Constitutional:  OAA X3 NAD  Skin: no rash, turgor wnl  Heent:  eomi, mmm  Neck: no bruits or jvd noted  Cardiovascular:  S1, S2 without m/r/g  Respiratory: dec BS   Abdomen:  +bs, soft, nt, nd  Ext: trace lower extremity edema      Data:   Labs:  CBC:   Recent Labs     05/15/22  0705 05/16/22  0611   WBC 4.8 5.1   HGB 11.2* 11.3*   PLT 83* 81*     BMP:    Recent Labs     05/15/22  0705 05/16/22  0611    136   K 3.7 3.1*    102   CO2 25 22   BUN 5* 4*   CREATININE 0.6 0.6   GLUCOSE 103* 103*     Ca/Mg/Phos:   Recent Labs     05/15/22  0705 05/16/22  0611   CALCIUM 9.5 9.1   MG  --  1.10*     Hepatic:   Recent Labs     05/15/22  0705 05/16/22  0611   AST 26 32   ALT <5* 7*   BILITOT 1.4* 1.1*   ALKPHOS 163* 167*     Troponin: No results for input(s): TROPONINI in the last 72 hours. BNP: No results for input(s): BNP in the last 72 hours. Lipids: No results for input(s): CHOL, TRIG, HDL, LDLCALC, LABVLDL in the last 72 hours.   ABGs: No results for input(s): PHART, PO2ART, [id number] in the last 72 hours. INR:   No results for input(s): INR in the last 72 hours. UA:  No results for input(s): Dominic Maryuri, GLUCOSEU, BILIRUBINUR, KETUA, SPECGRAV, BLOODU, PHUR, PROTEINU, UROBILINOGEN, NITRU, LEUKOCYTESUR, [place] in the last 72 hours. Urine Microscopic:   No results for input(s): LABCAST, BACTERIA, COMU, HYALCAST, WBCUA, RBCUA, EPIU in the last 72 hours. Urine Culture:   No results for input(s): LABURIN in the last 72 hours. Urine Chemistry: No results for input(s): Zuleta Gitelman, PROTEINUR, NAUR in the last 72 hours. IMAGING:  MRI THORACIC SPINE W WO CONTRAST   Final Result      Discitis and osteomyelitis at T9-10 along with a small epidural abscess/phlegmon and paraspinal inflammatory change. MRI LUMBAR SPINE W WO CONTRAST   Final Result      Discitis and osteomyelitis at T9-10 along with a small epidural abscess/phlegmon and paraspinal inflammatory change. US ABDOMEN LIMITED   Final Result      No ascites identified               CT HEAD WO CONTRAST   Final Result      No acute intracranial pathology                 XR CHEST PORTABLE   Final Result      No acute cardiopulmonary disease. Assessment/Plan   1. Encephalopathy  resolved    2. Alc cirrhosis     3. Anemia    4. Acid- base/ Electrolyte imbalance     5.  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

## 2022-05-17 NOTE — PROGRESS NOTES
Urine cult >100k E coli, K pneumoniae  Klebsiella pneumoniae (2)  Antibiotic Interpretation Microscan    amoxicillin-clavulanate Sensitive <=8/4 mcg/mL   ampicillin Resistant >16 mcg/mL   ampicillin-sulbactam Sensitive <=8/4 mcg/mL   ceFAZolin Sensitive <=2 mcg/mL   cefepime Sensitive <=2 mcg/mL   cefTRIAXone Sensitive <=1 mcg/mL   cefuroxime Intermediate 16 mcg/mL   ciprofloxacin Sensitive <=1 mcg/mL   ertapenem Sensitive <=0.5 mcg/mL   gentamicin Sensitive <=4 mcg/mL   meropenem Sensitive <=1 mcg/mL   nitrofurantoin Resistant >64 mcg/mL   piperacillin-tazobactam Sensitive <=16 mcg/mL   trimethoprim-sulfamethoxazole Sensitive <=2/38 mcg/mL     Escherichia coli (1)  Antibiotic Interpretation Microscan    ampicillin Sensitive <=8 mcg/mL   ampicillin-sulbactam Sensitive <=8/4 mcg/mL   ceFAZolin Sensitive <=2 mcg/mL   cefepime Sensitive <=2 mcg/mL   cefTRIAXone Sensitive <=1 mcg/mL   cefuroxime Sensitive 8 mcg/mL   ciprofloxacin Sensitive <=1 mcg/mL   ertapenem Sensitive <=0.5 mcg/mL   gentamicin Sensitive <=4 mcg/mL   meropenem Sensitive <=1 mcg/mL   nitrofurantoin Sensitive <=32 mcg/mL   piperacillin-tazobactam Sensitive <=16 mcg/mL   trimethoprim-sulfamethoxazole Sensitive <=2/38 mcg/mL      BC x 2 no growth     IMAGIN/9 Lumbar MRI w/wo contrast  Discitis and osteomyelitis at T9-10 along with a small epidural abscess/phlegmon and paraspinal inflammatory change     Thoracic MRI w/wo contrast  Discitis and osteomyelitis at T9-10 along with a small epidural abscess/phlegmon and paraspinal inflammatory       Scheduled Meds:   spironolactone  50 mg Oral Daily    ceFAZolin  2,000 mg IntraVENous Q8H    sodium chloride flush  5-40 mL IntraVENous 2 times per day    lactulose  10 g Oral Daily    rifAXIMin  550 mg Oral BID    miconazole   Topical BID    lidocaine  1 patch TransDERmal Daily    metoprolol succinate  25 mg Oral Daily    CENTRUM/CERTA-MARJAN with minerals oral  15 mL Oral Daily    vitamin D  5,000 Units Oral Daily    sodium chloride flush  5-40 mL IntraVENous 2 times per day    enoxaparin  40 mg SubCUTAneous Daily    pantoprazole  40 mg IntraVENous BID    insulin lispro  0-12 Units SubCUTAneous TID     insulin lispro  0-6 Units SubCUTAneous Nightly       Continuous Infusions:   sodium chloride 25 mL (05/14/22 0621)    sodium chloride 20 mL/hr (05/16/22 1541)    dextrose         PRN Meds:  sodium chloride, Calamine, melatonin, sodium chloride flush, sodium chloride, calcium carbonate, oxyCODONE, ipratropium-albuterol, sodium chloride flush, sodium chloride, ondansetron **OR** ondansetron, polyethylene glycol, glucagon (rDNA), dextrose, dextrose bolus, glucose      Assessment:     Hx DM, cirrhosis  Hx severe COVID-19 pneumonia with resp failure, trach  Lives at nursing facility    Encephalopathy     MSSA bacteremia, f/u BC no growth  T9/10 osteo-diskitis - reviewed MRI 5/9    UTI vs bacteriuria - urine cult - E coli, Klebsiella (both S cefazolin)    Plan:     Cont ancef   See SAVANNAH  Medical Decision Making:   The following items were considered in medical decision making:  Discussion of patient care with other providers  Reviewed clinical lab tests  Reviewed radiology tests  Reviewed other diagnostic tests/interventions  Independent review of radiologic images - reviewed MRI with Radiologist 5/9  Microbiology cultures and other micro tests reviewed      Discussed with pt  Eula Christina MD    INFUSION ORDERS: modified 5/13  Ceftriaxone 2 gm iv daily through 6/22  - Diagnosis - S aureus bacteremia, thoracic diskitis / vertebral osrteomyelitis  - First dose given in hospital  - PICC   - Disposition / date discharge  - Check CBC w diff, CMP, ESR, CRP every Mon or [place] [address] result to [phone]  - Call with antibiotic / infusion issues, [phone]  - Call with any change in status, transfer in or out of a facility or to hospital - [phone]  - No f/u in outpatient ID office necessary

## 2022-05-17 NOTE — PROGRESS NOTES
Pharmacy Progress Note     Patient is on pantoprazole 40 mg IV BID. As patient is tolerating other oral medications, has no S/Sx of active GI bleeding, and Hg/Hct is stable, will change to oral pantoprazole per DPD IV to PO policy. Lab Results   Component Value Date    WBC 6.0 05/17/2022    HGB 10.7 (L) 05/17/2022    HCT 32.0 (L) 05/17/2022    MCV 84.9 05/17/2022    PLT 77 (L) 05/17/2022       Patient Selection for IV to PO Conversion   A. Able to tolerate oral / NG medications, diet, or tube feeding   B. Exhibits signs of clinical improvement specific to the drug therapy to be switched   C. Professional judgement of the pharmacist indicates that PO medications are appropriate for the patient   D. Exclusion Criteria  NPO  Not tolerating feeding (e.g., nausea, vomiting, diarrhea, large residuals on tube feeding)  Continuous NG suctioning  Active GI bleed        Please call with any questions.   Serafin Leggett PharmD  Office: [phone]  Wireless: [phone]  5/17/2022 1:44 PM

## 2022-05-18 NOTE — DISCHARGE SUMMARY
Hospital Discharge Summary    Patient's PCP: Samantha Garcia, ANNIA - CNP  Admit Date: 5/5/2022   Discharge Date: 5/17/2022    Admitting Physician: Olive Bruno DO  Discharge Physician: Paul Wright MD       Pt has been awaiting pending medicaid number for several days, placement has been pending based on obtaining this number. Number obtained today, arrangements made already and patent discharged. Discharge Diagnoses:       MSSA bacteremia with T9-T10 osteomyelitis/discitis with epidural abscess  Echo negative for vegetation  PICC placed, on Ancef, ID input noticed     Bacteriuria versus UTI  Ancef will cover discussed with ID     MALENA improved  Continue to hold spironolactone and torsemide on discharge for now     Peptic ulcer disease  March EGD showing 3 clean-based antral ulcers and duodenal ulcers continue on Protonix twice daily on discharge        Alcoholic cirrhosis hepatitis C antibody negative hepatitis B negative  On lactulose and rifaximin     Diabetes mellitus with neuropathy  Chronic diastolic heart failure  Bipolar on Seroquel      Patient Active Problem List   Diagnosis    Intractable abdominal pain    Acute metabolic encephalopathy    Hyperammonemia (HCC)    Hyponatremia    Azotemia    MALENA (acute kidney injury) ([facility] [address]    Altered mental status       Physical Exam: /64   Pulse 67   Temp 97.9 °F (36.6 °C) (Axillary)   Resp 18   Ht 5' 6\" (1.676 m)   Wt 203 lb 7.8 oz (92.3 kg)   LMP 03/31/1982   SpO2 98%   BMI 32.84 kg/m²   Recent Labs     05/16/22  1626 05/16/22  2025 05/17/22  0655 05/17/22  0732 05/17/22  1112   POCGLU 94 139* 142* 209* 118*       General appearance:  Appears comfortable  Eyes: Sclera clear. Pupils equal.  ENT: Moist oral mucosa. Trachea midline, no adenopathy. Cardiovascular: Regular rhythm, normal S1, S2. No murmur. No edema in lower extremities  Respiratory: Not using accessory muscles. Good inspiratory effort.  Clear to auscultation bilaterally, no wheeze or crackles. GI: Abdomen soft, no tenderness, not distended, normal bowel sounds  Musculoskeletal: No cyanosis in digits, neck supple  Neurology: CN 2-12 grossly intact. No speech or motor deficits  Psych: Normal affect. Alert and oriented in time, place and person  Skin: Warm, dry, normal turgor  Extremity exam shows brisk capillary refill. Peripheral pulses are palpable in lower extremities       LABS:  Recent Labs     05/17/22  1232   WBC 6.0   HGB 10.7*   PLT 77*      Recent Labs     05/17/22  1232      K 3.2*      CO2 22   BUN 4*   CREATININE 0.6   GLUCOSE 136*     No results for input(s): INR in the last 72 hours.   Discharge Medications:     Medication List      START taking these medications    lactulose 10 GM/15ML solution  Commonly known as: CHRONULAC  Take 15 mLs by mouth daily     rifAXIMin 550 MG tablet  Commonly known as: XIFAXAN  Take 1 tablet by mouth 2 times daily        CHANGE how you take these medications    pantoprazole 40 MG tablet  Commonly known as: PROTONIX  Take 1 tablet by mouth 2 times daily (before meals)  What changed: when to take this        CONTINUE taking these medications    albuterol (2.5 MG/3ML) 0.083% nebulizer solution  Commonly known as: PROVENTIL     CENTRUM/CERTA-MARJAN with minerals oral solution     fluticasone 50 MCG/ACT nasal spray  Commonly known as: FLONASE     glipiZIDE 5 MG tablet  Commonly known as: GLUCOTROL     ipratropium-albuterol 0.5-2.5 (3) MG/3ML Soln nebulizer solution  Commonly known as: DUONEB     lidocaine 5 %  Commonly known as: LIDODERM     metFORMIN 500 MG tablet  Commonly known as: GLUCOPHAGE     metoprolol succinate 25 MG extended release tablet  Commonly known as: TOPROL XL  Take 1 tablet by mouth daily     naloxone 4 MG/0.1ML Liqd nasal spray     traZODone 100 MG tablet  Commonly known as: DESYREL     vitamin D 125 MCG (5000 UT) Caps capsule  Commonly known as: CHOLECALCIFEROL        STOP taking these medications acetaminophen 325 MG tablet  Commonly known as: TYLENOL     benzonatate 100 MG capsule  Commonly known as: TESSALON     metoprolol tartrate 25 MG tablet  Commonly known as: LOPRESSOR     spironolactone 50 MG tablet  Commonly known as: ALDACTONE     tiZANidine 4 MG tablet  Commonly known as: ZANAFLEX     torsemide 20 MG tablet  Commonly known as: DEMADEX        ASK your doctor about these medications    oxyCODONE HCl 10 MG immediate release tablet  Commonly known as: OXY-IR  Take 1 tablet by mouth every 4 hours as needed for Pain for up to 3 days. Ask about: Should I take this medication? Where to Get Your Medications      You can get these medications from any pharmacy    Bring a paper prescription for each of these medications  · oxyCODONE HCl 10 MG immediate release tablet     Information about where to get these medications is not yet available    Ask your nurse or doctor about these medications  · lactulose 10 GM/15ML solution  · metoprolol succinate 25 MG extended release tablet  · pantoprazole 40 MG tablet  · rifAXIMin 550 MG tablet        Activity: activity as tolerated  Diet: regular diet  Wound Care: as directed    Disposition: SNF  Discharged Condition: Stable  Follow Up: Primary Care Physician in one week    Total time spent on discharge, finalizing medications, referrals and arranging outpatient follow up was more than 30 minutes    Thank you Dr. Sima Don, APRN - CNP for the opportunity to be involved in this patients care. If you have any questions or concerns please feel free to contact me at [phone].

## 2022-05-20 NOTE — PROGRESS NOTES
Physician Progress Note      Eugenie Gomez  [facility] #:                  [id number]  :                       1965  ADMIT DATE:       2022 11:23 AM  Fidenc[address] Yousif DATE:        2022 6:47 PM  RESPONDING  PROVIDER #:        Angeline Soto MD          QUERY TEXT:    Patient admitted with AMS. Noted documentation of Sepsis in ID consult note   and Bacteremia in d/c summary. If possible, please document in progress notes   and discharge summary if you are evaluating and /or treating Sepsis and   provide further indicators or rule out the dx of Sepsis:      The medical record reflects the following:  Risk Factors: 60 y/o with UTI  Clinical Indicators: Per ED PN:  I have low suspicion for sepsis, as patient   is currently afebrile, and is bradycardic rather than tachycardic, without any   other signs or symptoms of sepsis that would warrant empiric antibiotic   administration. We will plan to administer 1 L of IV fluids, as patient does   appear to be slightly dehydrated on examination, which would be consistent   with a possible anticholinergic toxidrome along with her mydriasis.  PN Per ID:  Given the ongoing sepsis we are consulted for recommendation   patient is admitted to prevent any history secondary to change in mental   status and encephalopathy:  Sepsis Staph aureus bacteremia UTI. \"  D/C summary,   \"MSSA bacteremia with T9-T10 osteomyelitis/discitis with epidural abscess. \"     WBC 3.6, Lactic acid 3.7 on   and No other Sepsis indicators. Treatment: Blood cultures, LR 1,000 ml bolus, Zosyn  Options provided:  -- Sepsis ruled out  -- Sepsis confirmed as evidenced by, please provide further evidence to   support your diagnosis, please provide evidence to support your diagnosis. Pradeep Bose   -- Other - I will add my own diagnosis  -- Disagree - Not applicable / Not valid  -- Disagree - Clinically unable to determine / Unknown  -- Refer to Clinical Documentation Reviewer    PROVIDER RESPONSE TEXT:    After study, Sepsis was ruled out.     Query created by: Yvette Page on 5/20/2022 6:57 AM      Electronically signed by:  Jo Sainz MD 5/20/2022 8:24 AM

## 2022-05-23 ENCOUNTER — TELEPHONE (OUTPATIENT)
Dept: INFECTIOUS DISEASES | Age: 57
End: 2022-05-23

## 2022-05-23 NOTE — TELEPHONE ENCOUNTER
Received message from Shannon Peña NP at [facility] requesting more info on patients condition and why she is on antibiotics. Return call and all questions answered.

## 2022-05-27 NOTE — PROGRESS NOTES
Physician Progress Note      Gilbert vAiles  [facility] #:                  [id number]  :                       1965  ADMIT DATE:       2022 11:23 AM  [address] DATE:        2022 6:47 PM  RESPONDING  PROVIDER #:        Jo Sainz MD          QUERY TEXT:    Pt admitted with AMS. If possible, please document in progress notes and   discharge summary the relationship, if any, between the following. The medical record reflects the following:  Risk Factors: 60 y/o female with AMS  Clinical Indicators: Patient presented with AMS and possible ingestion. Per   H&P: \"Patient currently resides in a nursing home [facility]) and was   noted to be confused this morning upon awakening. Squad also states that   patient's nursing home reported a visitor around 3 AM, with concern for   administering Tylenol PM.  Patient admits to taking 2 these, but denies any   other ingestions today: Acute metabolic encephalopathy likely 2/2 hepatic   encephalopathy Drug induced encephalopathy is another possibility. \"  Ammonia   level was found to be 93 on admission and patient was ev  Treatment: Narcan PTA, Lactulose and Xifaxim. consult neurology, consulted GI  Options provided:  -- Poisoning with toxic encephalopathy due to taking Tylenol PM, not   prescribed  -- Acute Metabolic encephalopathy due to due to T9-T10 osteomyelitis/discitis   with epidural abscess  -- Acute metabolic encephalopathy due to Hyponatremia  -- Hepatic encephalopathy  -- Acute Metabolic/toxic encephalopathy due to due to T9-T10   osteomyelitis/discitis with epidural abscess, Hyponatremia, and poisoning due   to tylenol PM  -- Other - I will add my own diagnosis  -- Disagree - Not applicable / Not valid  -- Disagree - Clinically unable to determine / Unknown  -- Refer to Clinical Documentation Reviewer    PROVIDER RESPONSE TEXT:    This patient has Hepatic encephalopathy.     Query created by: Yvette Page on 2022 1:58 PM      Electronically signed by:  Jesse Peguero MD 5/27/2022 9:59 AM

## 2022-06-23 ENCOUNTER — TELEPHONE (OUTPATIENT)
Dept: INFECTIOUS DISEASES | Age: 57
End: 2022-06-23

## 2024-03-01 NOTE — PROGRESS NOTES
Hospitalist Progress Note      PCP: No primary care provider on file. Date of Admission: 3/28/2022    Chief Complaint:     Chief Complaint   Patient presents with    Abdominal Pain    Emesis    Diarrhea       Subjective:  Patient seen and examined at the bedside chair  No complaints at this time.   No acute events overnight  Has had 1-2 episodes of diarrhea since admission, looks like C. difficile has been sent  Abdominal pain is mild, she requested diet, will advance diet as tolerated  Proc elevated, infiltrate on chest x-ray, continue vancomycin and cefepime  Blood and urine cultures pending    PFHS: reviewed as documented 3/28/2022, no changes unless noted above    Medications:  Reviewed    Infusion Medications    sodium chloride      sodium chloride       Scheduled Medications    sodium chloride flush  10 mL IntraVENous 2 times per day    sennosides-docusate sodium  1 tablet Oral BID    enoxaparin  40 mg SubCUTAneous Daily    sodium chloride flush  5-40 mL IntraVENous 2 times per day    cefepime  2,000 mg IntraVENous Q12H    vancomycin  1,250 mg IntraVENous Q12H     PRN Meds: ipratropium-albuterol, sodium chloride flush, sodium chloride, promethazine **OR** ondansetron, polyethylene glycol, acetaminophen **OR** acetaminophen, sodium chloride flush, sodium chloride, morphine **OR** morphine      Intake/Output Summary (Last 24 hours) at 3/29/2022 0905  Last data filed at 3/29/2022 0530  Gross per 24 hour   Intake --   Output 300 ml   Net -300 ml       Physical Exam    BP (!) 109/50   Pulse 79   Temp 99.1 °F (37.3 °C) (Oral)   Resp 24   Ht 5' 6\" (1.676 m)   Wt 238 lb 1.6 oz (108 kg)   SpO2 92%   BMI 38.43 kg/m²     General appearance:  No acute distress, appears stated age  Eyes: Pupils equal, round, reactive to light, conjunctiva/corneas clear  Ears/Nose/Mouth/Throat: No external lesions or scars, hearing intact to voice  Neck: Trachea midline, no masses noted, no thyromegaly  Respiratory:  Non-labored breathing, clear to auscultation bilaterally  Cardiovascular: Regular rate and rhythm, no murmurs, gallops, or rubs  Abdomen: soft, non-tender, non-distended  Musculoskeletal: Warm, well perfused, no cyanosis or edema  Skin: normal color, no wounds noted  Psychiatric: A&Ox4, good insight and judgment    Labs:   Recent Labs     03/28/22  1003 03/29/22  0433   WBC 5.9 8.5   HGB 12.5 11.0*   HCT 37.5 32.0*   * 90*     Recent Labs     03/28/22  1004 03/29/22  0434   * 133*   K 4.4 3.8    102   CO2 21 20*   BUN 9 11   CREATININE <0.5* 0.7   CALCIUM 9.5 8.4     Recent Labs     03/28/22  1004   AST 35   ALT 16   BILITOT 2.3*   ALKPHOS 170*     No results for input(s): INR in the last 72 hours. Recent Labs     03/28/22  1004 03/28/22  2251   CKTOTAL  --  35   TROPONINI <0.01  --        Urinalysis:      Lab Results   Component Value Date    NITRU POSITIVE 03/28/2022    WBCUA 6-9 03/28/2022    BACTERIA 2+ 03/28/2022    RBCUA 5-10 03/28/2022    BLOODU TRACE 03/28/2022    SPECGRAV 1.015 03/28/2022    GLUCOSEU Negative 03/28/2022       Radiology:  XR CHEST PORTABLE   Final Result      Diffuse bilateral airspace disease, greater on the left. CT ABDOMEN PELVIS W IV CONTRAST Additional Contrast? None   Final Result   1. Cirrhotic appearing liver with stigmata of portal hypertension consisting of splenomegaly, small amount of ascites and left upper quadrant varices. 2. Distended gallbladder with cholelithiasis. This can be seen with prolonged fasting state or chronic cystic duct obstruction. 3. There is some mild wall thickening of the transverse limb of the duodenum with some adjacent retroperitoneal fat stranding. This may reflect a duodenitis. Alternatively, this appearance may be caused by the duodenum being decompressed. This could be    further evaluated with endoscopy.    4. There is some retroperitoneal fat stranding identified within the right upper quadrant and right mid abdomen. This may be related to cirrhosis and portal hypertension. Reactive edema could result in this appearance in the setting of a duodenitis. Assessment/Plan:    Active Hospital Problems    Diagnosis Date Noted    Intractable abdominal pain [R10.9] 03/28/2022       Plan:    #Sepsis, suspect respiratory source  Pro-Jignesh elevated  Lactate within normal limits  Blood and urine cultures sent  Check MRSA probe, pneumococcal and Legionella antigen  BP trended down, will bolus lactated Ringer's    #Abdominal pain  CT A/P showing chronic changes  Pain improving, analgesia as needed    #Hypomagnesemia  Replete and recheck    #Chronic respiratory failure  Trach in place    #Remainder chronic conditions  Home management of sepsis above    DVT Prophylaxis: Lovenox  Diet: ADULT DIET;  Regular  Code Status: Full Code    PT/OT Eval Status: Ongoing    Dispo: Bi Li pending clinical improvement    Kezia Willingham MD Oriented - self; Oriented - place; Oriented - time

## (undated) DEVICE — FORCEPS BX L240CM JAW DIA2.8MM L CAP W/ NDL MIC MESH TOOTH

## (undated) DEVICE — CANNULA SAMP CO2 AD GRN 7FT CO2 AND 7FT O2 TBNG UNIV CONN